# Patient Record
Sex: FEMALE | Race: BLACK OR AFRICAN AMERICAN | Employment: UNEMPLOYED | URBAN - METROPOLITAN AREA
[De-identification: names, ages, dates, MRNs, and addresses within clinical notes are randomized per-mention and may not be internally consistent; named-entity substitution may affect disease eponyms.]

---

## 2017-02-16 ENCOUNTER — APPOINTMENT (OUTPATIENT)
Dept: GENERAL RADIOLOGY | Age: 42
End: 2017-02-16
Payer: COMMERCIAL

## 2017-02-16 ENCOUNTER — HOSPITAL ENCOUNTER (EMERGENCY)
Age: 42
Discharge: HOME | End: 2017-02-16
Attending: EMERGENCY MEDICINE
Payer: COMMERCIAL

## 2017-02-16 VITALS
HEIGHT: 60 IN | DIASTOLIC BLOOD PRESSURE: 78 MMHG | RESPIRATION RATE: 17 BRPM | WEIGHT: 270 LBS | BODY MASS INDEX: 53.01 KG/M2 | SYSTOLIC BLOOD PRESSURE: 151 MMHG | OXYGEN SATURATION: 97 % | HEART RATE: 86 BPM | TEMPERATURE: 98.2 F

## 2017-02-16 DIAGNOSIS — Z72.0 TOBACCO ABUSE: ICD-10-CM

## 2017-02-16 DIAGNOSIS — J40 BRONCHITIS: Primary | ICD-10-CM

## 2017-02-16 DIAGNOSIS — J06.9 VIRAL UPPER RESPIRATORY ILLNESS: ICD-10-CM

## 2017-02-16 PROCEDURE — 99284 EMERGENCY DEPT VISIT MOD MDM: CPT

## 2017-02-16 PROCEDURE — 71020 XR CHEST STANDARD TWO VW: CPT | Performed by: RADIOLOGY

## 2017-02-16 PROCEDURE — 71020 XR CHEST STANDARD TWO VW: CPT

## 2017-02-16 PROCEDURE — 6370000000 HC RX 637 (ALT 250 FOR IP): Performed by: EMERGENCY MEDICINE

## 2017-02-16 PROCEDURE — 94640 AIRWAY INHALATION TREATMENT: CPT

## 2017-02-16 RX ORDER — IPRATROPIUM BROMIDE AND ALBUTEROL SULFATE 2.5; .5 MG/3ML; MG/3ML
1 SOLUTION RESPIRATORY (INHALATION) ONCE
Status: COMPLETED | OUTPATIENT
Start: 2017-02-16 | End: 2017-02-16

## 2017-02-16 RX ORDER — PREDNISONE 10 MG/1
TABLET ORAL
Qty: 20 TABLET | Refills: 0 | Status: SHIPPED | OUTPATIENT
Start: 2017-02-16 | End: 2017-02-26

## 2017-02-16 RX ORDER — CODEINE PHOSPHATE AND GUAIFENESIN 10; 100 MG/5ML; MG/5ML
10 SOLUTION ORAL ONCE
Status: COMPLETED | OUTPATIENT
Start: 2017-02-16 | End: 2017-02-16

## 2017-02-16 RX ORDER — ALBUTEROL SULFATE 90 UG/1
1-2 AEROSOL, METERED RESPIRATORY (INHALATION) EVERY 4 HOURS PRN
Qty: 1 INHALER | Refills: 0 | Status: SHIPPED | OUTPATIENT
Start: 2017-02-16 | End: 2019-08-09

## 2017-02-16 RX ORDER — GUAIFENESIN AND CODEINE PHOSPHATE 100; 10 MG/5ML; MG/5ML
5 SOLUTION ORAL 3 TIMES DAILY PRN
Qty: 180 ML | Refills: 0 | Status: SHIPPED | OUTPATIENT
Start: 2017-02-16 | End: 2017-02-23

## 2017-02-16 RX ADMIN — GUAIFENESIN AND CODEINE PHOSPHATE 10 ML: 100; 10 SOLUTION ORAL at 22:22

## 2017-02-16 RX ADMIN — IPRATROPIUM BROMIDE AND ALBUTEROL SULFATE 1 AMPULE: .5; 3 SOLUTION RESPIRATORY (INHALATION) at 21:00

## 2017-02-16 ASSESSMENT — ENCOUNTER SYMPTOMS
COUGH: 1
SHORTNESS OF BREATH: 1
ABDOMINAL PAIN: 0

## 2017-02-16 ASSESSMENT — PAIN DESCRIPTION - LOCATION: LOCATION: CHEST

## 2017-02-16 ASSESSMENT — PAIN SCALES - GENERAL: PAINLEVEL_OUTOF10: 4

## 2017-02-16 ASSESSMENT — PAIN - FUNCTIONAL ASSESSMENT: PAIN_FUNCTIONAL_ASSESSMENT: 0-10

## 2017-04-07 ENCOUNTER — HOSPITAL ENCOUNTER (OUTPATIENT)
Age: 42
Setting detail: SPECIMEN
Discharge: HOME | End: 2017-04-07
Payer: COMMERCIAL

## 2017-04-07 ENCOUNTER — OFFICE VISIT (OUTPATIENT)
Dept: OBGYN | Age: 42
End: 2017-04-07
Payer: COMMERCIAL

## 2017-04-07 VITALS
SYSTOLIC BLOOD PRESSURE: 117 MMHG | BODY MASS INDEX: 54.21 KG/M2 | HEART RATE: 73 BPM | WEIGHT: 276.1 LBS | DIASTOLIC BLOOD PRESSURE: 77 MMHG | HEIGHT: 60 IN

## 2017-04-07 DIAGNOSIS — Z01.419 WOMEN'S ANNUAL ROUTINE GYNECOLOGICAL EXAMINATION: Primary | ICD-10-CM

## 2017-04-07 LAB
DIRECT EXAM: ABNORMAL
Lab: ABNORMAL
SPECIMEN DESCRIPTION: ABNORMAL
STATUS: ABNORMAL

## 2017-04-07 PROCEDURE — 99385 PREV VISIT NEW AGE 18-39: CPT | Performed by: OBSTETRICS & GYNECOLOGY

## 2017-04-07 ASSESSMENT — PATIENT HEALTH QUESTIONNAIRE - PHQ9
1. LITTLE INTEREST OR PLEASURE IN DOING THINGS: 0
SUM OF ALL RESPONSES TO PHQ QUESTIONS 1-9: 1
SUM OF ALL RESPONSES TO PHQ9 QUESTIONS 1 & 2: 1
2. FEELING DOWN, DEPRESSED OR HOPELESS: 1
SUM OF ALL RESPONSES TO PHQ QUESTIONS 1-9: 1
SUM OF ALL RESPONSES TO PHQ9 QUESTIONS 1 & 2: 1
1. LITTLE INTEREST OR PLEASURE IN DOING THINGS: 0
2. FEELING DOWN, DEPRESSED OR HOPELESS: 1

## 2017-04-10 LAB
C TRACH DNA GENITAL QL NAA+PROBE: NEGATIVE
HPV SAMPLE: NORMAL
HPV SOURCE: NORMAL
HPV, GENOTYPE 16: NOT DETECTED
HPV, GENOTYPE 18: NOT DETECTED
HPV, HIGH RISK OTHER: NOT DETECTED
HPV, INTERPRETATION: NORMAL
N. GONORRHOEAE DNA: NEGATIVE

## 2017-04-11 LAB — CYTOLOGY REPORT: NORMAL

## 2017-06-02 ENCOUNTER — TELEPHONE (OUTPATIENT)
Dept: OBGYN | Age: 42
End: 2017-06-02

## 2017-06-02 RX ORDER — METRONIDAZOLE 500 MG/1
2000 TABLET ORAL ONCE
Qty: 4 TABLET | Refills: 0 | Status: SHIPPED | OUTPATIENT
Start: 2017-06-02 | End: 2017-06-19 | Stop reason: SDUPTHER

## 2017-06-03 ENCOUNTER — HOSPITAL ENCOUNTER (EMERGENCY)
Age: 42
Discharge: HOME | End: 2017-06-03
Attending: EMERGENCY MEDICINE
Payer: COMMERCIAL

## 2017-06-03 VITALS
DIASTOLIC BLOOD PRESSURE: 82 MMHG | RESPIRATION RATE: 20 BRPM | BODY MASS INDEX: 53.01 KG/M2 | OXYGEN SATURATION: 100 % | WEIGHT: 270 LBS | HEART RATE: 94 BPM | SYSTOLIC BLOOD PRESSURE: 137 MMHG | TEMPERATURE: 98.8 F | HEIGHT: 60 IN

## 2017-06-03 DIAGNOSIS — K61.0 PERIANAL ABSCESS: Primary | ICD-10-CM

## 2017-06-03 PROCEDURE — 99283 EMERGENCY DEPT VISIT LOW MDM: CPT

## 2017-06-03 PROCEDURE — 6370000000 HC RX 637 (ALT 250 FOR IP): Performed by: EMERGENCY MEDICINE

## 2017-06-03 RX ORDER — AMOXICILLIN AND CLAVULANATE POTASSIUM 875; 125 MG/1; MG/1
1 TABLET, FILM COATED ORAL ONCE
Status: COMPLETED | OUTPATIENT
Start: 2017-06-03 | End: 2017-06-03

## 2017-06-03 RX ORDER — HYDROCODONE BITARTRATE AND ACETAMINOPHEN 5; 325 MG/1; MG/1
1 TABLET ORAL EVERY 6 HOURS PRN
Qty: 20 TABLET | Refills: 0 | Status: SHIPPED | OUTPATIENT
Start: 2017-06-03 | End: 2017-06-10

## 2017-06-03 RX ORDER — METRONIDAZOLE 500 MG/1
500 TABLET ORAL 2 TIMES DAILY
Qty: 13 TABLET | Refills: 0 | Status: SHIPPED | OUTPATIENT
Start: 2017-06-03 | End: 2019-08-09

## 2017-06-03 RX ORDER — NAPROXEN 250 MG/1
500 TABLET ORAL ONCE
Status: COMPLETED | OUTPATIENT
Start: 2017-06-03 | End: 2017-06-03

## 2017-06-03 RX ORDER — METRONIDAZOLE 500 MG/1
500 TABLET ORAL ONCE
Status: COMPLETED | OUTPATIENT
Start: 2017-06-03 | End: 2017-06-03

## 2017-06-03 RX ORDER — AMOXICILLIN AND CLAVULANATE POTASSIUM 875; 125 MG/1; MG/1
1 TABLET, FILM COATED ORAL 2 TIMES DAILY
Qty: 13 TABLET | Refills: 0 | Status: SHIPPED | OUTPATIENT
Start: 2017-06-03 | End: 2019-08-09

## 2017-06-03 RX ADMIN — AMOXICILLIN AND CLAVULANATE POTASSIUM 1 TABLET: 875; 125 TABLET, FILM COATED ORAL at 03:14

## 2017-06-03 RX ADMIN — NAPROXEN 500 MG: 250 TABLET ORAL at 03:14

## 2017-06-03 RX ADMIN — METRONIDAZOLE 500 MG: 500 TABLET ORAL at 03:14

## 2017-06-03 ASSESSMENT — PAIN DESCRIPTION - LOCATION: LOCATION: RECTUM

## 2017-06-03 ASSESSMENT — ENCOUNTER SYMPTOMS
COLOR CHANGE: 1
VOMITING: 0
DIARRHEA: 0

## 2017-06-03 ASSESSMENT — PAIN SCALES - GENERAL
PAINLEVEL_OUTOF10: 9
PAINLEVEL_OUTOF10: 9

## 2017-06-03 ASSESSMENT — PAIN DESCRIPTION - PAIN TYPE: TYPE: ACUTE PAIN

## 2017-06-03 ASSESSMENT — PAIN DESCRIPTION - DESCRIPTORS: DESCRIPTORS: BURNING;TIGHTNESS;TENDER

## 2017-06-03 ASSESSMENT — PAIN DESCRIPTION - FREQUENCY: FREQUENCY: CONTINUOUS

## 2017-06-07 ENCOUNTER — HOSPITAL ENCOUNTER (OUTPATIENT)
Age: 42
Discharge: HOME | End: 2017-06-07
Payer: COMMERCIAL

## 2017-06-07 LAB
ABSOLUTE RETIC #: 0.1 M/UL (ref 0.02–0.1)
ANION GAP SERPL CALCULATED.3IONS-SCNC: 15 MMOL/L (ref 9–17)
BUN BLDV-MCNC: 9 MG/DL (ref 6–20)
BUN/CREAT BLD: ABNORMAL (ref 9–20)
CALCIUM SERPL-MCNC: 8.9 MG/DL (ref 8.6–10.4)
CHLORIDE BLD-SCNC: 104 MMOL/L (ref 98–107)
CHOLESTEROL/HDL RATIO: 2.9
CHOLESTEROL: 141 MG/DL
CO2: 23 MMOL/L (ref 20–31)
CREAT SERPL-MCNC: 0.48 MG/DL (ref 0.5–0.9)
CREATININE URINE: 137.2 MG/DL (ref 28–217)
ESTIMATED AVERAGE GLUCOSE: 131 MG/DL
GFR AFRICAN AMERICAN: >60 ML/MIN
GFR NON-AFRICAN AMERICAN: >60 ML/MIN
GFR SERPL CREATININE-BSD FRML MDRD: ABNORMAL ML/MIN/{1.73_M2}
GFR SERPL CREATININE-BSD FRML MDRD: ABNORMAL ML/MIN/{1.73_M2}
GLUCOSE BLD-MCNC: 100 MG/DL (ref 70–99)
HBA1C MFR BLD: 6.2 % (ref 4–6)
HCT VFR BLD CALC: 29.8 % (ref 36–46)
HDLC SERPL-MCNC: 49 MG/DL
HEMOGLOBIN: 9.3 G/DL (ref 12–16)
LDL CHOLESTEROL: 78 MG/DL (ref 0–130)
MCH RBC QN AUTO: 22.3 PG (ref 26–34)
MCHC RBC AUTO-ENTMCNC: 31.3 G/DL (ref 31–37)
MCV RBC AUTO: 71.3 FL (ref 80–100)
MICROALBUMIN/CREAT 24H UR: <12 MG/L
MICROALBUMIN/CREAT UR-RTO: 9 MCG/MG CREAT
PDW BLD-RTO: 20.5 % (ref 12.5–15.4)
PLATELET # BLD: 356 K/UL (ref 140–450)
PMV BLD AUTO: 9.3 FL (ref 6–12)
POTASSIUM SERPL-SCNC: 4.3 MMOL/L (ref 3.7–5.3)
RBC # BLD: 4.18 M/UL (ref 4–5.2)
RETIC %: 2.4 % (ref 0.5–2)
SODIUM BLD-SCNC: 142 MMOL/L (ref 135–144)
TRIGL SERPL-MCNC: 71 MG/DL
TSH SERPL DL<=0.05 MIU/L-ACNC: 1.98 MIU/L (ref 0.3–5)
VITAMIN D 25-HYDROXY: 10.2 NG/ML (ref 30–100)
VLDLC SERPL CALC-MCNC: NORMAL MG/DL (ref 1–30)
WBC # BLD: 7.6 K/UL (ref 3.5–11)

## 2017-06-07 PROCEDURE — 80061 LIPID PANEL: CPT

## 2017-06-07 PROCEDURE — 84443 ASSAY THYROID STIM HORMONE: CPT

## 2017-06-07 PROCEDURE — 85045 AUTOMATED RETICULOCYTE COUNT: CPT

## 2017-06-07 PROCEDURE — 85027 COMPLETE CBC AUTOMATED: CPT

## 2017-06-07 PROCEDURE — 82043 UR ALBUMIN QUANTITATIVE: CPT

## 2017-06-07 PROCEDURE — 80048 BASIC METABOLIC PNL TOTAL CA: CPT

## 2017-06-07 PROCEDURE — 86665 EPSTEIN-BARR CAPSID VCA: CPT

## 2017-06-07 PROCEDURE — 86664 EPSTEIN-BARR NUCLEAR ANTIGEN: CPT

## 2017-06-07 PROCEDURE — 86663 EPSTEIN-BARR ANTIBODY: CPT

## 2017-06-07 PROCEDURE — 83036 HEMOGLOBIN GLYCOSYLATED A1C: CPT

## 2017-06-07 PROCEDURE — 82570 ASSAY OF URINE CREATININE: CPT

## 2017-06-07 PROCEDURE — 82306 VITAMIN D 25 HYDROXY: CPT

## 2017-06-07 PROCEDURE — 36415 COLL VENOUS BLD VENIPUNCTURE: CPT

## 2017-06-08 LAB
EBV EARLY ANTIGEN AB, IGG: <5 U/ML (ref 0–10.9)
EBV NUCLEAR AG AB: >600 U/ML (ref 0–21.9)
EPSTEIN-BARR VCA IGG: 125 U/ML (ref 0–21.9)
EPSTEIN-BARR VCA IGM: <10 U/ML (ref 0–43.9)

## 2017-06-19 RX ORDER — METRONIDAZOLE 500 MG/1
2000 TABLET ORAL ONCE
Qty: 4 TABLET | Refills: 0 | Status: SHIPPED | OUTPATIENT
Start: 2017-06-19 | End: 2017-06-19

## 2017-09-06 ENCOUNTER — OFFICE VISIT (OUTPATIENT)
Dept: OBGYN | Age: 42
End: 2017-09-06
Payer: COMMERCIAL

## 2017-09-06 ENCOUNTER — HOSPITAL ENCOUNTER (OUTPATIENT)
Age: 42
Setting detail: SPECIMEN
Discharge: HOME | End: 2017-09-06
Payer: COMMERCIAL

## 2017-09-06 VITALS
BODY MASS INDEX: 53.99 KG/M2 | HEIGHT: 60 IN | DIASTOLIC BLOOD PRESSURE: 85 MMHG | WEIGHT: 275 LBS | SYSTOLIC BLOOD PRESSURE: 129 MMHG | HEART RATE: 90 BPM

## 2017-09-06 DIAGNOSIS — N93.9 ABNORMAL UTERINE BLEEDING (AUB): Primary | ICD-10-CM

## 2017-09-06 DIAGNOSIS — A59.9 TRICHOMONAS INFECTION: ICD-10-CM

## 2017-09-06 LAB
DIRECT EXAM: ABNORMAL
Lab: ABNORMAL
SPECIMEN DESCRIPTION: ABNORMAL
STATUS: ABNORMAL

## 2017-09-06 PROCEDURE — 99213 OFFICE O/P EST LOW 20 MIN: CPT | Performed by: OBSTETRICS & GYNECOLOGY

## 2017-10-07 ENCOUNTER — HOSPITAL ENCOUNTER (EMERGENCY)
Age: 42
Discharge: HOME | End: 2017-10-07
Attending: EMERGENCY MEDICINE
Payer: COMMERCIAL

## 2017-10-07 VITALS
BODY MASS INDEX: 51.44 KG/M2 | DIASTOLIC BLOOD PRESSURE: 86 MMHG | WEIGHT: 262 LBS | HEIGHT: 60 IN | RESPIRATION RATE: 16 BRPM | HEART RATE: 93 BPM | SYSTOLIC BLOOD PRESSURE: 148 MMHG | OXYGEN SATURATION: 100 % | TEMPERATURE: 98.4 F

## 2017-10-07 DIAGNOSIS — H92.03 EAR PAIN, BILATERAL: ICD-10-CM

## 2017-10-07 DIAGNOSIS — S46.811A TRAPEZIUS STRAIN, RIGHT, INITIAL ENCOUNTER: ICD-10-CM

## 2017-10-07 DIAGNOSIS — J02.9 ACUTE PHARYNGITIS, UNSPECIFIED ETIOLOGY: Primary | ICD-10-CM

## 2017-10-07 PROCEDURE — 99283 EMERGENCY DEPT VISIT LOW MDM: CPT

## 2017-10-07 RX ORDER — AZITHROMYCIN 250 MG/1
TABLET, FILM COATED ORAL
Qty: 1 PACKET | Refills: 0 | Status: SHIPPED | OUTPATIENT
Start: 2017-10-07 | End: 2017-10-17

## 2017-10-07 RX ORDER — TRAMADOL HYDROCHLORIDE 50 MG/1
50 TABLET ORAL EVERY 6 HOURS PRN
Qty: 10 TABLET | Refills: 0 | Status: SHIPPED | OUTPATIENT
Start: 2017-10-07 | End: 2018-07-11

## 2017-10-07 ASSESSMENT — PAIN DESCRIPTION - LOCATION: LOCATION: EAR;THROAT

## 2017-10-07 ASSESSMENT — PAIN DESCRIPTION - DESCRIPTORS: DESCRIPTORS: ACHING

## 2017-10-07 ASSESSMENT — PAIN DESCRIPTION - PAIN TYPE: TYPE: ACUTE PAIN

## 2017-10-07 ASSESSMENT — PAIN SCALES - GENERAL: PAINLEVEL_OUTOF10: 10

## 2017-10-07 ASSESSMENT — PAIN DESCRIPTION - ONSET: ONSET: ON-GOING

## 2017-10-07 ASSESSMENT — PAIN DESCRIPTION - FREQUENCY: FREQUENCY: CONTINUOUS

## 2017-10-07 NOTE — ED PROVIDER NOTES
[address]  eMERGENCY dEPARTMENT eNCOUnter      Pt Name: Andrew Ovalle  MRN: [id number]  [place] 1975  Date of evaluation: 10/7/2017  Provider: Jayla Cruz PA-C    CHIEF COMPLAINT     No chief complaint on file. HISTORY OF PRESENT ILLNESS  (Location/Symptom, Timing/Onset, Context/Setting, Quality, Duration, Modifying Factors, Severity.)   Andrew Ovalle is a 46 y.o. female who presents to the emergency department With complaints of bilateral ear pain, sore throat, right shoulder pain. Patient states her ear pain and sore throat began yesterday. Patient states it is painful to eat and drink. States she does have postnasal drip. Patient denies fever, chills, and congestion, rhinorrhea cough, chest pain, shortness breath, nausea, vomiting, abdominal pain. Patient has not taken anything for her symptoms. She is also complaining of right shoulder pain that started 3 weeks ago. Patient did see her family doctor who prescribed her a muscle relaxer which is not alleviating any pain. Patient denies any injury but states she sleeps on her right arm. Admits to numbness and tingling that goes down the arm. Patient denies any neck pain, weakness. No other complaints. Nursing Notes were reviewed. REVIEW OF SYSTEMS    (2-9 systems for level 4, 10 or more for level 5)     Review of Systems   Ear pain, shoulder pain, sore throat, post nasal drip     Except as noted above the remainder of the review of systems was reviewed and negative.        PAST MEDICAL HISTORY     Past Medical History:   Diagnosis Date    Diabetes mellitus ([address]     GERD (gastroesophageal reflux disease)     Hidradenitis suppurativa     Hyperlipidemia     Hypertension     Obesity, morbid, BMI 50 or higher ([address]     Sleep apnea     CPAP use at 16     None otherwise stated in nurses notes    SURGICAL HISTORY       Past Surgical History:   Procedure Laterality Date    ABSCESS DRAINAGE      ABSCESS years ago. She has been smoking about 0.50 packs per day. She does not have any smokeless tobacco history on file. She reports that she drinks alcohol. She reports that she uses drugs, including Marijuana. lives at home with others     PHYSICAL EXAM    (up to 7 for level 4, 8 or more for level 5)     ED Triage Vitals [10/07/17 1853]   BP Temp Temp src Pulse Resp SpO2 Height Weight   (!) 148/86 98.4 °F (36.9 °C) -- 93 16 100 % 5' (1.524 m) 262 lb (118.8 kg)       Physical Exam   Nursing note and vitals reviewed. Constitutional: Oriented to person, place, and time and well-developed, well-nourished. Head: Normocephalic and atraumatic. Ear: External ears normal. Tympanic membranes nonerythematous bilaterally with no bulging. No canal erythema. No mastoid tenderness. Nose: Nose normal and midline. Eyes: Conjunctivae and EOM are normal. Pupils are equal, round, and reactive to light. Neck: Normal range of motion. Neck supple. Throat: Posterior pharynx is erythematous with swelling. No exudates. Airway is patent. No tonsillar abscess. Cardiovascular: Normal rate, regular rhythm, normal heart sounds and intact distal pulses. Pulmonary/Chest: Effort normal and breath sounds normal. No respiratory distress. No wheezes. No rales. No chest tenderness. Abdominal: Soft. Bowel sounds are normal. No distension and no mass. There is no tenderness. There is no rebound and no guarding. Musculoskeletal: Examination of right shoulder reveals no visible deformities, bruising, abrasions, swelling, erythema. No bony tenderness. Tenderness over the right trapezius muscle. No midline cervical tenderness. Full range of motion. 5/5 strength. 2/2 radial pulse. Good  strength. Brisk capillary refill. Distal sensation intact. Neurological: Alert and oriented to person, place, and time. GCS score is 15. Skin: Skin is warm and dry. No rash noted. No erythema. No pallor.    Psychiatric: Mood, memory, followed by 250mg on days 2-5. TRAMADOL (ULTRAM) 50 MG TABLET    Take 1 tablet by mouth every 6 hours as needed for Pain         Summation      Patient Course:  Bilateral ear pain, sore throat, right shoulder pain. Examination reveals an erythematous swollen throat that is patent. Right trapezius muscle pain. We'll give tramadol for patient's pain. Instructed her to take NSAIDs as well. Z-Shakir for patient's sore throat. Patient to follow-up with primary care physician. Return if symptoms change or worsen. Patient understands and agrees with plan. ED Medications administered this visit:  Medications - No data to display    New Prescriptions from this visit:    New Prescriptions    AZITHROMYCIN (ZITHROMAX) 250 MG TABLET    Take 500mg on day 1, followed by 250mg on days 2-5. TRAMADOL (ULTRAM) 50 MG TABLET    Take 1 tablet by mouth every 6 hours as needed for Pain       Follow-up:  Delia Eric, [address]  [phone]    Schedule an appointment as soon as possible for a visit in 1 day      [facility] ED  [place] [id number] [phone]    If symptoms worsen        Final Impression:   1. Acute pharyngitis, unspecified etiology    2. Ear pain, bilateral    3.  Trapezius strain, right, initial encounter               (Please note that portions of this note were completed with a voice recognition program.  Efforts were made to edit the dictations but occasionally words are mis-transcribed.)      (Please note that portions of this note were completed with a voice recognition program.  Efforts were made to edit the dictations but occasionally words are mis-transcribed.)    Andrew Cruz. Marco Ruelas, PA-C  10/07/17 1934

## 2017-10-07 NOTE — ED PROVIDER NOTES
[address]  eMERGENCY dEPARTMENT eNCOUnter   Independent Attestation     Pt Name: Davey Arevalo  MRN: [id number]  [place] 1975  Date of evaluation: 10/7/17   Davey Arevalo is a 46 y.o. female who presents with No chief complaint on file. Vitals:   Vitals:    10/07/17 1853   BP: (!) 148/86   Pulse: 93   Resp: 16   Temp: 98.4 °F (36.9 °C)   SpO2: 100%   Weight: 262 lb (118.8 kg)   Height: 5' (1.524 m)     Impression:   1. Acute pharyngitis, unspecified etiology    2. Ear pain, bilateral    3. Trapezius strain, right, initial encounter      I was personally available for consultation in the Emergency Department. I have reviewed the chart and agree with the documentation as recorded by the [facility], including the assessment, treatment plan and disposition.   Titi Robertson MD  Attending Emergency  Physician                  Titi Robertson MD  10/07/17 1937       Titi Robertson MD  10/07/17 9078

## 2017-11-27 ENCOUNTER — APPOINTMENT (OUTPATIENT)
Dept: CT IMAGING | Age: 42
End: 2017-11-27
Payer: COMMERCIAL

## 2017-11-27 ENCOUNTER — HOSPITAL ENCOUNTER (EMERGENCY)
Age: 42
Discharge: HOME | End: 2017-11-28
Attending: EMERGENCY MEDICINE
Payer: COMMERCIAL

## 2017-11-27 DIAGNOSIS — R10.13 ABDOMINAL PAIN, EPIGASTRIC: ICD-10-CM

## 2017-11-27 DIAGNOSIS — R11.2 NAUSEA VOMITING AND DIARRHEA: Primary | ICD-10-CM

## 2017-11-27 DIAGNOSIS — R19.7 NAUSEA VOMITING AND DIARRHEA: Primary | ICD-10-CM

## 2017-11-27 LAB
ALBUMIN SERPL-MCNC: 4.6 G/DL (ref 3.5–5.2)
ALBUMIN/GLOBULIN RATIO: NORMAL (ref 1–2.5)
ALP BLD-CCNC: 59 U/L (ref 35–104)
ALT SERPL-CCNC: 8 U/L (ref 5–33)
ANION GAP SERPL CALCULATED.3IONS-SCNC: 15 MMOL/L (ref 9–17)
AST SERPL-CCNC: 11 U/L
BILIRUB SERPL-MCNC: 0.36 MG/DL (ref 0.3–1.2)
BILIRUBIN DIRECT: 0.1 MG/DL
BILIRUBIN, INDIRECT: 0.26 MG/DL (ref 0–1)
BUN BLDV-MCNC: 14 MG/DL (ref 6–20)
BUN/CREAT BLD: ABNORMAL (ref 9–20)
CALCIUM SERPL-MCNC: 9 MG/DL (ref 8.6–10.4)
CHLORIDE BLD-SCNC: 99 MMOL/L (ref 98–107)
CO2: 23 MMOL/L (ref 20–31)
CREAT SERPL-MCNC: 0.45 MG/DL (ref 0.5–0.9)
GFR AFRICAN AMERICAN: >60 ML/MIN
GFR NON-AFRICAN AMERICAN: >60 ML/MIN
GFR SERPL CREATININE-BSD FRML MDRD: ABNORMAL ML/MIN/{1.73_M2}
GFR SERPL CREATININE-BSD FRML MDRD: ABNORMAL ML/MIN/{1.73_M2}
GLOBULIN: NORMAL G/DL (ref 1.5–3.8)
GLUCOSE BLD-MCNC: 121 MG/DL (ref 70–99)
HCG QUALITATIVE: NEGATIVE
LIPASE: 23 U/L (ref 13–60)
POTASSIUM SERPL-SCNC: 4 MMOL/L (ref 3.7–5.3)
SODIUM BLD-SCNC: 137 MMOL/L (ref 135–144)
TOTAL PROTEIN: 8.3 G/DL (ref 6.4–8.3)

## 2017-11-27 PROCEDURE — 2500000003 HC RX 250 WO HCPCS: Performed by: EMERGENCY MEDICINE

## 2017-11-27 PROCEDURE — 80048 BASIC METABOLIC PNL TOTAL CA: CPT

## 2017-11-27 PROCEDURE — 96375 TX/PRO/DX INJ NEW DRUG ADDON: CPT

## 2017-11-27 PROCEDURE — 80076 HEPATIC FUNCTION PANEL: CPT

## 2017-11-27 PROCEDURE — 99284 EMERGENCY DEPT VISIT MOD MDM: CPT

## 2017-11-27 PROCEDURE — 6360000002 HC RX W HCPCS: Performed by: EMERGENCY MEDICINE

## 2017-11-27 PROCEDURE — 83690 ASSAY OF LIPASE: CPT

## 2017-11-27 PROCEDURE — 84703 CHORIONIC GONADOTROPIN ASSAY: CPT

## 2017-11-27 PROCEDURE — 36415 COLL VENOUS BLD VENIPUNCTURE: CPT

## 2017-11-27 PROCEDURE — 2580000003 HC RX 258: Performed by: EMERGENCY MEDICINE

## 2017-11-27 PROCEDURE — 85025 COMPLETE CBC W/AUTO DIFF WBC: CPT

## 2017-11-27 PROCEDURE — S0028 INJECTION, FAMOTIDINE, 20 MG: HCPCS | Performed by: EMERGENCY MEDICINE

## 2017-11-27 PROCEDURE — 96374 THER/PROPH/DIAG INJ IV PUSH: CPT

## 2017-11-27 RX ORDER — ONDANSETRON 2 MG/ML
4 INJECTION INTRAMUSCULAR; INTRAVENOUS ONCE
Status: COMPLETED | OUTPATIENT
Start: 2017-11-27 | End: 2017-11-27

## 2017-11-27 RX ORDER — SODIUM CHLORIDE 0.9 % (FLUSH) 0.9 %
10 SYRINGE (ML) INJECTION PRN
Status: DISCONTINUED | OUTPATIENT
Start: 2017-11-27 | End: 2017-11-28 | Stop reason: HOSPADM

## 2017-11-27 RX ORDER — 0.9 % SODIUM CHLORIDE 0.9 %
100 INTRAVENOUS SOLUTION INTRAVENOUS ONCE
Status: COMPLETED | OUTPATIENT
Start: 2017-11-28 | End: 2017-11-28

## 2017-11-27 RX ORDER — FENTANYL CITRATE 50 UG/ML
50 INJECTION, SOLUTION INTRAMUSCULAR; INTRAVENOUS ONCE
Status: COMPLETED | OUTPATIENT
Start: 2017-11-27 | End: 2017-11-27

## 2017-11-27 RX ORDER — 0.9 % SODIUM CHLORIDE 0.9 %
1000 INTRAVENOUS SOLUTION INTRAVENOUS ONCE
Status: COMPLETED | OUTPATIENT
Start: 2017-11-27 | End: 2017-11-28

## 2017-11-27 RX ADMIN — ONDANSETRON 4 MG: 2 INJECTION INTRAMUSCULAR; INTRAVENOUS at 23:16

## 2017-11-27 RX ADMIN — FENTANYL CITRATE 50 MCG: 50 INJECTION INTRAMUSCULAR; INTRAVENOUS at 23:53

## 2017-11-27 RX ADMIN — SODIUM CHLORIDE 1000 ML: 9 INJECTION, SOLUTION INTRAVENOUS at 23:15

## 2017-11-27 RX ADMIN — FAMOTIDINE 20 MG: 10 INJECTION, SOLUTION INTRAVENOUS at 23:16

## 2017-11-27 ASSESSMENT — ENCOUNTER SYMPTOMS
VOMITING: 1
SHORTNESS OF BREATH: 0
NAUSEA: 1
COUGH: 0
DIARRHEA: 1
SORE THROAT: 0
ABDOMINAL PAIN: 1

## 2017-11-27 ASSESSMENT — PAIN DESCRIPTION - ORIENTATION: ORIENTATION: MID;LEFT;RIGHT

## 2017-11-27 ASSESSMENT — PAIN DESCRIPTION - ONSET: ONSET: ON-GOING

## 2017-11-27 ASSESSMENT — PAIN DESCRIPTION - FREQUENCY: FREQUENCY: CONTINUOUS

## 2017-11-27 ASSESSMENT — PAIN DESCRIPTION - DESCRIPTORS: DESCRIPTORS: CONSTANT;SHARP;STABBING

## 2017-11-27 ASSESSMENT — PAIN DESCRIPTION - LOCATION: LOCATION: ABDOMEN

## 2017-11-27 ASSESSMENT — PAIN SCALES - GENERAL
PAINLEVEL_OUTOF10: 10
PAINLEVEL_OUTOF10: 10

## 2017-11-27 ASSESSMENT — PAIN DESCRIPTION - PAIN TYPE: TYPE: ACUTE PAIN

## 2017-11-28 ENCOUNTER — APPOINTMENT (OUTPATIENT)
Dept: CT IMAGING | Age: 42
End: 2017-11-28
Payer: COMMERCIAL

## 2017-11-28 VITALS
BODY MASS INDEX: 51.44 KG/M2 | HEIGHT: 60 IN | TEMPERATURE: 98.7 F | DIASTOLIC BLOOD PRESSURE: 84 MMHG | OXYGEN SATURATION: 98 % | RESPIRATION RATE: 18 BRPM | HEART RATE: 100 BPM | SYSTOLIC BLOOD PRESSURE: 137 MMHG | WEIGHT: 262 LBS

## 2017-11-28 LAB
ABSOLUTE BANDS #: 0.09 K/UL (ref 0–1)
ABSOLUTE EOS #: 0.09 K/UL (ref 0–0.4)
ABSOLUTE IMMATURE GRANULOCYTE: ABNORMAL K/UL (ref 0–0.3)
ABSOLUTE LYMPH #: 0.62 K/UL (ref 1–4.8)
ABSOLUTE MONO #: 0.26 K/UL (ref 0.1–1.3)
BANDS: 1 % (ref 0–10)
BASOPHILS # BLD: 0 % (ref 0–2)
BASOPHILS ABSOLUTE: 0 K/UL (ref 0–0.2)
DIFFERENTIAL TYPE: ABNORMAL
EOSINOPHILS RELATIVE PERCENT: 1 % (ref 0–4)
HCT VFR BLD CALC: 32.5 % (ref 36–46)
HEMOGLOBIN: 10.5 G/DL (ref 12–16)
IMMATURE GRANULOCYTES: ABNORMAL %
LYMPHOCYTES # BLD: 7 % (ref 24–44)
MCH RBC QN AUTO: 24 PG (ref 26–34)
MCHC RBC AUTO-ENTMCNC: 32.2 G/DL (ref 31–37)
MCV RBC AUTO: 74.6 FL (ref 80–100)
MONOCYTES # BLD: 3 % (ref 1–7)
MORPHOLOGY: ABNORMAL
PDW BLD-RTO: 20 % (ref 11.5–14.9)
PLATELET # BLD: 274 K/UL (ref 150–450)
PLATELET ESTIMATE: ABNORMAL
PMV BLD AUTO: 9.5 FL (ref 6–12)
RBC # BLD: 4.36 M/UL (ref 4–5.2)
RBC # BLD: ABNORMAL 10*6/UL
SEG NEUTROPHILS: 88 % (ref 36–66)
SEGMENTED NEUTROPHILS ABSOLUTE COUNT: 7.74 K/UL (ref 1.3–9.1)
WBC # BLD: 8.8 K/UL (ref 3.5–11)
WBC # BLD: ABNORMAL 10*3/UL

## 2017-11-28 PROCEDURE — 6360000004 HC RX CONTRAST MEDICATION: Performed by: EMERGENCY MEDICINE

## 2017-11-28 PROCEDURE — 74177 CT ABD & PELVIS W/CONTRAST: CPT

## 2017-11-28 PROCEDURE — 2580000003 HC RX 258: Performed by: EMERGENCY MEDICINE

## 2017-11-28 RX ORDER — FAMOTIDINE 20 MG/1
20 TABLET, FILM COATED ORAL 2 TIMES DAILY
Qty: 30 TABLET | Refills: 0 | Status: SHIPPED | OUTPATIENT
Start: 2017-11-28 | End: 2019-08-09

## 2017-11-28 RX ORDER — ONDANSETRON 4 MG/1
4 TABLET, ORALLY DISINTEGRATING ORAL EVERY 12 HOURS PRN
Qty: 6 TABLET | Refills: 0 | Status: SHIPPED | OUTPATIENT
Start: 2017-11-28 | End: 2019-08-09

## 2017-11-28 RX ADMIN — Medication 10 ML: at 00:24

## 2017-11-28 RX ADMIN — SODIUM CHLORIDE 100 ML: 9 INJECTION, SOLUTION INTRAVENOUS at 00:24

## 2017-11-28 RX ADMIN — IOPAMIDOL 100 ML: 755 INJECTION, SOLUTION INTRAVENOUS at 00:24

## 2017-11-28 ASSESSMENT — PAIN DESCRIPTION - ONSET: ONSET: ON-GOING

## 2017-11-28 ASSESSMENT — PAIN SCALES - GENERAL: PAINLEVEL_OUTOF10: 7

## 2017-11-28 ASSESSMENT — PAIN DESCRIPTION - ORIENTATION: ORIENTATION: MID;LEFT;RIGHT

## 2017-11-28 ASSESSMENT — PAIN DESCRIPTION - PAIN TYPE: TYPE: ACUTE PAIN

## 2017-11-28 ASSESSMENT — PAIN DESCRIPTION - PROGRESSION: CLINICAL_PROGRESSION: GRADUALLY IMPROVING

## 2017-11-28 ASSESSMENT — PAIN DESCRIPTION - FREQUENCY: FREQUENCY: CONTINUOUS

## 2017-11-28 ASSESSMENT — PAIN DESCRIPTION - DESCRIPTORS: DESCRIPTORS: CONSTANT;SHARP;STABBING

## 2017-11-28 ASSESSMENT — PAIN DESCRIPTION - LOCATION: LOCATION: ABDOMEN

## 2017-11-28 NOTE — ED NOTES
Pt arrive to ED c/o adb pain, mid epigastric that radiates to the right and left. Pt states this has been occurring for the last 2 hours. Pt reports pain 10/10, sharp/stabbing/constant. Pt also reports lower and upper back pain that she believes is from vomiting. Pt reports diarrhea and gas since today as well. Pt states she has not been able to eat/drink anything today and has been dry heaving which has caused her to have a HA. Pt denies problem with urination.   Pt alert/oriented x.4      Emelina Dawkins RN  11/27/17 9023

## 2017-11-28 NOTE — ED PROVIDER NOTES
[address]  eMERGENCY dEPARTMENT eNCOUnter   Attending Attestation     Pt Name: Hiram Galvez  MRN: [id number]  [place] 1975  Date of evaluation: 11/28/17       Hiram Galvez is a 38 y.o. female who presents with Abdominal Pain      MDM: Epigastric abdominal pain. No radiation of pain. Nausea but no vomiting. Patient is dry heaving in the emergency department. No changes in bowel habits. On exam patient is afebrile and nontoxic in appearance. She has significant epigastric and right upper quadrant tenderness. She does have a positive Levine sign on my exam.  No peritoneal signs. Plan is for blood work, IV fluids, antiemetics and CT scan of abdomen and pelvis. Vitals:   Vitals:    11/27/17 2229 11/27/17 2349   BP: (!) 103/59 119/75   Pulse: 104 96   Resp: 18 18   Temp: 98.3 °F (36.8 °C) 98 °F (36.7 °C)   TempSrc: Oral Oral   SpO2: 99% 99%   Weight: 262 lb (118.8 kg)    Height: 5' (1.524 m)          I personally evaluated and examined the patient in conjunction with the resident and agree with the assessment, treatment plan, and disposition of the patient as recorded by the resident. I performed a history and physical examination of the patient and discussed management with the resident. I reviewed the residents note and agree with the documented findings and plan of care. Any areas of disagreement are noted on the chart. I was personally present for the key portions of any procedures. I have documented in the chart those procedures where I was not present during the key portions. I have personally reviewed all images and agree with the resident's interpretation. I have reviewed the emergency nurses triage note. I agree with the chief complaint, past medical history, past surgical history, allergies, medications, social and family history as documented unless otherwise noted.     Jorge Escobar DO  Attending Emergency Physician            Jorge Escobar DO  11/28/17 0028

## 2017-11-28 NOTE — ED PROVIDER NOTES
REPORTED 0 %    Absolute Immature Granulocyte NOT REPORTED 0.00 - 0.30 k/uL    WBC Morphology NOT REPORTED     RBC Morphology NOT REPORTED     Platelet Estimate NOT REPORTED     Seg Neutrophils 88 (H) 36 - 66 %    Lymphocytes 7 (L) 24 - 44 %    Monocytes 3 1 - 7 %    Eosinophils % 1 0 - 4 %    Basophils 0 0 - 2 %    Bands 1 0 - 10 %    Segs Absolute 7.74 1.3 - 9.1 k/uL    Absolute Lymph # 0.62 (L) 1.0 - 4.8 k/uL    Absolute Mono # 0.26 0.1 - 1.3 k/uL    Absolute Eos # 0.09 0.0 - 0.4 k/uL    Basophils # 0.00 0.0 - 0.2 k/uL    Absolute Bands # 0.09 0.0 - 1.0 k/uL    Morphology HYPOCHROMIA PRESENT    Basic Metabolic Panel   Result Value Ref Range    Glucose 121 (H) 70 - 99 mg/dL    BUN 14 6 - 20 mg/dL    CREATININE 0.45 (L) 0.50 - 0.90 mg/dL    Bun/Cre Ratio NOT REPORTED 9 - 20    Calcium 9.0 8.6 - 10.4 mg/dL    Sodium 137 135 - 144 mmol/L    Potassium 4.0 3.7 - 5.3 mmol/L    Chloride 99 98 - 107 mmol/L    CO2 23 20 - 31 mmol/L    Anion Gap 15 9 - 17 mmol/L    GFR Non-African American >60 >60 mL/min    GFR African American >60 >60 mL/min    GFR Comment          GFR Staging NOT REPORTED    Hepatic Function Panel   Result Value Ref Range    Alb 4.6 3.5 - 5.2 g/dL    Alkaline Phosphatase 59 35 - 104 U/L    ALT 8 5 - 33 U/L    AST 11 <32 U/L    Total Bilirubin 0.36 0.3 - 1.2 mg/dL    Bilirubin, Direct 0.10 <0.31 mg/dL    Bilirubin, Indirect 0.26 0.00 - 1.00 mg/dL    Total Protein 8.3 6.4 - 8.3 g/dL    Globulin NOT REPORTED 1.5 - 3.8 g/dL    Albumin/Globulin Ratio NOT REPORTED 1.0 - 2.5   Lipase   Result Value Ref Range    Lipase 23 13 - 60 U/L   HCG Qualitative, Serum   Result Value Ref Range    hCG Qual NEGATIVE NEG       IMPRESSION: Patient presents with acute onset of epigastric abdominal pain with associated nausea, vomiting and diarrhea. Differentials include gastroenteritis, pancreatitis, hepatitis, cholecystitis. On exam, patient is a little tachycardic with a heart rate in the low 100s.   She otherwise afebrile and hemodynamically stable. She does appear to be in mild discomfort. No abnormal heart sounds heard, lungs are clear to auscultation. She does exhibit some tenderness to palpation in the epigastric region of her abdomen. Given her presentation, we'll plan to obtain abdominal labs, treated with appropriate medical therapy and obtain a CT scan of her abdomen. RADIOLOGY:  Ct Abdomen Pelvis W Iv Contrast    Result Date: 11/28/2017  1. No acute findings identified within the abdomen and pelvis. EKG  None    All EKG's are interpreted by the Emergency Department Physician who either signs or Co-signs this chart in the absence of a cardiologist.    EMERGENCY DEPARTMENT COURSE:  Patient was updated on lab results and imaging studies. When reassessed, she was sleeping comfortably in bed. Discussed that we discharge her with some Zofran and Pepcid. She is advised to follow-up with her primary care doctor. Discussed that if symptoms persist or do not improve, that she should return to the ED. Patient demonstrated her understanding is agreeable with plan. Patient is stable for discharge. PROCEDURES:  None    CONSULTS:  None    CRITICAL CARE:  None    FINAL IMPRESSION      1. Nausea vomiting and diarrhea    2.  Abdominal pain, epigastric          DISPOSITION / PLAN     DISPOSITION Decision to Discharge    PATIENT REFERRED TO:  Aleksander Myers MD  [address]  [phone]    Schedule an appointment as soon as possible for a visit   As needed, If symptoms worsen      DISCHARGE MEDICATIONS:  New Prescriptions    FAMOTIDINE (PEPCID) 20 MG TABLET    Take 1 tablet by mouth 2 times daily    ONDANSETRON (ZOFRAN ODT) 4 MG DISINTEGRATING TABLET    Take 1 tablet by mouth every 12 hours as needed for Nausea       Anna Prater MD  Emergency Medicine Resident    (Please note that portions of this note were completed with a voice recognition program.  Efforts were made to edit the dictations but occasionally words are mis-transcribed.)       Pastor Martha MD  Resident  11/28/17 0859

## 2018-05-11 ENCOUNTER — HOSPITAL ENCOUNTER (OUTPATIENT)
Age: 43
Discharge: HOME | End: 2018-05-11
Payer: COMMERCIAL

## 2018-05-11 LAB
ALBUMIN SERPL-MCNC: 3.7 G/DL (ref 3.5–5.2)
ALBUMIN/GLOBULIN RATIO: 1.1 (ref 1–2.5)
ALP BLD-CCNC: 49 U/L (ref 35–104)
ALT SERPL-CCNC: 10 U/L (ref 5–33)
ANION GAP SERPL CALCULATED.3IONS-SCNC: 13 MMOL/L (ref 9–17)
AST SERPL-CCNC: 11 U/L
BILIRUB SERPL-MCNC: 0.19 MG/DL (ref 0.3–1.2)
BILIRUBIN DIRECT: <0.08 MG/DL
BILIRUBIN, INDIRECT: ABNORMAL MG/DL (ref 0–1)
BUN BLDV-MCNC: 10 MG/DL (ref 6–20)
BUN/CREAT BLD: ABNORMAL (ref 9–20)
CALCIUM SERPL-MCNC: 8.4 MG/DL (ref 8.6–10.4)
CHLORIDE BLD-SCNC: 105 MMOL/L (ref 98–107)
CHOLESTEROL, FASTING: 152 MG/DL
CHOLESTEROL/HDL RATIO: 3.2
CO2: 24 MMOL/L (ref 20–31)
CREAT SERPL-MCNC: 0.45 MG/DL (ref 0.5–0.9)
CREATININE URINE: 77.2 MG/DL (ref 28–217)
ESTIMATED AVERAGE GLUCOSE: 120 MG/DL
GFR AFRICAN AMERICAN: >60 ML/MIN
GFR NON-AFRICAN AMERICAN: >60 ML/MIN
GFR SERPL CREATININE-BSD FRML MDRD: ABNORMAL ML/MIN/{1.73_M2}
GFR SERPL CREATININE-BSD FRML MDRD: ABNORMAL ML/MIN/{1.73_M2}
GLOBULIN: ABNORMAL G/DL (ref 1.5–3.8)
GLUCOSE BLD-MCNC: 95 MG/DL (ref 70–99)
HBA1C MFR BLD: 5.8 % (ref 4–6)
HCT VFR BLD CALC: 31.1 % (ref 36.3–47.1)
HDLC SERPL-MCNC: 48 MG/DL
HEMOGLOBIN: 8.9 G/DL (ref 11.9–15.1)
LDL CHOLESTEROL: 89 MG/DL (ref 0–130)
MCH RBC QN AUTO: 22.6 PG (ref 25.2–33.5)
MCHC RBC AUTO-ENTMCNC: 28.6 G/DL (ref 28.4–34.8)
MCV RBC AUTO: 79.1 FL (ref 82.6–102.9)
MICROALBUMIN/CREAT 24H UR: <12 MG/L
MICROALBUMIN/CREAT UR-RTO: NORMAL MCG/MG CREAT
NRBC AUTOMATED: 0 PER 100 WBC
PDW BLD-RTO: 19.9 % (ref 11.8–14.4)
PLATELET # BLD: 249 K/UL (ref 138–453)
PMV BLD AUTO: 11.6 FL (ref 8.1–13.5)
POTASSIUM SERPL-SCNC: 3.7 MMOL/L (ref 3.7–5.3)
RBC # BLD: 3.93 M/UL (ref 3.95–5.11)
SODIUM BLD-SCNC: 142 MMOL/L (ref 135–144)
TOTAL CK: 86 U/L (ref 26–192)
TOTAL PROTEIN: 7.2 G/DL (ref 6.4–8.3)
TRIGLYCERIDE, FASTING: 76 MG/DL
TSH SERPL DL<=0.05 MIU/L-ACNC: 2.24 MIU/L (ref 0.3–5)
VITAMIN D 25-HYDROXY: 18.2 NG/ML (ref 30–100)
VLDLC SERPL CALC-MCNC: NORMAL MG/DL (ref 1–30)
WBC # BLD: 5.6 K/UL (ref 3.5–11.3)

## 2018-05-11 PROCEDURE — 82550 ASSAY OF CK (CPK): CPT

## 2018-05-11 PROCEDURE — 80061 LIPID PANEL: CPT

## 2018-05-11 PROCEDURE — 82570 ASSAY OF URINE CREATININE: CPT

## 2018-05-11 PROCEDURE — 85027 COMPLETE CBC AUTOMATED: CPT

## 2018-05-11 PROCEDURE — 82043 UR ALBUMIN QUANTITATIVE: CPT

## 2018-05-11 PROCEDURE — 84443 ASSAY THYROID STIM HORMONE: CPT

## 2018-05-11 PROCEDURE — 36415 COLL VENOUS BLD VENIPUNCTURE: CPT

## 2018-05-11 PROCEDURE — 80048 BASIC METABOLIC PNL TOTAL CA: CPT

## 2018-05-11 PROCEDURE — 80076 HEPATIC FUNCTION PANEL: CPT

## 2018-05-11 PROCEDURE — 82306 VITAMIN D 25 HYDROXY: CPT

## 2018-05-11 PROCEDURE — 83036 HEMOGLOBIN GLYCOSYLATED A1C: CPT

## 2018-07-02 ENCOUNTER — ANESTHESIA (OUTPATIENT)
Dept: OPERATING ROOM | Age: 43
End: 2018-07-02
Payer: COMMERCIAL

## 2018-07-02 ENCOUNTER — HOSPITAL ENCOUNTER (EMERGENCY)
Age: 43
Discharge: HOME | End: 2018-07-02
Attending: EMERGENCY MEDICINE | Admitting: SURGERY
Payer: COMMERCIAL

## 2018-07-02 ENCOUNTER — APPOINTMENT (OUTPATIENT)
Dept: CT IMAGING | Age: 43
End: 2018-07-02
Payer: COMMERCIAL

## 2018-07-02 ENCOUNTER — ANESTHESIA EVENT (OUTPATIENT)
Dept: OPERATING ROOM | Age: 43
End: 2018-07-02
Payer: COMMERCIAL

## 2018-07-02 VITALS
WEIGHT: 272 LBS | TEMPERATURE: 97.5 F | OXYGEN SATURATION: 97 % | HEIGHT: 60 IN | HEART RATE: 81 BPM | SYSTOLIC BLOOD PRESSURE: 141 MMHG | RESPIRATION RATE: 20 BRPM | BODY MASS INDEX: 53.4 KG/M2 | DIASTOLIC BLOOD PRESSURE: 81 MMHG

## 2018-07-02 VITALS — SYSTOLIC BLOOD PRESSURE: 122 MMHG | OXYGEN SATURATION: 98 % | TEMPERATURE: 94.3 F | DIASTOLIC BLOOD PRESSURE: 80 MMHG

## 2018-07-02 DIAGNOSIS — K61.1 PERIRECTAL ABSCESS: ICD-10-CM

## 2018-07-02 DIAGNOSIS — L02.91 ABSCESS: Primary | ICD-10-CM

## 2018-07-02 LAB
-: ABNORMAL
-: NORMAL
ABSOLUTE EOS #: 0.24 K/UL (ref 0–0.4)
ABSOLUTE IMMATURE GRANULOCYTE: ABNORMAL K/UL (ref 0–0.3)
ABSOLUTE LYMPH #: 2.48 K/UL (ref 1–4.8)
ABSOLUTE MONO #: 0.64 K/UL (ref 0.1–1.3)
AMORPHOUS: ABNORMAL
ANION GAP SERPL CALCULATED.3IONS-SCNC: 14 MMOL/L (ref 9–17)
BACTERIA: ABNORMAL
BASOPHILS # BLD: 1 % (ref 0–2)
BASOPHILS ABSOLUTE: 0.08 K/UL (ref 0–0.2)
BILIRUBIN URINE: NEGATIVE
BUN BLDV-MCNC: 10 MG/DL (ref 6–20)
BUN/CREAT BLD: ABNORMAL (ref 9–20)
CALCIUM SERPL-MCNC: 8.9 MG/DL (ref 8.6–10.4)
CASTS UA: ABNORMAL /LPF
CHLORIDE BLD-SCNC: 103 MMOL/L (ref 98–107)
CHP ED QC CHECK: NORMAL
CO2: 23 MMOL/L (ref 20–31)
COLOR: YELLOW
COMMENT UA: ABNORMAL
CREAT SERPL-MCNC: 0.48 MG/DL (ref 0.5–0.9)
CRYSTALS, UA: ABNORMAL /HPF
DIFFERENTIAL TYPE: ABNORMAL
EOSINOPHILS RELATIVE PERCENT: 3 % (ref 0–4)
EPITHELIAL CELLS UA: ABNORMAL /HPF
GFR AFRICAN AMERICAN: >60 ML/MIN
GFR NON-AFRICAN AMERICAN: >60 ML/MIN
GFR SERPL CREATININE-BSD FRML MDRD: ABNORMAL ML/MIN/{1.73_M2}
GFR SERPL CREATININE-BSD FRML MDRD: ABNORMAL ML/MIN/{1.73_M2}
GLUCOSE BLD-MCNC: 112 MG/DL (ref 70–99)
GLUCOSE BLD-MCNC: 83 MG/DL (ref 65–105)
GLUCOSE URINE: NEGATIVE
HCG, PREGNANCY URINE (POC): NEGATIVE
HCT VFR BLD CALC: 29.5 % (ref 36–46)
HEMOGLOBIN: 9.4 G/DL (ref 12–16)
IMMATURE GRANULOCYTES: ABNORMAL %
KETONES, URINE: NEGATIVE
LACTIC ACID: 1.3 MMOL/L (ref 0.5–2.2)
LEUKOCYTE ESTERASE, URINE: NEGATIVE
LYMPHOCYTES # BLD: 31 % (ref 24–44)
MCH RBC QN AUTO: 23.8 PG (ref 26–34)
MCHC RBC AUTO-ENTMCNC: 31.9 G/DL (ref 31–37)
MCV RBC AUTO: 74.5 FL (ref 80–100)
MONOCYTES # BLD: 8 % (ref 1–7)
MORPHOLOGY: ABNORMAL
MUCUS: ABNORMAL
NITRITE, URINE: NEGATIVE
NRBC AUTOMATED: ABNORMAL PER 100 WBC
OTHER OBSERVATIONS UA: ABNORMAL
PDW BLD-RTO: 19.5 % (ref 11.5–14.9)
PH UA: 6 (ref 5–8)
PLATELET # BLD: 322 K/UL (ref 150–450)
PLATELET ESTIMATE: ABNORMAL
PMV BLD AUTO: 9.5 FL (ref 6–12)
POTASSIUM SERPL-SCNC: 3.5 MMOL/L (ref 3.7–5.3)
PREGNANCY TEST URINE, POC: NEGATIVE
PROTEIN UA: NEGATIVE
RBC # BLD: 3.96 M/UL (ref 4–5.2)
RBC # BLD: ABNORMAL 10*6/UL
RBC UA: ABNORMAL /HPF
RENAL EPITHELIAL, UA: ABNORMAL /HPF
SEG NEUTROPHILS: 57 % (ref 36–66)
SEGMENTED NEUTROPHILS ABSOLUTE COUNT: 4.56 K/UL (ref 1.3–9.1)
SODIUM BLD-SCNC: 140 MMOL/L (ref 135–144)
SPECIFIC GRAVITY UA: 1.02 (ref 1–1.03)
TRICHOMONAS: ABNORMAL
TURBIDITY: ABNORMAL
URINE HGB: ABNORMAL
UROBILINOGEN, URINE: NORMAL
WBC # BLD: 8 K/UL (ref 3.5–11)
WBC # BLD: ABNORMAL 10*3/UL
WBC UA: ABNORMAL /HPF
YEAST: ABNORMAL

## 2018-07-02 PROCEDURE — 87075 CULTR BACTERIA EXCEPT BLOOD: CPT

## 2018-07-02 PROCEDURE — 96374 THER/PROPH/DIAG INJ IV PUSH: CPT

## 2018-07-02 PROCEDURE — 87070 CULTURE OTHR SPECIMN AEROBIC: CPT

## 2018-07-02 PROCEDURE — 36415 COLL VENOUS BLD VENIPUNCTURE: CPT

## 2018-07-02 PROCEDURE — 86403 PARTICLE AGGLUT ANTBDY SCRN: CPT

## 2018-07-02 PROCEDURE — 87076 CULTURE ANAEROBE IDENT EACH: CPT

## 2018-07-02 PROCEDURE — 84703 CHORIONIC GONADOTROPIN ASSAY: CPT

## 2018-07-02 PROCEDURE — 6360000002 HC RX W HCPCS: Performed by: ANESTHESIOLOGY

## 2018-07-02 PROCEDURE — 87186 SC STD MICRODIL/AGAR DIL: CPT

## 2018-07-02 PROCEDURE — 96376 TX/PRO/DX INJ SAME DRUG ADON: CPT

## 2018-07-02 PROCEDURE — 83605 ASSAY OF LACTIC ACID: CPT

## 2018-07-02 PROCEDURE — 87185 SC STD ENZYME DETCJ PER NZM: CPT

## 2018-07-02 PROCEDURE — 87205 SMEAR GRAM STAIN: CPT

## 2018-07-02 PROCEDURE — 7100000031 HC ASPR PHASE II RECOVERY - ADDTL 15 MIN: Performed by: SURGERY

## 2018-07-02 PROCEDURE — 2500000003 HC RX 250 WO HCPCS: Performed by: ANESTHESIOLOGY

## 2018-07-02 PROCEDURE — 2580000003 HC RX 258: Performed by: PHYSICIAN ASSISTANT

## 2018-07-02 PROCEDURE — 82947 ASSAY GLUCOSE BLOOD QUANT: CPT

## 2018-07-02 PROCEDURE — 81001 URINALYSIS AUTO W/SCOPE: CPT

## 2018-07-02 PROCEDURE — 6360000002 HC RX W HCPCS: Performed by: PHYSICIAN ASSISTANT

## 2018-07-02 PROCEDURE — 3600000012 HC SURGERY LEVEL 2 ADDTL 15MIN: Performed by: SURGERY

## 2018-07-02 PROCEDURE — 85025 COMPLETE CBC W/AUTO DIFF WBC: CPT

## 2018-07-02 PROCEDURE — 99285 EMERGENCY DEPT VISIT HI MDM: CPT

## 2018-07-02 PROCEDURE — 87077 CULTURE AEROBIC IDENTIFY: CPT

## 2018-07-02 PROCEDURE — 3700000001 HC ADD 15 MINUTES (ANESTHESIA): Performed by: SURGERY

## 2018-07-02 PROCEDURE — 96375 TX/PRO/DX INJ NEW DRUG ADDON: CPT

## 2018-07-02 PROCEDURE — 2580000003 HC RX 258: Performed by: SURGERY

## 2018-07-02 PROCEDURE — 3700000000 HC ANESTHESIA ATTENDED CARE: Performed by: SURGERY

## 2018-07-02 PROCEDURE — 2500000003 HC RX 250 WO HCPCS: Performed by: SURGERY

## 2018-07-02 PROCEDURE — 6360000002 HC RX W HCPCS: Performed by: SURGERY

## 2018-07-02 PROCEDURE — 7100000001 HC PACU RECOVERY - ADDTL 15 MIN: Performed by: SURGERY

## 2018-07-02 PROCEDURE — 6360000004 HC RX CONTRAST MEDICATION: Performed by: PHYSICIAN ASSISTANT

## 2018-07-02 PROCEDURE — 74177 CT ABD & PELVIS W/CONTRAST: CPT

## 2018-07-02 PROCEDURE — 80048 BASIC METABOLIC PNL TOTAL CA: CPT

## 2018-07-02 PROCEDURE — 7100000030 HC ASPR PHASE II RECOVERY - FIRST 15 MIN: Performed by: SURGERY

## 2018-07-02 PROCEDURE — 3600000002 HC SURGERY LEVEL 2 BASE: Performed by: SURGERY

## 2018-07-02 PROCEDURE — 6370000000 HC RX 637 (ALT 250 FOR IP): Performed by: ANESTHESIOLOGY

## 2018-07-02 PROCEDURE — 7100000000 HC PACU RECOVERY - FIRST 15 MIN: Performed by: SURGERY

## 2018-07-02 RX ORDER — BUPIVACAINE HYDROCHLORIDE 5 MG/ML
INJECTION, SOLUTION EPIDURAL; INTRACAUDAL PRN
Status: DISCONTINUED | OUTPATIENT
Start: 2018-07-02 | End: 2018-07-02 | Stop reason: HOSPADM

## 2018-07-02 RX ORDER — LABETALOL HYDROCHLORIDE 5 MG/ML
5 INJECTION, SOLUTION INTRAVENOUS EVERY 10 MIN PRN
Status: DISCONTINUED | OUTPATIENT
Start: 2018-07-02 | End: 2018-07-02 | Stop reason: HOSPADM

## 2018-07-02 RX ORDER — FENTANYL CITRATE 50 UG/ML
25 INJECTION, SOLUTION INTRAMUSCULAR; INTRAVENOUS EVERY 5 MIN PRN
Status: DISCONTINUED | OUTPATIENT
Start: 2018-07-02 | End: 2018-07-02 | Stop reason: HOSPADM

## 2018-07-02 RX ORDER — HYDROCODONE BITARTRATE AND ACETAMINOPHEN 5; 325 MG/1; MG/1
2 TABLET ORAL PRN
Status: COMPLETED | OUTPATIENT
Start: 2018-07-02 | End: 2018-07-02

## 2018-07-02 RX ORDER — DOXYCYCLINE HYCLATE 100 MG/1
100 CAPSULE ORAL 2 TIMES DAILY
Qty: 28 CAPSULE | Refills: 0 | Status: SHIPPED | OUTPATIENT
Start: 2018-07-02 | End: 2018-07-16

## 2018-07-02 RX ORDER — MORPHINE SULFATE 2 MG/ML
2 INJECTION, SOLUTION INTRAMUSCULAR; INTRAVENOUS EVERY 5 MIN PRN
Status: DISCONTINUED | OUTPATIENT
Start: 2018-07-02 | End: 2018-07-02 | Stop reason: HOSPADM

## 2018-07-02 RX ORDER — GLYCOPYRROLATE 1 MG/5 ML
SYRINGE (ML) INTRAVENOUS PRN
Status: DISCONTINUED | OUTPATIENT
Start: 2018-07-02 | End: 2018-07-02 | Stop reason: SDUPTHER

## 2018-07-02 RX ORDER — MEPERIDINE HYDROCHLORIDE 50 MG/ML
12.5 INJECTION INTRAMUSCULAR; INTRAVENOUS; SUBCUTANEOUS EVERY 5 MIN PRN
Status: DISCONTINUED | OUTPATIENT
Start: 2018-07-02 | End: 2018-07-02 | Stop reason: HOSPADM

## 2018-07-02 RX ORDER — ONDANSETRON 2 MG/ML
4 INJECTION INTRAMUSCULAR; INTRAVENOUS ONCE
Status: COMPLETED | OUTPATIENT
Start: 2018-07-02 | End: 2018-07-02

## 2018-07-02 RX ORDER — 0.9 % SODIUM CHLORIDE 0.9 %
80 INTRAVENOUS SOLUTION INTRAVENOUS ONCE
Status: COMPLETED | OUTPATIENT
Start: 2018-07-02 | End: 2018-07-02

## 2018-07-02 RX ORDER — MIDAZOLAM HYDROCHLORIDE 1 MG/ML
INJECTION INTRAMUSCULAR; INTRAVENOUS PRN
Status: DISCONTINUED | OUTPATIENT
Start: 2018-07-02 | End: 2018-07-02 | Stop reason: SDUPTHER

## 2018-07-02 RX ORDER — PROPOFOL 10 MG/ML
INJECTION, EMULSION INTRAVENOUS PRN
Status: DISCONTINUED | OUTPATIENT
Start: 2018-07-02 | End: 2018-07-02 | Stop reason: SDUPTHER

## 2018-07-02 RX ORDER — ONDANSETRON 2 MG/ML
INJECTION INTRAMUSCULAR; INTRAVENOUS PRN
Status: DISCONTINUED | OUTPATIENT
Start: 2018-07-02 | End: 2018-07-02 | Stop reason: SDUPTHER

## 2018-07-02 RX ORDER — HYDRALAZINE HYDROCHLORIDE 20 MG/ML
5 INJECTION INTRAMUSCULAR; INTRAVENOUS EVERY 10 MIN PRN
Status: DISCONTINUED | OUTPATIENT
Start: 2018-07-02 | End: 2018-07-02 | Stop reason: HOSPADM

## 2018-07-02 RX ORDER — FENTANYL CITRATE 50 UG/ML
INJECTION, SOLUTION INTRAMUSCULAR; INTRAVENOUS PRN
Status: DISCONTINUED | OUTPATIENT
Start: 2018-07-02 | End: 2018-07-02 | Stop reason: SDUPTHER

## 2018-07-02 RX ORDER — ONDANSETRON 2 MG/ML
4 INJECTION INTRAMUSCULAR; INTRAVENOUS
Status: DISCONTINUED | OUTPATIENT
Start: 2018-07-02 | End: 2018-07-02 | Stop reason: HOSPADM

## 2018-07-02 RX ORDER — ROCURONIUM BROMIDE 10 MG/ML
INJECTION, SOLUTION INTRAVENOUS PRN
Status: DISCONTINUED | OUTPATIENT
Start: 2018-07-02 | End: 2018-07-02 | Stop reason: SDUPTHER

## 2018-07-02 RX ORDER — SUCCINYLCHOLINE CHLORIDE 20 MG/ML
INJECTION INTRAMUSCULAR; INTRAVENOUS PRN
Status: DISCONTINUED | OUTPATIENT
Start: 2018-07-02 | End: 2018-07-02 | Stop reason: SDUPTHER

## 2018-07-02 RX ORDER — MORPHINE SULFATE 2 MG/ML
2 INJECTION, SOLUTION INTRAMUSCULAR; INTRAVENOUS ONCE
Status: COMPLETED | OUTPATIENT
Start: 2018-07-02 | End: 2018-07-02

## 2018-07-02 RX ORDER — NEOSTIGMINE METHYLSULFATE 5 MG/5 ML
SYRINGE (ML) INTRAVENOUS PRN
Status: DISCONTINUED | OUTPATIENT
Start: 2018-07-02 | End: 2018-07-02 | Stop reason: SDUPTHER

## 2018-07-02 RX ORDER — SODIUM CHLORIDE 9 MG/ML
INJECTION, SOLUTION INTRAVENOUS CONTINUOUS
Status: DISCONTINUED | OUTPATIENT
Start: 2018-07-02 | End: 2018-07-02 | Stop reason: HOSPADM

## 2018-07-02 RX ORDER — SODIUM CHLORIDE 0.9 % (FLUSH) 0.9 %
10 SYRINGE (ML) INJECTION PRN
Status: DISCONTINUED | OUTPATIENT
Start: 2018-07-02 | End: 2018-07-02 | Stop reason: HOSPADM

## 2018-07-02 RX ORDER — HYDROCODONE BITARTRATE AND ACETAMINOPHEN 5; 325 MG/1; MG/1
1 TABLET ORAL PRN
Status: COMPLETED | OUTPATIENT
Start: 2018-07-02 | End: 2018-07-02

## 2018-07-02 RX ORDER — METOCLOPRAMIDE HYDROCHLORIDE 5 MG/ML
10 INJECTION INTRAMUSCULAR; INTRAVENOUS
Status: DISCONTINUED | OUTPATIENT
Start: 2018-07-02 | End: 2018-07-02 | Stop reason: HOSPADM

## 2018-07-02 RX ORDER — FENTANYL CITRATE 50 UG/ML
50 INJECTION, SOLUTION INTRAMUSCULAR; INTRAVENOUS EVERY 5 MIN PRN
Status: DISCONTINUED | OUTPATIENT
Start: 2018-07-02 | End: 2018-07-02 | Stop reason: HOSPADM

## 2018-07-02 RX ORDER — OXYCODONE HYDROCHLORIDE AND ACETAMINOPHEN 5; 325 MG/1; MG/1
1 TABLET ORAL EVERY 6 HOURS PRN
Qty: 20 TABLET | Refills: 0 | Status: SHIPPED | OUTPATIENT
Start: 2018-07-02 | End: 2018-07-07

## 2018-07-02 RX ORDER — DIPHENHYDRAMINE HYDROCHLORIDE 50 MG/ML
12.5 INJECTION INTRAMUSCULAR; INTRAVENOUS
Status: DISCONTINUED | OUTPATIENT
Start: 2018-07-02 | End: 2018-07-02 | Stop reason: HOSPADM

## 2018-07-02 RX ADMIN — PROPOFOL 200 MG: 10 INJECTION, EMULSION INTRAVENOUS at 13:26

## 2018-07-02 RX ADMIN — Medication 4 MG: at 13:45

## 2018-07-02 RX ADMIN — VANCOMYCIN HYDROCHLORIDE 1 G: 1 INJECTION, POWDER, LYOPHILIZED, FOR SOLUTION INTRAVENOUS at 13:30

## 2018-07-02 RX ADMIN — SODIUM CHLORIDE 80 ML: 9 INJECTION, SOLUTION INTRAVENOUS at 10:13

## 2018-07-02 RX ADMIN — SODIUM CHLORIDE: 9 INJECTION, SOLUTION INTRAVENOUS at 12:58

## 2018-07-02 RX ADMIN — FENTANYL CITRATE 50 MCG: 50 INJECTION, SOLUTION INTRAMUSCULAR; INTRAVENOUS at 13:26

## 2018-07-02 RX ADMIN — ONDANSETRON 4 MG: 2 INJECTION, SOLUTION INTRAMUSCULAR; INTRAVENOUS at 09:27

## 2018-07-02 RX ADMIN — ROCURONIUM BROMIDE 20 MG: 10 INJECTION INTRAVENOUS at 13:35

## 2018-07-02 RX ADMIN — MORPHINE SULFATE 2 MG: 2 INJECTION, SOLUTION INTRAMUSCULAR; INTRAVENOUS at 09:28

## 2018-07-02 RX ADMIN — ONDANSETRON 4 MG: 2 INJECTION INTRAMUSCULAR; INTRAVENOUS at 13:42

## 2018-07-02 RX ADMIN — Medication 0.6 MG: at 13:45

## 2018-07-02 RX ADMIN — MIDAZOLAM 2 MG: 1 INJECTION INTRAMUSCULAR; INTRAVENOUS at 13:20

## 2018-07-02 RX ADMIN — Medication 10 ML: at 10:13

## 2018-07-02 RX ADMIN — HYDROCODONE BITARTRATE AND ACETAMINOPHEN 1 TABLET: 5; 325 TABLET ORAL at 15:15

## 2018-07-02 RX ADMIN — FENTANYL CITRATE 50 MCG: 50 INJECTION, SOLUTION INTRAMUSCULAR; INTRAVENOUS at 13:41

## 2018-07-02 RX ADMIN — Medication 140 MG: at 13:25

## 2018-07-02 RX ADMIN — MORPHINE SULFATE 2 MG: 2 INJECTION, SOLUTION INTRAMUSCULAR; INTRAVENOUS at 11:55

## 2018-07-02 RX ADMIN — IOPAMIDOL 75 ML: 755 INJECTION, SOLUTION INTRAVENOUS at 10:13

## 2018-07-02 RX ADMIN — ROCURONIUM BROMIDE 10 MG: 10 INJECTION INTRAVENOUS at 13:25

## 2018-07-02 ASSESSMENT — PAIN SCALES - GENERAL
PAINLEVEL_OUTOF10: 0
PAINLEVEL_OUTOF10: 6
PAINLEVEL_OUTOF10: 10
PAINLEVEL_OUTOF10: 9
PAINLEVEL_OUTOF10: 8
PAINLEVEL_OUTOF10: 0
PAINLEVEL_OUTOF10: 4
PAINLEVEL_OUTOF10: 6

## 2018-07-02 ASSESSMENT — PULMONARY FUNCTION TESTS
PIF_VALUE: 29
PIF_VALUE: 27
PIF_VALUE: 26
PIF_VALUE: 25
PIF_VALUE: 20
PIF_VALUE: 2
PIF_VALUE: 28
PIF_VALUE: 11
PIF_VALUE: 28
PIF_VALUE: 34
PIF_VALUE: 28
PIF_VALUE: 28
PIF_VALUE: 26
PIF_VALUE: 28
PIF_VALUE: 30
PIF_VALUE: 29
PIF_VALUE: 29
PIF_VALUE: 1
PIF_VALUE: 30
PIF_VALUE: 28
PIF_VALUE: 29
PIF_VALUE: 11
PIF_VALUE: 8
PIF_VALUE: 29
PIF_VALUE: 1
PIF_VALUE: 26
PIF_VALUE: 28
PIF_VALUE: 25
PIF_VALUE: 28
PIF_VALUE: 30
PIF_VALUE: 28
PIF_VALUE: 32
PIF_VALUE: 33

## 2018-07-02 ASSESSMENT — PAIN DESCRIPTION - PAIN TYPE
TYPE: ACUTE PAIN
TYPE: SURGICAL PAIN
TYPE: ACUTE PAIN

## 2018-07-02 ASSESSMENT — PAIN DESCRIPTION - LOCATION
LOCATION: RECTUM
LOCATION: RECTUM

## 2018-07-02 ASSESSMENT — PAIN DESCRIPTION - FREQUENCY: FREQUENCY: CONTINUOUS

## 2018-07-02 ASSESSMENT — PAIN DESCRIPTION - DESCRIPTORS: DESCRIPTORS: PRESSURE

## 2018-07-02 ASSESSMENT — LIFESTYLE VARIABLES: SMOKING_STATUS: 1

## 2018-07-02 NOTE — H&P
times daily 13 tablet 0    albuterol sulfate HFA (PROVENTIL HFA) 108 (90 BASE) MCG/ACT inhaler Inhale 1-2 puffs into the lungs every 4 hours as needed for Wheezing 1 Inhaler 0    Misc. Devices (SITZ BATH) MISC 1 Units by Does not apply route three times daily 1 each 1    docusate sodium (COLACE) 100 MG capsule Take 1 capsule by mouth 2 times daily 30 capsule 2    ibuprofen (ADVIL;MOTRIN) 800 MG tablet Take 1 tablet by mouth every 8 hours as needed for Pain 30 tablet 0    aspirin-acetaminophen-caffeine (EXCEDRIN MIGRAINE) 250-250-65 MG per tablet Take 1 tablet by mouth every 6 hours as needed for Pain.  acetaminophen (TYLENOL) 500 MG tablet Take 500 mg by mouth every 6 hours as needed for Pain.  benzocaine (ORAJEL) 10 % mucosal gel Take  by mouth as needed for Pain. Take by mouth as needed.  omeprazole (PRILOSEC) 20 MG capsule Take 20 mg by mouth daily Indications: not currently taking, patient states she has them                         General health:  Patient states health is pretty good, Has developed abcess               Skin:  No itching, redness or rash. No skin lesions. Head, eyes, ears, nose, throat: Amor history of head injury or headaches, No changes in vision or hearing        Neck:  No pain, stiffness or masses. Cardiovascular/Respiratory system:  Has HTN and dyslipidemia,  Has no CAD No chest pain or dyspnea on exertion. .               Gastrointestinal tract: Denies any nausea, vomiting, or change in bowel habits,     Genitourinary:  Currently has no UTI sx or pain with voiding. Locomotor:  Steady gait,   No pain with ROM     Neuropsychiatric:  No referable complaints. See HPI. Rectal abscess    GENERAL PHYSICAL EXAM:         Vitals: BP (!) 113/92   Pulse 62   Temp 98.3 °F (36.8 °C)   Resp 20   Ht 5' (1.524 m)   Wt 272 lb (123.4 kg)   LMP 06/25/2018 (Approximate)   SpO2 99%   BMI 53.12 kg/m²  Body mass index is 53.12 kg/m².      GENERAL APPEARANCE:  [employer]

## 2018-07-02 NOTE — H&P
General Surgery   History and Physical      PATIENT NAME: Quinten Barney   YOB: 1975    ADMISSION DATE: 7/2/2018  8:57 AM      TODAY'S DATE: 7/2/2018    CHIEF COMPLAINT: perirectal abscess      HISTORY OF PRESENT ILLNESS:  The patient is a 38 y.o. female  who presents with left gluteal/perirectal abscess that she states is very painful. She states that the pain began yesterday and has gradually worsened. Denies fever or chills. She has had an abscess drained in the same area approximately two years ago. She has multiple scars in the groin, breasts, and abdominal pannus from hydradenitis suppurativa. She does smoke and has a history of diabetes.     Past Medical History:        Diagnosis Date    Diabetes mellitus ([address]     GERD (gastroesophageal reflux disease)     Hidradenitis suppurativa     Hyperlipidemia     Hypertension     Obesity, morbid, BMI 50 or higher ([facility]     Sleep apnea     CPAP use at 16       Past Surgical History:        Procedure Laterality Date    ABSCESS DRAINAGE      ABSCESS DRAINAGE  12/23/2016    BUTTOCK    TUBAL LIGATION         Medications Prior to Admission:   Prescriptions Prior to Admission: glipiZIDE (GLUCOTROL) 10 MG tablet, Take 10 mg by mouth 2 times daily (before meals) Indications: patient not currently taking   lisinopril (PRINIVIL;ZESTRIL) 10 MG tablet, Take 10 mg by mouth daily Indications: not currently taking   ondansetron (ZOFRAN ODT) 4 MG disintegrating tablet, Take 1 tablet by mouth every 12 hours as needed for Nausea  famotidine (PEPCID) 20 MG tablet, Take 1 tablet by mouth 2 times daily  traMADol (ULTRAM) 50 MG tablet, Take 1 tablet by mouth every 6 hours as needed for Pain  amoxicillin-clavulanate (AUGMENTIN) 875-125 MG per tablet, Take 1 tablet by mouth 2 times daily  metroNIDAZOLE (FLAGYL) 500 MG tablet, Take 1 tablet by mouth 2 times daily  albuterol sulfate HFA (PROVENTIL HFA) 108 (90 BASE) MCG/ACT inhaler, Inhale 1-2 puffs into the problems  Hematological and Lymphatic ROS:No Lymphoma, Von Willebrand's, Hemophillia or Bleeding History  Psych ROS: No Depression, Homicidal thoughts,suicidal thoughts, or anxiety  Breast ROS: No prior breast abnormalities or lumps  Respiratory ROS: No SOB, Pneumoniae,Cough, or Pulmonary Embolism History  Cardiovascular ROS: No Chest Pain with Exertion, Palpitations, Syncope, Edema, Arrhythmia  Gastrointestinal ROS: No Indigestion, Heartburn, Nausea, vomiting, Diarrhea, Constipation,or Bowel Changes; No Bloody Stools or melena  Genito-Urinary ROS: No Dysuria, Hematuria or Nocturia.  No Urinary Incontinence or Vaginal Discharge  Musculoskeletal ROS: No Arthralgia, Arthritis,Gout,Osteoporosis or Rheumatism  Neurological ROS: No CVA, Migraines, Epilepsy, Seizure Hx, or Limb Weakness  Dermatological ROS: No Rash, Itching, Hives, Mole Changes or Cancer     PHYSICAL EXAM:    VITALS:  /82   Pulse 74   Temp 98.3 °F (36.8 °C)   Resp 20   Ht 5' (1.524 m)   Wt 272 lb (123.4 kg)   LMP 06/25/2018 (Approximate)   SpO2 100%   BMI 53.12 kg/m²   INTAKE/OUTPUT:   No intake or output data in the 24 hours ending 07/02/18 1319    CONSTITUTIONAL:  awake, alert, not distressed and morbidly obese  Head:  normocephalic/atraumatic, without obvious abnormality  Eyes: PERRL, Sclera non icteric  Mouth: dentition is good, mucous membranes moist and pink  NECK:  supple, symmetrical, trachea midline, no lymphadenopathy, no thyroid nodules, no carotid bruits  LUNGS:  CTA bilaterally, no ronchi, rales, or wheezes, no intercostal muscle retractions or accessory muscle use  CARDIOVASCULAR:  regular rate and rhythm and No Murmur, rub,  Or gallops  ABDOMEN: soft,  No surgical scars, present,  non distended,  No hepato-organomegaly non-tender, no guarding present, no masses and no hernias  MUSCULOSKELETAL:  negative, there is not obvious somatic dysfunction  NEUROLOGIC:  Mental Status Exam:  Level of Alertness:   alert  Orientation: oriented to person, place, and time  pshych judgement and insight is normal  Skin: no rashes, lesions, or ulcers    CBC with Differential:    Lab Results   Component Value Date    WBC 8.0 07/02/2018    RBC 3.96 07/02/2018    HGB 9.4 07/02/2018    HCT 29.5 07/02/2018     07/02/2018    MCV 74.5 07/02/2018    MCH 23.8 07/02/2018    MCHC 31.9 07/02/2018    RDW 19.5 07/02/2018    LYMPHOPCT 31 07/02/2018    MONOPCT 8 07/02/2018    BASOPCT 1 07/02/2018    MONOSABS 0.64 07/02/2018    LYMPHSABS 2.48 07/02/2018    EOSABS 0.24 07/02/2018    BASOSABS 0.08 07/02/2018    DIFFTYPE NOT REPORTED 07/02/2018     BMP:    Lab Results   Component Value Date     07/02/2018    K 3.5 07/02/2018     07/02/2018    CO2 23 07/02/2018    BUN 10 07/02/2018    LABALBU 3.7 05/11/2018    CREATININE 0.48 07/02/2018    CALCIUM 8.9 07/02/2018    GFRAA >60 07/02/2018    LABGLOM >60 07/02/2018    GLUCOSE 112 07/02/2018       Pertinent Radiology: Ct Abdomen Pelvis W Iv Contrast Additional Contrast? None    Result Date: 7/2/2018  EXAMINATION: CT OF THE ABDOMEN AND PELVIS WITH CONTRAST 7/2/2018 10:14 am TECHNIQUE: CT of the abdomen and pelvis was performed with the administration of intravenous contrast. Multiplanar reformatted images are provided for review. Dose modulation, iterative reconstruction, and/or weight based adjustment of the mA/kV was utilized to reduce the radiation dose to as low as reasonably achievable. COMPARISON: November 28, 2017 HISTORY: ORDERING SYSTEM PROVIDED HISTORY: ABSCESS, PELVIS TECHNOLOGIST PROVIDED HISTORY: Additional Contrast?->None Ordering Physician Provided Reason for Exam: PATIENT COMPLAINS OF PAIN SAME PLACE AS BEFORE IN THE RECTAL AREA, THE PAIN STARTED YESTERDAY LAST TIME WAS A YEAR AGO Acuity: Unknown Type of Exam: Unknown FINDINGS: Lower Chest: The visualized lung bases are clear. Organs: The liver is mildly enlarged measuring 21 cm and has features of steatosis.   The gallbladder, pancreas, spleen, adrenals and kidneys reveal no acute findings. GI/Bowel: There is no bowel dilatation or wall thickening identified. Normal appendix. Pelvis: The bladder, uterus and adnexa are within normal limits. There is a small focus of gas and fluid in the subdermal medial right gluteal region measuring 1.5 x 1.0 x 2.4 cm. Peritoneum/Retroperitoneum: No free air or free fluid. The aorta is normal in caliber. The visceral branches are patent. No lymphadenopathy. Bones/Soft Tissues: No abnormality identified. Subdermal 1.5 x 1.0 x 2.4 cm collection of fluid and gas in the medial right gluteal region. This may represent a superficial abscess or open wound. Mild hepatomegaly with features of steatosis. ASSESSMENT   Patient Active Problem List   Diagnosis    Hyperlipidemia    Hypertension    Diabetes mellitus ([address]    GERD (gastroesophageal reflux disease)    Obesity, morbid, BMI 50 or higher (HCC)    Hidradenitis suppurativa    Sleep apnea    Perirectal abscess    Controlled type 2 diabetes mellitus without complication, with long-term current use of insulin (HCC)    Iron deficiency anemia    Dysmenorrhea    Menorrhagia    Acute cystitis without hematuria       PLAN    1. To OR for incision and drainage of abscess  2.  Will need home care on discharge        Electronically signed by Ellie Fam DO  on 7/2/2018 at 1:19 PM

## 2018-07-02 NOTE — ANESTHESIA POSTPROCEDURE EVALUATION
POST- ANESTHESIA EVALUATION       Pt Name: Tyra Parks  MRN: [id number]  [place]: 1975  Date of evaluation: 7/2/2018  Time:  2:53 PM      /80   Pulse 63   Temp 98.2 °F (36.8 °C)   Resp 14   Ht 5' (1.524 m)   Wt 272 lb (123.4 kg)   LMP 06/25/2018 (Approximate)   SpO2 94%   BMI 53.12 kg/m²      Consciousness Level  Awake  Cardiopulmonary Status  Stable  Pain Adequately Treated YES  Nausea / Vomiting  NO  Adequate Hydration  YES  Anesthesia Related Complications NONE      Electronically signed by Krystal Quesada MD on 7/2/2018 at 2:53 PM       Department of Anesthesiology  Postprocedure Note    Patient: Tyra Parks  MRN: [id number]  [place]: 1975  Date of evaluation: 7/2/2018  Time:  2:53 PM     Procedure Summary     Date:  07/02/18 Room / Location:  [address] [place] [address] Don Zuniga [address] / [address] [place] [address] Don Zuniga    Anesthesia Start:  1320 Anesthesia Stop:  1400    Procedure:  RECTAL PERIRECTAL INCISION AND DRAINAGE (N/A Anus) Diagnosis:  (PERIRECTAL ABSCESS )    Surgeon:  Giuliana Degroot DO Responsible Provider:  Krystal Quesada MD    Anesthesia Type:  general ASA Status:  3          Anesthesia Type: general    Deejay Phase I: Deejay Score: 8    Deejay Phase II:      Last vitals: Reviewed and per EMR flowsheets.        Anesthesia Post Evaluation

## 2018-07-02 NOTE — ED NOTES
Pt ambulated to bathroom without assistance or difficulty; gait even and steady.       Chelly Ramos RN  07/02/18 7648

## 2018-07-02 NOTE — ANESTHESIA PRE PROCEDURE
every 6 hours as needed for Pain. Historical Provider, MD   acetaminophen (TYLENOL) 500 MG tablet Take 500 mg by mouth every 6 hours as needed for Pain. Historical Provider, MD   benzocaine (ORAJEL) 10 % mucosal gel Take  by mouth as needed for Pain. Take by mouth as needed.     Historical Provider, MD   omeprazole (PRILOSEC) 20 MG capsule Take 20 mg by mouth daily Indications: not currently taking, patient states she has them     Historical Provider, MD       Current medications:    Current Facility-Administered Medications   Medication Dose Route Frequency Provider Last Rate Last Dose    [MAR Hold] sodium chloride flush 0.9 % injection 10 mL  10 mL Intravenous PRN Francia Vides PA-C   10 mL at 07/02/18 1013       Allergies:  No Known Allergies    Problem List:    Patient Active Problem List   Diagnosis Code    Hyperlipidemia E78.5    Hypertension I10    Diabetes mellitus ([facility] E11.9    GERD (gastroesophageal reflux disease) K21.9    Obesity, morbid, BMI 50 or higher ([facility]) E66.01    Hidradenitis suppurativa L73.2    Sleep apnea G47.30    Perirectal abscess K61.1    Controlled type 2 diabetes mellitus without complication, with long-term current use of insulin ([facility]) E11.9, Z79.4    Iron deficiency anemia D50.9    Dysmenorrhea N94.6    Menorrhagia N92.0    Acute cystitis without hematuria N30.00       Past Medical History:        Diagnosis Date    Diabetes mellitus ([facility]     GERD (gastroesophageal reflux disease)     Hidradenitis suppurativa     Hyperlipidemia     Hypertension     [facility], morbid, BMI 50 or higher [facility]     Sleep apnea     CPAP use at 16       Past Surgical History:        Procedure Laterality Date    ABSCESS DRAINAGE      ABSCESS DRAINAGE  12/23/2016    BUTTOCK    TUBAL LIGATION         Social History:    Social History   Substance Use Topics    Smoking status: Current Every Day Smoker     Packs/day: 0.50     Start date: 12/23/1982    Smokeless tobacco: Never Used  Alcohol use Yes      Comment: Occasionally                                Ready to quit: Not Answered  Counseling given: Not Answered      Vital Signs (Current):   Vitals:    07/02/18 1038 07/02/18 1117 07/02/18 1155 07/02/18 1216   BP: (!) 117/56 (!) 113/92 (!) 127/90 133/82   Pulse: 66 62 68 74   Resp: 18 20 22 20   Temp:       SpO2: 99% 99% 99% 100%   Weight:       Height:                                                  BP Readings from Last 3 Encounters:   07/02/18 133/82   11/28/17 137/84   10/07/17 (!) 148/86       NPO Status:                                                                                 BMI:   Wt Readings from Last 3 Encounters:   07/02/18 272 lb (123.4 kg)   11/27/17 262 lb (118.8 kg)   10/07/17 262 lb (118.8 kg)     Body mass index is 53.12 kg/m². CBC:   Lab Results   Component Value Date    WBC 8.0 07/02/2018    RBC 3.96 07/02/2018    HGB 9.4 07/02/2018    HCT 29.5 07/02/2018    MCV 74.5 07/02/2018    RDW 19.5 07/02/2018     07/02/2018       CMP:   Lab Results   Component Value Date     07/02/2018    K 3.5 07/02/2018     07/02/2018    CO2 23 07/02/2018    BUN 10 07/02/2018    CREATININE 0.48 07/02/2018    GFRAA >60 07/02/2018    LABGLOM >60 07/02/2018    GLUCOSE 112 07/02/2018    PROT 7.2 05/11/2018    CALCIUM 8.9 07/02/2018    BILITOT 0.19 05/11/2018    ALKPHOS 49 05/11/2018    AST 11 05/11/2018    ALT 10 05/11/2018       POC Tests: No results for input(s): POCGLU, POCNA, POCK, POCCL, POCBUN, POCHEMO, POCHCT in the last 72 hours.     Coags:   Lab Results   Component Value Date    PROTIME 11.4 08/11/2013    INR 1.1 08/11/2013    APTT 26.1 08/11/2013       HCG (If Applicable):   Lab Results   Component Value Date    PREGTESTUR negative 07/02/2018    HCG NEGATIVE 07/02/2018        ABGs: No results found for: PHART, PO2ART, MGK3ZOJ, BPQ8YPF, BEART, J7IVCFYQ     Type & Screen (If Applicable):  No results found for: LABABO, [address]    Anesthesia Evaluation  Patient summary reviewed and Nursing notes reviewed  Airway: Mallampati: II  TM distance: >3 FB   Neck ROM: full  Mouth opening: > = 3 FB Dental: normal exam         Pulmonary:normal exam    (+) sleep apnea:  current smoker                           Cardiovascular:    (+) hypertension:, hyperlipidemia      ECG reviewed  Rhythm: regular  Rate: normal                    Neuro/Psych:               GI/Hepatic/Renal:   (+) GERD:, morbid obesity          Endo/Other:    (+) DiabetesType II DM, , .                 Abdominal:   (+) obese,         Vascular:                                        Anesthesia Plan      general     ASA 3       Induction: intravenous. MIPS: Postoperative opioids intended and Prophylactic antiemetics administered. Anesthetic plan and risks discussed with patient.                       Sary Hedrick MD   7/2/2018

## 2018-07-02 NOTE — ED PROVIDER NOTES
lisinopril (PRINIVIL;ZESTRIL) 10 MG tablet Take 10 mg by mouth daily Indications: not currently taking       ondansetron (ZOFRAN ODT) 4 MG disintegrating tablet Take 1 tablet by mouth every 12 hours as needed for Nausea, Disp-6 tablet, R-0Print      famotidine (PEPCID) 20 MG tablet Take 1 tablet by mouth 2 times daily, Disp-30 tablet, R-0Print      traMADol (ULTRAM) 50 MG tablet Take 1 tablet by mouth every 6 hours as needed for Pain, Disp-10 tablet, R-0Print      amoxicillin-clavulanate (AUGMENTIN) 875-125 MG per tablet Take 1 tablet by mouth 2 times daily, Disp-13 tablet, R-0Print      metroNIDAZOLE (FLAGYL) 500 MG tablet Take 1 tablet by mouth 2 times daily, Disp-13 tablet, R-0Print      albuterol sulfate HFA (PROVENTIL HFA) 108 (90 BASE) MCG/ACT inhaler Inhale 1-2 puffs into the lungs every 4 hours as needed for Wheezing, Disp-1 Inhaler, R-0      Misc. Devices (SITZ BATH) MISC 3 TIMES DAILY Starting 12/24/2016, Until Discontinued, Disp-1 each, R-1, Print      docusate sodium (COLACE) 100 MG capsule Take 1 capsule by mouth 2 times daily, Disp-30 capsule, R-2      ibuprofen (ADVIL;MOTRIN) 800 MG tablet Take 1 tablet by mouth every 8 hours as needed for Pain, Disp-30 tablet, R-0      aspirin-acetaminophen-caffeine (EXCEDRIN MIGRAINE) 250-250-65 MG per tablet Take 1 tablet by mouth every 6 hours as needed for Pain. acetaminophen (TYLENOL) 500 MG tablet Take 500 mg by mouth every 6 hours as needed for Pain. benzocaine (ORAJEL) 10 % mucosal gel Take  by mouth as needed for Pain. Take by mouth as needed., Mouth/Throat, Historical Med      omeprazole (PRILOSEC) 20 MG capsule Take 20 mg by mouth daily Indications: not currently taking, patient states she has them              ALLERGIES     has No Known Allergies. FAMILY HISTORY     indicated that the status of her mother is unknown. She indicated that the status of her father is unknown. She indicated that the status of her maternal grandmother is unknown. Subdermal 1.5 x 1.0 x 2.4 cm collection of fluid and gas in the medial right   gluteal region.  This may represent a superficial abscess or open wound.       Mild hepatomegaly with features of steatosis. LABS: All lab results were reviewed by myself, and all abnormals are listed below.   Labs Reviewed   ANAEROBIC AND AEROBIC CULTURE - Abnormal; Notable for the following:        Result Value    Direct Exam FEW NEUTROPHILS (*)     Direct Exam MIXED BACTERIAL MORPHOTYPES SEEN ON GRAM STAIN. (*)     Culture ESCHERICHIA COLI SCANT GROWTH (*)     Culture STREPTOCOCCI, BETA HEMOLYTIC GROUP B SCANT GROWTH (*)     All other components within normal limits   CBC WITH AUTO DIFFERENTIAL - Abnormal; Notable for the following:     RBC 3.96 (*)     Hemoglobin 9.4 (*)     Hematocrit 29.5 (*)     MCV 74.5 (*)     MCH 23.8 (*)     RDW 19.5 (*)     Monocytes 8 (*)     All other components within normal limits   BASIC METABOLIC PANEL - Abnormal; Notable for the following:     Glucose 112 (*)     CREATININE 0.48 (*)     Potassium 3.5 (*)     All other components within normal limits   URINE RT REFLEX TO CULTURE - Abnormal; Notable for the following:     Turbidity UA CLOUDY (*)     Urine Hgb SMALL (*)     All other components within normal limits   MICROSCOPIC URINALYSIS - Abnormal; Notable for the following:     Mucus, UA 1+ (*)     All other components within normal limits   POCT URINE PREGNANCY - Normal   LACTIC ACID   POCT HCG, PREGNANCY, UR   POC GLUCOSE FINGERSTICK         EMERGENCY DEPARTMENT COURSE:   Vitals:    Vitals:    07/02/18 1442 07/02/18 1447 07/02/18 1500 07/02/18 1555   BP: 112/80 117/78 121/75 (!) 141/81   Pulse: 74 63 71 81   Resp: 23 14 18 20   Temp: 98.2 °F (36.8 °C)  97.5 °F (36.4 °C)    TempSrc:   Temporal    SpO2: 97% 94% 97%    Weight:       Height:           The patient was given the following medications while in the emergency department:  Orders Placed This Encounter   Medications    ondansetron MEDICATIONS:  Discharge Medication List as of 7/2/2018  3:30 PM      START taking these medications    Details   oxyCODONE-acetaminophen (PERCOCET) 5-325 MG per tablet Take 1 tablet by mouth every 6 hours as needed for Pain for up to 5 days. Intended supply: 7 days.  Take lowest dose possible to manage pain., Disp-20 tablet, R-0Print      doxycycline hyclate (VIBRAMYCIN) 100 MG capsule Take 1 capsule by mouth 2 times daily for 14 days, Disp-28 capsule, R-0Print             (Please note that portions of this note were completed with a voice recognition program.  Efforts were made to edit the dictations but occasionally words are mis-transcribed.)    ANAMIKA Reese PA-C  07/05/18 0864

## 2018-07-06 LAB
CULTURE: ABNORMAL
DIRECT EXAM: ABNORMAL
DIRECT EXAM: ABNORMAL
Lab: ABNORMAL
ORGANISM: ABNORMAL
SPECIMEN DESCRIPTION: ABNORMAL
STATUS: ABNORMAL

## 2018-07-11 ENCOUNTER — HOSPITAL ENCOUNTER (OUTPATIENT)
Dept: WOUND CARE | Age: 43
Discharge: HOME | End: 2018-07-11
Payer: COMMERCIAL

## 2018-07-11 VITALS
BODY MASS INDEX: 53.4 KG/M2 | HEIGHT: 60 IN | RESPIRATION RATE: 18 BRPM | SYSTOLIC BLOOD PRESSURE: 124 MMHG | TEMPERATURE: 97.9 F | HEART RATE: 81 BPM | WEIGHT: 272 LBS | DIASTOLIC BLOOD PRESSURE: 84 MMHG

## 2018-07-11 PROCEDURE — 99213 OFFICE O/P EST LOW 20 MIN: CPT | Performed by: NURSE PRACTITIONER

## 2018-07-11 PROCEDURE — 6370000000 HC RX 637 (ALT 250 FOR IP): Performed by: NURSE PRACTITIONER

## 2018-07-11 PROCEDURE — 99212 OFFICE O/P EST SF 10 MIN: CPT

## 2018-07-11 RX ORDER — LIDOCAINE HYDROCHLORIDE 40 MG/ML
SOLUTION TOPICAL ONCE
Status: COMPLETED | OUTPATIENT
Start: 2018-07-11 | End: 2018-07-11

## 2018-07-11 RX ORDER — IBUPROFEN 800 MG/1
800 TABLET ORAL EVERY 8 HOURS PRN
Qty: 60 TABLET | Refills: 0 | Status: SHIPPED | OUTPATIENT
Start: 2018-07-11 | End: 2019-08-09

## 2018-07-11 RX ADMIN — LIDOCAINE HYDROCHLORIDE 5 ML: 40 SOLUTION TOPICAL at 09:37

## 2018-07-11 ASSESSMENT — PAIN DESCRIPTION - PROGRESSION: CLINICAL_PROGRESSION: NOT CHANGED

## 2018-07-11 ASSESSMENT — PAIN DESCRIPTION - FREQUENCY: FREQUENCY: INTERMITTENT

## 2018-07-11 ASSESSMENT — PAIN DESCRIPTION - ONSET: ONSET: ON-GOING

## 2018-07-11 ASSESSMENT — PAIN SCALES - GENERAL: PAINLEVEL_OUTOF10: 9

## 2018-07-11 ASSESSMENT — PAIN DESCRIPTION - PAIN TYPE: TYPE: ACUTE PAIN

## 2018-07-18 ENCOUNTER — HOSPITAL ENCOUNTER (OUTPATIENT)
Dept: WOUND CARE | Age: 43
Discharge: HOME | End: 2018-07-18
Payer: COMMERCIAL

## 2018-07-18 RX ORDER — LIDOCAINE HYDROCHLORIDE 40 MG/ML
SOLUTION TOPICAL ONCE
Status: DISCONTINUED | OUTPATIENT
Start: 2018-07-18 | End: 2018-07-21 | Stop reason: HOSPADM

## 2019-08-09 ENCOUNTER — OFFICE VISIT (OUTPATIENT)
Dept: OBGYN | Age: 44
End: 2019-08-09
Payer: COMMERCIAL

## 2019-08-09 ENCOUNTER — HOSPITAL ENCOUNTER (OUTPATIENT)
Age: 44
Setting detail: SPECIMEN
Discharge: HOME | End: 2019-08-09
Payer: COMMERCIAL

## 2019-08-09 VITALS
BODY MASS INDEX: 56.79 KG/M2 | DIASTOLIC BLOOD PRESSURE: 80 MMHG | SYSTOLIC BLOOD PRESSURE: 118 MMHG | WEIGHT: 289.25 LBS | HEART RATE: 84 BPM | HEIGHT: 60 IN

## 2019-08-09 DIAGNOSIS — Z20.2 STD EXPOSURE: ICD-10-CM

## 2019-08-09 DIAGNOSIS — Z12.31 ENCOUNTER FOR SCREENING MAMMOGRAM FOR BREAST CANCER: Primary | ICD-10-CM

## 2019-08-09 DIAGNOSIS — N93.9 ABNORMAL UTERINE BLEEDING (AUB): ICD-10-CM

## 2019-08-09 DIAGNOSIS — L73.2 HIDRADENITIS SUPPURATIVA: ICD-10-CM

## 2019-08-09 PROCEDURE — 90651 9VHPV VACCINE 2/3 DOSE IM: CPT | Performed by: OBSTETRICS & GYNECOLOGY

## 2019-08-09 PROCEDURE — 99396 PREV VISIT EST AGE 40-64: CPT | Performed by: STUDENT IN AN ORGANIZED HEALTH CARE EDUCATION/TRAINING PROGRAM

## 2019-08-09 RX ORDER — DOXYCYCLINE HYCLATE 100 MG/1
100 CAPSULE ORAL 2 TIMES DAILY
Qty: 120 CAPSULE | Refills: 0 | Status: SHIPPED | OUTPATIENT
Start: 2019-08-09 | End: 2019-10-08

## 2019-08-09 RX ORDER — CLINDAMYCIN PHOSPHATE 11.9 MG/ML
SOLUTION TOPICAL
Qty: 60 ML | Refills: 3 | Status: SHIPPED | OUTPATIENT
Start: 2019-08-09 | End: 2019-09-08

## 2019-08-09 NOTE — PROGRESS NOTES
12/23/2016    Sleep apnea 12/23/2016     CPAP use at 16      Perirectal abscess 12/23/2016    Controlled type 2 diabetes mellitus without complication, with long-term current use of insulin ([address] 12/23/2016    Iron deficiency anemia 12/23/2016    Dysmenorrhea 12/23/2016    Menorrhagia 12/23/2016    Acute cystitis without hematuria 12/23/2016          Hereditary Breast, Ovarian, Colon and Uterine Cancer screening Done. Tobacco & Secondary smoke risks reviewed; instructed on cessation and avoidance      Counseling Completed:  Preventative Health Recommendations and Follow up. PLAN:  Return in about 2 weeks (around 8/23/2019) for Pre-op Appointment . TSH, CBC, HCG quant collected for AUB. Complete GYN US ordered. Recommend EUA with Hysteroscopy D&C in OR due to body habitus and high suspicion for failed EMB Hscope in office as cervix unable to be visualized on exam today. Patient is a good candidate for IUD placement for AUB and dysmenorrhea as long as endometrial lining is negative for atypia. Patient has referral to [facility] IM office to establish care and get pre-op clearance for proposed surgery of EUA, Hysteroscopy with D&C, Mirena IUD insertion. GCC, Vag probe collected today along with HIV, T pal and Hepatitis panel per patient request.  Gardasil series started today. Will need 2 and 6 month nurse visits to complete series. Doxycycline 100 mg BID x 2 months and Clindamycin 1% solution BID ordered for Hydradinitis Suppurativa today. Repeat Annual every 1 year  Cervical Cytology Evaluation begins at 24years old. If Negative Cytology, Follow-up screening per current guidelines. Mammograms every 1 year. If 35 yo and last mammogram was negative. Calcium and Vitamin D dosing reviewed. Colonoscopy screening reviewed as well as onset for bone density testing. Birth control and barrier recommendations discussed. STD counseling and prevention reviewed.   Gardisil counseling completed for all patients 10-35 yo. Routine health maintenance per patients PCP.   Orders Placed This Encounter   Procedures    Vaginitis DNA Probe    C.trachomatis N.gonorrhoeae DNA    ESTEBAN DIGITAL SCREEN W CAD BILATERAL     Standing Status:   Future     Standing Expiration Date:   8/9/2020     Order Specific Question:   Reason for exam:     Answer:   screening for breast cancer    US Pelvis Complete     Standing Status:   Future     Standing Expiration Date:   8/9/2020     Order Specific Question:   Reason for exam:     Answer:   AUB    US NON OB TRANSVAGINAL     Standing Status:   Future     Standing Expiration Date:   8/9/2020     Order Specific Question:   Reason for exam:     Answer:   AUB    HPV Vaccine 9-valent IM    CBC Auto Differential     Standing Status:   Future     Standing Expiration Date:   8/9/2020    TSH with Reflex     Standing Status:   Future     Standing Expiration Date:   8/8/2020    HCG, Quantitative, Pregnancy     Standing Status:   Future     Standing Expiration Date:   8/8/2020    HIV Screen     Standing Status:   Future     Standing Expiration Date:   8/9/2020    T. pallidum Ab     Standing Status:   Future     Standing Expiration Date:   9/8/2019    Hepatitis Panel, Acute     Standing Status:   Future     Standing Expiration Date:   8/9/2020

## 2019-08-09 NOTE — PROGRESS NOTES
After obtaining consent, and per orders of Dr. Radha Ace, injection of Gardasil 9 given in Right deltoid by Concepción Maurer. Patient instructed to remain in clinic for 20 minutes afterwards, and to report any adverse reaction to me immediately.

## 2019-08-10 LAB
DIRECT EXAM: ABNORMAL
Lab: ABNORMAL
SPECIMEN DESCRIPTION: ABNORMAL

## 2019-08-11 DIAGNOSIS — N76.0 BACTERIAL VAGINOSIS: Primary | ICD-10-CM

## 2019-08-11 DIAGNOSIS — B96.89 BACTERIAL VAGINOSIS: Primary | ICD-10-CM

## 2019-08-11 DIAGNOSIS — A59.9 TRICHOMONOSIS: ICD-10-CM

## 2019-08-11 RX ORDER — METRONIDAZOLE 250 MG/1
500 TABLET ORAL 2 TIMES DAILY
Qty: 28 TABLET | Refills: 0 | Status: SHIPPED | OUTPATIENT
Start: 2019-08-11 | End: 2019-08-12 | Stop reason: SDUPTHER

## 2019-08-12 ENCOUNTER — TELEPHONE (OUTPATIENT)
Dept: OBGYN | Age: 44
End: 2019-08-12

## 2019-08-12 DIAGNOSIS — N76.0 BACTERIAL VAGINOSIS: ICD-10-CM

## 2019-08-12 DIAGNOSIS — A59.9 TRICHOMONOSIS: Primary | ICD-10-CM

## 2019-08-12 DIAGNOSIS — B96.89 BACTERIAL VAGINOSIS: ICD-10-CM

## 2019-08-12 DIAGNOSIS — A74.9 CHLAMYDIA: ICD-10-CM

## 2019-08-12 LAB
C TRACH DNA GENITAL QL NAA+PROBE: ABNORMAL
N. GONORRHOEAE DNA: NEGATIVE
SPECIMEN DESCRIPTION: ABNORMAL

## 2019-08-12 RX ORDER — AZITHROMYCIN 500 MG/1
1000 TABLET, FILM COATED ORAL ONCE
Qty: 2 TABLET | Refills: 0 | Status: SHIPPED | OUTPATIENT
Start: 2019-08-13 | End: 2019-08-13

## 2019-08-12 RX ORDER — METRONIDAZOLE 250 MG/1
500 TABLET ORAL 2 TIMES DAILY
Qty: 28 TABLET | Refills: 0 | Status: SHIPPED | OUTPATIENT
Start: 2019-08-12 | End: 2019-12-23

## 2019-08-13 NOTE — TELEPHONE ENCOUNTER
----- Message from Chemo Milton DO sent at 8/12/2019 11:56 PM EDT -----  Can you please call this patient and tell her that I resent the Flagyl to the pharmacy on [address] and also that the patient is positive for chlamydia and a prescription is also been sent to that pharmacy as well? Her partner will need treatment for chlamydia and both will need test of cure before resuming unprotected intercourse.     Thank you,    Chemo Milton DO  Ob/Gyn Resident PGY-4  8/12/2019, 11:57 PM

## 2019-08-13 NOTE — TELEPHONE ENCOUNTER
Patient was called and informed of abnormal results and med orders. Patient was also informed that an appt for PANDA needs to scheduled. Patient states when she come to appt on 8/23/19 she will schedule PANDA appt.  Patient was informed that partner can receive treatment at any urgent care or [facility] walk-in clinic

## 2019-08-16 ENCOUNTER — HOSPITAL ENCOUNTER (OUTPATIENT)
Age: 44
Discharge: HOME | End: 2019-08-16
Payer: COMMERCIAL

## 2019-08-16 ENCOUNTER — HOSPITAL ENCOUNTER (OUTPATIENT)
Dept: WOMENS IMAGING | Age: 44
Discharge: HOME | End: 2019-08-18
Payer: COMMERCIAL

## 2019-08-16 DIAGNOSIS — N93.9 ABNORMAL UTERINE BLEEDING (AUB): ICD-10-CM

## 2019-08-16 DIAGNOSIS — Z20.2 STD EXPOSURE: ICD-10-CM

## 2019-08-16 DIAGNOSIS — Z12.31 ENCOUNTER FOR SCREENING MAMMOGRAM FOR BREAST CANCER: ICD-10-CM

## 2019-08-16 LAB
ABSOLUTE EOS #: 0.29 K/UL (ref 0–0.4)
ABSOLUTE IMMATURE GRANULOCYTE: ABNORMAL K/UL (ref 0–0.3)
ABSOLUTE LYMPH #: 1.97 K/UL (ref 1–4.8)
ABSOLUTE MONO #: 0.51 K/UL (ref 0.1–1.3)
BASOPHILS # BLD: 1 % (ref 0–2)
BASOPHILS ABSOLUTE: 0.07 K/UL (ref 0–0.2)
DIFFERENTIAL TYPE: ABNORMAL
EOSINOPHILS RELATIVE PERCENT: 4 % (ref 0–4)
HAV IGM SER IA-ACNC: NONREACTIVE
HCG QUANTITATIVE: <1 IU/L
HCT VFR BLD CALC: 29.2 % (ref 36–46)
HEMOGLOBIN: 9 G/DL (ref 12–16)
HEPATITIS B CORE IGM ANTIBODY: NONREACTIVE
HEPATITIS B SURFACE ANTIGEN: NONREACTIVE
HEPATITIS C ANTIBODY: NONREACTIVE
HIV AG/AB: NONREACTIVE
IMMATURE GRANULOCYTES: ABNORMAL %
LYMPHOCYTES # BLD: 27 % (ref 24–44)
MCH RBC QN AUTO: 23.6 PG (ref 26–34)
MCHC RBC AUTO-ENTMCNC: 31 G/DL (ref 31–37)
MCV RBC AUTO: 76.4 FL (ref 80–100)
MONOCYTES # BLD: 7 % (ref 1–7)
MORPHOLOGY: ABNORMAL
NRBC AUTOMATED: ABNORMAL PER 100 WBC
PDW BLD-RTO: 19.5 % (ref 11.5–14.9)
PLATELET # BLD: 282 K/UL (ref 150–450)
PLATELET ESTIMATE: ABNORMAL
PMV BLD AUTO: 8.9 FL (ref 6–12)
RBC # BLD: 3.82 M/UL (ref 4–5.2)
RBC # BLD: ABNORMAL 10*6/UL
SEG NEUTROPHILS: 61 % (ref 36–66)
SEGMENTED NEUTROPHILS ABSOLUTE COUNT: 4.46 K/UL (ref 1.3–9.1)
T. PALLIDUM, IGG: NONREACTIVE
TSH SERPL DL<=0.05 MIU/L-ACNC: 2.27 MIU/L (ref 0.3–5)
WBC # BLD: 7.3 K/UL (ref 3.5–11)
WBC # BLD: ABNORMAL 10*3/UL

## 2019-08-16 PROCEDURE — 76830 TRANSVAGINAL US NON-OB: CPT

## 2019-08-16 PROCEDURE — 87389 HIV-1 AG W/HIV-1&-2 AB AG IA: CPT

## 2019-08-16 PROCEDURE — 84702 CHORIONIC GONADOTROPIN TEST: CPT

## 2019-08-16 PROCEDURE — 77063 BREAST TOMOSYNTHESIS BI: CPT

## 2019-08-16 PROCEDURE — 85025 COMPLETE CBC W/AUTO DIFF WBC: CPT

## 2019-08-16 PROCEDURE — 80074 ACUTE HEPATITIS PANEL: CPT

## 2019-08-16 PROCEDURE — 84443 ASSAY THYROID STIM HORMONE: CPT

## 2019-08-16 PROCEDURE — 86780 TREPONEMA PALLIDUM: CPT

## 2019-08-16 PROCEDURE — 76856 US EXAM PELVIC COMPLETE: CPT

## 2019-08-16 PROCEDURE — 36415 COLL VENOUS BLD VENIPUNCTURE: CPT

## 2019-08-21 ENCOUNTER — TELEPHONE (OUTPATIENT)
Dept: OBGYN | Age: 44
End: 2019-08-21

## 2019-08-21 DIAGNOSIS — D50.8 IRON DEFICIENCY ANEMIA SECONDARY TO INADEQUATE DIETARY IRON INTAKE: Primary | ICD-10-CM

## 2019-08-21 RX ORDER — LANOLIN ALCOHOL/MO/W.PET/CERES
325 CREAM (GRAM) TOPICAL 2 TIMES DAILY
Qty: 90 TABLET | Refills: 3 | Status: SHIPPED | OUTPATIENT
Start: 2019-08-21 | End: 2019-11-14 | Stop reason: ALTCHOICE

## 2019-08-21 NOTE — TELEPHONE ENCOUNTER
Pt has been informed that her CBC results from 8/16/19 show that she has anemia due to inadequate iron intake. Pt states I know \"I have been dealing with anemia for years\".  Pt then asked \"is this iron pill sent to pharmacy going to make my stomach upset because even when I take my iron with food my stomach gets upset\" Pt states she will further discuss this during her appointment this Friday with Dr. Abdelrahman Betancur

## 2019-08-21 NOTE — TELEPHONE ENCOUNTER
----- Message from Marely Burks DO sent at 8/21/2019  2:36 AM EDT -----  Can you please call this patient and tell her she has anemia due to inadequate iron intake and an Iron RX has been sent to her pharmacy on file?     Thank you so much,    Thais Adair, DO

## 2019-08-23 ENCOUNTER — OFFICE VISIT (OUTPATIENT)
Dept: OBGYN | Age: 44
End: 2019-08-23
Payer: COMMERCIAL

## 2019-08-23 VITALS
HEIGHT: 60 IN | SYSTOLIC BLOOD PRESSURE: 103 MMHG | HEART RATE: 90 BPM | WEIGHT: 293 LBS | DIASTOLIC BLOOD PRESSURE: 70 MMHG | BODY MASS INDEX: 57.52 KG/M2

## 2019-08-23 DIAGNOSIS — Z09 FOLLOW UP: ICD-10-CM

## 2019-08-23 DIAGNOSIS — N93.9 ABNORMAL UTERINE BLEEDING (AUB): Primary | ICD-10-CM

## 2019-08-23 PROCEDURE — G8427 DOCREV CUR MEDS BY ELIG CLIN: HCPCS | Performed by: STUDENT IN AN ORGANIZED HEALTH CARE EDUCATION/TRAINING PROGRAM

## 2019-08-23 PROCEDURE — G8417 CALC BMI ABV UP PARAM F/U: HCPCS | Performed by: STUDENT IN AN ORGANIZED HEALTH CARE EDUCATION/TRAINING PROGRAM

## 2019-08-23 PROCEDURE — 99213 OFFICE O/P EST LOW 20 MIN: CPT | Performed by: STUDENT IN AN ORGANIZED HEALTH CARE EDUCATION/TRAINING PROGRAM

## 2019-08-23 PROCEDURE — 4004F PT TOBACCO SCREEN RCVD TLK: CPT | Performed by: STUDENT IN AN ORGANIZED HEALTH CARE EDUCATION/TRAINING PROGRAM

## 2019-08-23 RX ORDER — FLUCONAZOLE 150 MG/1
150 TABLET ORAL ONCE
Qty: 1 TABLET | Refills: 1 | Status: SHIPPED | OUTPATIENT
Start: 2019-08-23 | End: 2019-08-23

## 2019-08-23 NOTE — PROGRESS NOTES
visit. Repeat Annual every 1 year  Cervical Cytology Evaluation begins at 24years old. If Negative Cytology, Follow-up screening per current guidelines. Return to the office in 4 weeks. Counseled on preventative health maintenance follow-up. No orders of the defined types were placed in this encounter. Patient was seen with total face to face time of 15 minutes. More than 50% of this visit was counseling and education regarding The encounter diagnosis was Follow up. and Follow-up   as well as  counseling on preventative health maintenance follow-up.

## 2019-08-27 ENCOUNTER — HOSPITAL ENCOUNTER (EMERGENCY)
Age: 44
Discharge: HOME | End: 2019-08-27
Attending: EMERGENCY MEDICINE
Payer: COMMERCIAL

## 2019-08-27 VITALS
BODY MASS INDEX: 56.74 KG/M2 | RESPIRATION RATE: 17 BRPM | WEIGHT: 289 LBS | HEIGHT: 60 IN | TEMPERATURE: 97.9 F | DIASTOLIC BLOOD PRESSURE: 86 MMHG | HEART RATE: 84 BPM | OXYGEN SATURATION: 100 % | SYSTOLIC BLOOD PRESSURE: 137 MMHG

## 2019-08-27 DIAGNOSIS — J06.9 VIRAL URI: Primary | ICD-10-CM

## 2019-08-27 PROCEDURE — 99283 EMERGENCY DEPT VISIT LOW MDM: CPT

## 2019-08-27 PROCEDURE — 6370000000 HC RX 637 (ALT 250 FOR IP): Performed by: EMERGENCY MEDICINE

## 2019-08-27 RX ORDER — ACETAMINOPHEN 500 MG
1000 TABLET ORAL ONCE
Status: COMPLETED | OUTPATIENT
Start: 2019-08-27 | End: 2019-08-27

## 2019-08-27 RX ORDER — OXYMETAZOLINE HYDROCHLORIDE 0.05 G/100ML
2 SPRAY NASAL 2 TIMES DAILY
Qty: 1 BOTTLE | Refills: 0 | Status: SHIPPED | OUTPATIENT
Start: 2019-08-27 | End: 2019-09-26 | Stop reason: ALTCHOICE

## 2019-08-27 RX ORDER — ACETAMINOPHEN 500 MG
1000 TABLET ORAL EVERY 8 HOURS PRN
Qty: 30 TABLET | Refills: 0 | Status: SHIPPED | OUTPATIENT
Start: 2019-08-27 | End: 2019-11-14 | Stop reason: ALTCHOICE

## 2019-08-27 RX ORDER — OXYMETAZOLINE HYDROCHLORIDE 0.05 G/100ML
1 SPRAY NASAL ONCE
Status: COMPLETED | OUTPATIENT
Start: 2019-08-27 | End: 2019-08-27

## 2019-08-27 RX ORDER — IBUPROFEN 800 MG/1
800 TABLET ORAL ONCE
Status: COMPLETED | OUTPATIENT
Start: 2019-08-27 | End: 2019-08-27

## 2019-08-27 RX ORDER — IBUPROFEN 800 MG/1
800 TABLET ORAL EVERY 8 HOURS PRN
Qty: 30 TABLET | Refills: 0 | Status: SHIPPED | OUTPATIENT
Start: 2019-08-27 | End: 2019-09-26 | Stop reason: ALTCHOICE

## 2019-08-27 RX ORDER — CETIRIZINE HYDROCHLORIDE 10 MG/1
10 TABLET ORAL DAILY
Qty: 30 TABLET | Refills: 0 | Status: SHIPPED | OUTPATIENT
Start: 2019-08-27 | End: 2019-09-26

## 2019-08-27 RX ADMIN — IBUPROFEN 800 MG: 800 TABLET, FILM COATED ORAL at 22:29

## 2019-08-27 RX ADMIN — ACETAMINOPHEN 1000 MG: 500 TABLET ORAL at 22:29

## 2019-08-27 RX ADMIN — Medication 1 SPRAY: at 22:29

## 2019-08-27 ASSESSMENT — ENCOUNTER SYMPTOMS
SINUS PAIN: 1
SORE THROAT: 0
SHORTNESS OF BREATH: 0
ABDOMINAL PAIN: 0
SINUS PRESSURE: 1
TROUBLE SWALLOWING: 0
EYE REDNESS: 0
EYE PAIN: 0
RHINORRHEA: 1

## 2019-08-27 ASSESSMENT — PAIN SCALES - GENERAL
PAINLEVEL_OUTOF10: 3
PAINLEVEL_OUTOF10: 2

## 2019-08-27 ASSESSMENT — PAIN DESCRIPTION - LOCATION: LOCATION: FACE

## 2019-08-28 NOTE — ED PROVIDER NOTES
Used   Substance and Sexual Activity    Alcohol use: Yes     Comment: Occasionally    Drug use: Yes     Types: Marijuana     Comment: every other day    Sexual activity: Yes     Partners: Male     Comment: 1 partner boyfriend   Lifestyle    Physical activity:     Days per week: Not on file     Minutes per session: Not on file    Stress: Not on file   Relationships    Social connections:     Talks on phone: Not on file     Gets together: Not on file     Attends Worship service: Not on file     Active member of club or organization: Not on file     Attends meetings of clubs or organizations: Not on file     Relationship status: Not on file    Intimate partner violence:     Fear of current or ex partner: Not on file     Emotionally abused: Not on file     Physically abused: Not on file     Forced sexual activity: Not on file   Other Topics Concern    Not on file   Social History Narrative    Lives with 4 kids age 22yo to 32year old       Family History   Problem Relation Age of Onset    Vision Loss Mother     Diabetes Father     High Blood Pressure Father     Other Father     Cancer Maternal Grandmother         renal       Allergies:  Patient has no known allergies. Home Medications:  Prior to Admission medications    Medication Sig Start Date End Date Taking?  Authorizing Provider   acetaminophen (TYLENOL) 500 MG tablet Take 2 tablets by mouth every 8 hours as needed for Pain 8/27/19  Yes Ruby Smith DO   ibuprofen (ADVIL;MOTRIN) 800 MG tablet Take 1 tablet by mouth every 8 hours as needed for Pain 8/27/19  Yes Ruby Smith DO   oxymetazoline (AFRIN 12 HOUR) 0.05 % nasal spray 2 sprays by Nasal route 2 times daily For a maximum of three days 8/27/19 9/26/19 Yes Ruby Smith DO   cetirizine (ZYRTEC) 10 MG tablet Take 1 tablet by mouth daily 8/27/19 9/26/19 Yes Ruby Smith DO   ferrous sulfate (FE TABS) 325 (65 Fe) MG EC tablet Take 1 tablet by mouth 2 times daily 8/21/19   Tomas Middleton Cydney Poster, DO   doxycycline hyclate (VIBRAMYCIN) 100 MG capsule Take 1 capsule by mouth 2 times daily 8/9/19 10/8/19  Zoraida Greenville, DO   clindamycin (CLEOCIN-T) 1 % external solution Apply topically 2 times daily. 8/9/19 9/8/19  Zoraida [place]ville, DO       REVIEW OF SYSTEMS    (2-9 systems for level 4, 10 or more for level 5)      Review of Systems   Constitutional: Positive for fever (subjective). HENT: Positive for congestion, rhinorrhea, sinus pressure and sinus pain. Negative for sore throat and trouble swallowing. Eyes: Negative for pain and redness. Respiratory: Negative for shortness of breath. Cardiovascular: Negative for chest pain. Gastrointestinal: Negative for abdominal pain. PHYSICAL EXAM   (up to 7 for level 4, 8 or more for level 5)      INITIAL VITALS:   /86   Pulse 84   Temp 97.9 °F (36.6 °C) (Oral)   Resp 17   Ht 5' (1.524 m)   Wt 289 lb (131.1 kg)   LMP 08/21/2019 (Exact Date)   SpO2 100%   BMI 56.44 kg/m²     Physical Exam   Constitutional: She is oriented to person, place, and time. She appears well-developed and well-nourished. No distress. HENT:   Head: Normocephalic and atraumatic. Right Ear: Tympanic membrane and ear canal normal.   Left Ear: Tympanic membrane and ear canal normal.   Nose: Mucosal edema and rhinorrhea present. Mouth/Throat: Uvula is midline, oropharynx is clear and moist and mucous membranes are normal. No oropharyngeal exudate, posterior oropharyngeal edema, posterior oropharyngeal erythema or tonsillar abscesses. Tonsils are 0 on the right. Tonsils are 0 on the left. No tonsillar exudate. Eyes: Conjunctivae and EOM are normal. Right eye exhibits no discharge. Left eye exhibits no discharge. Patient's left lower eyelid appears very slightly larger compared to her right without pain with palpation, erythema, induration, or crepitance. No drainage from the eye. No proptosis   Neck: No tracheal deviation present. Cardiovascular: Normal rate, regular rhythm and normal heart sounds. Pulmonary/Chest: Effort normal and breath sounds normal. No stridor. No respiratory distress. She has no wheezes. She has no rales. Musculoskeletal: Normal range of motion. She exhibits no deformity. Neurological: She is alert and oriented to person, place, and time. Skin: Skin is warm and dry. No rash (on exposed skin) noted. She is not diaphoretic. Psychiatric: She has a normal mood and affect. Her behavior is normal.   Nursing note and vitals reviewed. WORK-UP     PLAN (LABS / IMAGING /EKG):  No orders of the defined types were placed in this encounter. MEDICATIONS ORDERED:  Orders Placed This Encounter   Medications    oxymetazoline (AFRIN) 0.05 % nasal spray 1 spray    acetaminophen (TYLENOL) tablet 1,000 mg    ibuprofen (ADVIL;MOTRIN) tablet 800 mg    acetaminophen (TYLENOL) 500 MG tablet     Sig: Take 2 tablets by mouth every 8 hours as needed for Pain     Dispense:  30 tablet     Refill:  0    ibuprofen (ADVIL;MOTRIN) 800 MG tablet     Sig: Take 1 tablet by mouth every 8 hours as needed for Pain     Dispense:  30 tablet     Refill:  0    oxymetazoline (AFRIN 12 HOUR) 0.05 % nasal spray     Si sprays by Nasal route 2 times daily For a maximum of three days     Dispense:  1 Bottle     Refill:  0    cetirizine (ZYRTEC) 10 MG tablet     Sig: Take 1 tablet by mouth daily     Dispense:  30 tablet     Refill:  0       DIAGNOSTIC RESULTS / EMERGENCY DEPARTMENT COURSE /MDM / DIFFERENTIAL DIAGNOSIS     LABS:  No results found for this visit on 19.     RADIOLOGY:  None    EKG  None    All EKG's are interpreted by the Emergency Department Physician who either signs or Co-signs this chart in the absence of a cardiologist.    DIFFERENTIAL DIAGNOSIS:  Viral URI, conjunctivitis, dacrocystitis, otitis media, preseptal cellulitis    EMERGENCY DEPARTMENT COURSE & MDM:  41 y.o. female presents with a chief complaint of mouth every 8 hours as needed for Pain    OXYMETAZOLINE (AFRIN 12 HOUR) 0.05 % NASAL SPRAY    2 sprays by Nasal route 2 times daily For a maximum of three days       Magi Roberts DO  Emergency Medicine Resident    (Please note that portions ofthis note were completed with a voice recognition program.  Efforts were made to edit the dictations but occasionally words are mis-transcribed.)        Magi Roberts DO  Resident  08/27/19 [id number]

## 2019-09-09 ENCOUNTER — OFFICE VISIT (OUTPATIENT)
Dept: INTERNAL MEDICINE | Age: 44
End: 2019-09-09
Payer: COMMERCIAL

## 2019-09-09 VITALS
SYSTOLIC BLOOD PRESSURE: 110 MMHG | HEART RATE: 79 BPM | WEIGHT: 293 LBS | DIASTOLIC BLOOD PRESSURE: 68 MMHG | HEIGHT: 60 IN | BODY MASS INDEX: 57.52 KG/M2

## 2019-09-09 DIAGNOSIS — Z13.1 SCREENING FOR DIABETES MELLITUS: ICD-10-CM

## 2019-09-09 DIAGNOSIS — Z23 NEED FOR PROPHYLACTIC VACCINATION AGAINST DIPHTHERIA-TETANUS-PERTUSSIS (DTP): ICD-10-CM

## 2019-09-09 DIAGNOSIS — L73.2 HIDRADENITIS SUPPURATIVA: Primary | ICD-10-CM

## 2019-09-09 DIAGNOSIS — Z13.220 SCREENING FOR HYPERLIPIDEMIA: ICD-10-CM

## 2019-09-09 PROCEDURE — G8427 DOCREV CUR MEDS BY ELIG CLIN: HCPCS | Performed by: NURSE PRACTITIONER

## 2019-09-09 PROCEDURE — 4004F PT TOBACCO SCREEN RCVD TLK: CPT | Performed by: NURSE PRACTITIONER

## 2019-09-09 PROCEDURE — G8417 CALC BMI ABV UP PARAM F/U: HCPCS | Performed by: NURSE PRACTITIONER

## 2019-09-09 PROCEDURE — 99211 OFF/OP EST MAY X REQ PHY/QHP: CPT | Performed by: NURSE PRACTITIONER

## 2019-09-09 PROCEDURE — 99213 OFFICE O/P EST LOW 20 MIN: CPT | Performed by: NURSE PRACTITIONER

## 2019-09-09 ASSESSMENT — PATIENT HEALTH QUESTIONNAIRE - PHQ9
1. LITTLE INTEREST OR PLEASURE IN DOING THINGS: 0
2. FEELING DOWN, DEPRESSED OR HOPELESS: 0
SUM OF ALL RESPONSES TO PHQ9 QUESTIONS 1 & 2: 0
SUM OF ALL RESPONSES TO PHQ QUESTIONS 1-9: 0
SUM OF ALL RESPONSES TO PHQ QUESTIONS 1-9: 0

## 2019-09-11 ENCOUNTER — HOSPITAL ENCOUNTER (OUTPATIENT)
Age: 44
Discharge: HOME | End: 2019-09-11
Payer: COMMERCIAL

## 2019-09-11 DIAGNOSIS — Z13.1 SCREENING FOR DIABETES MELLITUS: ICD-10-CM

## 2019-09-11 DIAGNOSIS — Z13.220 SCREENING FOR HYPERLIPIDEMIA: ICD-10-CM

## 2019-09-11 LAB
CHOLESTEROL, FASTING: 146 MG/DL
CHOLESTEROL/HDL RATIO: 2.9
ESTIMATED AVERAGE GLUCOSE: 131 MG/DL
HBA1C MFR BLD: 6.2 % (ref 4–6)
HDLC SERPL-MCNC: 51 MG/DL
LDL CHOLESTEROL: 81 MG/DL (ref 0–130)
TRIGLYCERIDE, FASTING: 70 MG/DL
VLDLC SERPL CALC-MCNC: NORMAL MG/DL (ref 1–30)

## 2019-09-11 PROCEDURE — 83036 HEMOGLOBIN GLYCOSYLATED A1C: CPT

## 2019-09-11 PROCEDURE — 80061 LIPID PANEL: CPT

## 2019-09-11 PROCEDURE — 36415 COLL VENOUS BLD VENIPUNCTURE: CPT

## 2019-09-26 ENCOUNTER — OFFICE VISIT (OUTPATIENT)
Dept: OBGYN | Age: 44
End: 2019-09-26
Payer: COMMERCIAL

## 2019-09-26 VITALS
WEIGHT: 292 LBS | HEART RATE: 71 BPM | BODY MASS INDEX: 57.33 KG/M2 | SYSTOLIC BLOOD PRESSURE: 111 MMHG | DIASTOLIC BLOOD PRESSURE: 67 MMHG | HEIGHT: 60 IN

## 2019-09-26 DIAGNOSIS — N93.9 ABNORMAL UTERINE BLEEDING (AUB): Primary | ICD-10-CM

## 2019-09-26 PROCEDURE — G8417 CALC BMI ABV UP PARAM F/U: HCPCS | Performed by: STUDENT IN AN ORGANIZED HEALTH CARE EDUCATION/TRAINING PROGRAM

## 2019-09-26 PROCEDURE — 4004F PT TOBACCO SCREEN RCVD TLK: CPT | Performed by: STUDENT IN AN ORGANIZED HEALTH CARE EDUCATION/TRAINING PROGRAM

## 2019-09-26 PROCEDURE — 99213 OFFICE O/P EST LOW 20 MIN: CPT | Performed by: STUDENT IN AN ORGANIZED HEALTH CARE EDUCATION/TRAINING PROGRAM

## 2019-09-26 PROCEDURE — G8427 DOCREV CUR MEDS BY ELIG CLIN: HCPCS | Performed by: STUDENT IN AN ORGANIZED HEALTH CARE EDUCATION/TRAINING PROGRAM

## 2019-09-26 RX ORDER — CLINDAMYCIN PHOSPHATE 10 UG/ML
LOTION TOPICAL
Refills: 0 | COMMUNITY
Start: 2019-09-24 | End: 2019-11-14 | Stop reason: ALTCHOICE

## 2019-09-26 NOTE — PROGRESS NOTES
# Outcome Date GA Lbr Mckinley/2nd Weight Sex Delivery Anes PTL Lv   5 Term 1997     Vag-Spont   ARNOLDO   4 SAB 1996           3 Term 18    F Vag-Spont   ARNOLDO   2 TAB 1991           1A Term 1990     Vag-Spont   ARNOLDO   1B Term 1990     Vag-Spont   ARNOLDO     Past Medical History:   Diagnosis Date    Diabetes mellitus ([facility]     GERD (gastroesophageal reflux disease)     Hidradenitis suppurativa     Hyperlipidemia     Hypertension     Obesity, morbid, BMI 50 or higher ([facility]     Sleep apnea     CPAP use at 16       Past Surgical History:   Procedure Laterality Date    ABSCESS DRAINAGE      ABSCESS DRAINAGE  12/23/2016    BUTTOCK    [place] RECTUM SURGERY PROCEDURE UNLISTED N/A 7/2/2018    RECTAL PERIRECTAL INCISION AND DRAINAGE performed by Aren Allen DO at [address]         Social History     Socioeconomic History    Marital status: Single     Spouse name: Not on file    Number of children: 4    Years of education: 8th grade    Highest education level: Not on file   Occupational History    Occupation: unemployed     Comment: working on [employer]   Social Needs    Financial resource strain: Not on file    Food insecurity:     Worry: Not on file     Inability: Not on file   [employer] needs:     Medical: Not on file     Non-medical: Not on file   Tobacco Use    Smoking status: Current Every Day Smoker     Packs/day: 0.50     Start date: 12/23/1982    Smokeless tobacco: Never Used   Substance and Sexual Activity    Alcohol use: Not Currently     Comment: Occasionally    Drug use: Not Currently     Types: Marijuana     Comment: every other day    Sexual activity: Yes     Partners: Male     Comment: 1 partner boyfriend   Lifestyle    Physical activity:     Days per week: Not on file     Minutes per session: Not on file    Stress: Not on file   Relationships    Social connections:     Talks on phone: Not on file     Gets together: Not on file     Attends [employer] service: Not on file forwarded to the pre-operative holding area. The patient is aware that this procedure may not alleviate her symptoms. That there may be a necessity for a second surgery and that there may be an incomplete removal of abnormal tissue. Patient was seen with total face to face time of 15 minutes. More than 50% of this visit was on counseling and education regarding her    Diagnosis Orders   1. Abnormal uterine bleeding (AUB)      and her options. She was also counseled on her preventative health maintenance recommendations and follow-up.

## 2019-09-30 ENCOUNTER — TELEPHONE (OUTPATIENT)
Dept: OBGYN | Age: 44
End: 2019-09-30

## 2019-10-14 ENCOUNTER — OFFICE VISIT (OUTPATIENT)
Dept: INTERNAL MEDICINE | Age: 44
End: 2019-10-14
Payer: COMMERCIAL

## 2019-10-14 VITALS
DIASTOLIC BLOOD PRESSURE: 77 MMHG | WEIGHT: 290 LBS | HEIGHT: 60 IN | BODY MASS INDEX: 56.93 KG/M2 | HEART RATE: 77 BPM | SYSTOLIC BLOOD PRESSURE: 130 MMHG

## 2019-10-14 DIAGNOSIS — M54.50 CHRONIC BILATERAL LOW BACK PAIN WITHOUT SCIATICA: Primary | ICD-10-CM

## 2019-10-14 DIAGNOSIS — G89.29 CHRONIC BILATERAL LOW BACK PAIN WITHOUT SCIATICA: Primary | ICD-10-CM

## 2019-10-14 DIAGNOSIS — N93.8 DUB (DYSFUNCTIONAL UTERINE BLEEDING): ICD-10-CM

## 2019-10-14 PROCEDURE — G8427 DOCREV CUR MEDS BY ELIG CLIN: HCPCS | Performed by: NURSE PRACTITIONER

## 2019-10-14 PROCEDURE — G8484 FLU IMMUNIZE NO ADMIN: HCPCS | Performed by: NURSE PRACTITIONER

## 2019-10-14 PROCEDURE — 4004F PT TOBACCO SCREEN RCVD TLK: CPT | Performed by: NURSE PRACTITIONER

## 2019-10-14 PROCEDURE — G8417 CALC BMI ABV UP PARAM F/U: HCPCS | Performed by: NURSE PRACTITIONER

## 2019-10-14 PROCEDURE — 99213 OFFICE O/P EST LOW 20 MIN: CPT | Performed by: NURSE PRACTITIONER

## 2019-10-17 ENCOUNTER — TELEPHONE (OUTPATIENT)
Dept: INTERNAL MEDICINE | Age: 44
End: 2019-10-17

## 2019-10-22 ENCOUNTER — HOSPITAL ENCOUNTER (OUTPATIENT)
Dept: PHYSICAL THERAPY | Age: 44
Setting detail: THERAPIES SERIES
Discharge: HOME | End: 2019-10-22
Payer: COMMERCIAL

## 2019-11-14 ENCOUNTER — HOSPITAL ENCOUNTER (OUTPATIENT)
Dept: PREADMISSION TESTING | Age: 44
Discharge: HOME | End: 2019-11-18
Payer: COMMERCIAL

## 2019-11-14 VITALS
SYSTOLIC BLOOD PRESSURE: 125 MMHG | RESPIRATION RATE: 18 BRPM | OXYGEN SATURATION: 100 % | BODY MASS INDEX: 56.54 KG/M2 | WEIGHT: 288 LBS | TEMPERATURE: 97.7 F | HEART RATE: 58 BPM | HEIGHT: 60 IN | DIASTOLIC BLOOD PRESSURE: 76 MMHG

## 2019-11-14 LAB
ABO/RH: NORMAL
ALP BLD-CCNC: 57 U/L (ref 35–104)
ALT SERPL-CCNC: 15 U/L (ref 5–33)
ANION GAP SERPL CALCULATED.3IONS-SCNC: 11 MMOL/L (ref 9–17)
ANTIBODY SCREEN: NEGATIVE
ARM BAND NUMBER: NORMAL
BUN BLDV-MCNC: 7 MG/DL (ref 6–20)
CHLORIDE BLD-SCNC: 107 MMOL/L (ref 98–107)
CO2: 24 MMOL/L (ref 20–31)
CREAT SERPL-MCNC: 0.44 MG/DL (ref 0.5–0.9)
EXPIRATION DATE: NORMAL
GFR AFRICAN AMERICAN: >60 ML/MIN
GFR NON-AFRICAN AMERICAN: >60 ML/MIN
GFR SERPL CREATININE-BSD FRML MDRD: ABNORMAL ML/MIN/{1.73_M2}
GFR SERPL CREATININE-BSD FRML MDRD: ABNORMAL ML/MIN/{1.73_M2}
GLUCOSE BLD-MCNC: 95 MG/DL (ref 70–99)
HCT VFR BLD CALC: 29.6 % (ref 36.3–47.1)
HEMOGLOBIN: 8.6 G/DL (ref 11.9–15.1)
MCH RBC QN AUTO: 23.2 PG (ref 25.2–33.5)
MCHC RBC AUTO-ENTMCNC: 29.1 G/DL (ref 28.4–34.8)
MCV RBC AUTO: 80 FL (ref 82.6–102.9)
NRBC AUTOMATED: 0 PER 100 WBC
PDW BLD-RTO: 19.6 % (ref 11.8–14.4)
PLATELET # BLD: 283 K/UL (ref 138–453)
PMV BLD AUTO: 12.1 FL (ref 8.1–13.5)
POTASSIUM SERPL-SCNC: 3.5 MMOL/L (ref 3.7–5.3)
RBC # BLD: 3.7 M/UL (ref 3.95–5.11)
SODIUM BLD-SCNC: 142 MMOL/L (ref 135–144)
WBC # BLD: 5.9 K/UL (ref 3.5–11.3)

## 2019-11-14 PROCEDURE — 84520 ASSAY OF UREA NITROGEN: CPT

## 2019-11-14 PROCEDURE — 86850 RBC ANTIBODY SCREEN: CPT

## 2019-11-14 PROCEDURE — 36415 COLL VENOUS BLD VENIPUNCTURE: CPT

## 2019-11-14 PROCEDURE — 84075 ASSAY ALKALINE PHOSPHATASE: CPT

## 2019-11-14 PROCEDURE — 84460 ALANINE AMINO (ALT) (SGPT): CPT

## 2019-11-14 PROCEDURE — 86900 BLOOD TYPING SEROLOGIC ABO: CPT

## 2019-11-14 PROCEDURE — 80051 ELECTROLYTE PANEL: CPT

## 2019-11-14 PROCEDURE — 85027 COMPLETE CBC AUTOMATED: CPT

## 2019-11-14 PROCEDURE — 82947 ASSAY GLUCOSE BLOOD QUANT: CPT

## 2019-11-14 PROCEDURE — 86901 BLOOD TYPING SEROLOGIC RH(D): CPT

## 2019-11-14 PROCEDURE — 93005 ELECTROCARDIOGRAM TRACING: CPT | Performed by: ANESTHESIOLOGY

## 2019-11-14 PROCEDURE — 82565 ASSAY OF CREATININE: CPT

## 2019-11-14 RX ORDER — SODIUM CHLORIDE, SODIUM LACTATE, POTASSIUM CHLORIDE, CALCIUM CHLORIDE 600; 310; 30; 20 MG/100ML; MG/100ML; MG/100ML; MG/100ML
1000 INJECTION, SOLUTION INTRAVENOUS CONTINUOUS
Status: CANCELLED | OUTPATIENT
Start: 2019-11-14

## 2019-11-16 LAB
EKG ATRIAL RATE: 53 BPM
EKG P AXIS: 51 DEGREES
EKG P-R INTERVAL: 140 MS
EKG Q-T INTERVAL: 426 MS
EKG QRS DURATION: 88 MS
EKG QTC CALCULATION (BAZETT): 399 MS
EKG R AXIS: 27 DEGREES
EKG T AXIS: 1 DEGREES
EKG VENTRICULAR RATE: 53 BPM

## 2019-11-16 PROCEDURE — 93010 ELECTROCARDIOGRAM REPORT: CPT | Performed by: INTERNAL MEDICINE

## 2019-11-18 ENCOUNTER — HOSPITAL ENCOUNTER (OUTPATIENT)
Age: 44
Setting detail: OUTPATIENT SURGERY
Discharge: HOME | End: 2019-11-18
Attending: OBSTETRICS & GYNECOLOGY | Admitting: OBSTETRICS & GYNECOLOGY
Payer: COMMERCIAL

## 2019-11-18 ENCOUNTER — ANESTHESIA (OUTPATIENT)
Dept: OPERATING ROOM | Age: 44
End: 2019-11-18
Payer: COMMERCIAL

## 2019-11-18 ENCOUNTER — ANESTHESIA EVENT (OUTPATIENT)
Dept: OPERATING ROOM | Age: 44
End: 2019-11-18
Payer: COMMERCIAL

## 2019-11-18 VITALS
HEART RATE: 71 BPM | HEIGHT: 60 IN | TEMPERATURE: 96.8 F | OXYGEN SATURATION: 99 % | SYSTOLIC BLOOD PRESSURE: 133 MMHG | DIASTOLIC BLOOD PRESSURE: 87 MMHG | WEIGHT: 288 LBS | RESPIRATION RATE: 16 BRPM | BODY MASS INDEX: 56.54 KG/M2

## 2019-11-18 VITALS — TEMPERATURE: 96.2 F | DIASTOLIC BLOOD PRESSURE: 85 MMHG | OXYGEN SATURATION: 100 % | SYSTOLIC BLOOD PRESSURE: 134 MMHG

## 2019-11-18 PROBLEM — Z98.890 POSTOPERATIVE STATE: Status: ACTIVE | Noted: 2019-11-18

## 2019-11-18 LAB
GFR NON-AFRICAN AMERICAN: >60 ML/MIN
GFR SERPL CREATININE-BSD FRML MDRD: >60 ML/MIN
GFR SERPL CREATININE-BSD FRML MDRD: ABNORMAL ML/MIN/{1.73_M2}
GLUCOSE BLD-MCNC: 70 MG/DL (ref 65–105)
GLUCOSE BLD-MCNC: 90 MG/DL (ref 74–100)
GLUCOSE BLD-MCNC: 93 MG/DL (ref 65–105)
HCG, PREGNANCY URINE (POC): NEGATIVE
POC CHLORIDE: 108 MMOL/L (ref 98–107)
POC CREATININE: 0.47 MG/DL (ref 0.51–1.19)
POC HEMATOCRIT: 32 % (ref 36–46)
POC HEMOGLOBIN: 11 G/DL (ref 12–16)
POC POTASSIUM: 4.9 MMOL/L (ref 3.5–4.5)
POC SODIUM: 142 MMOL/L (ref 138–146)

## 2019-11-18 PROCEDURE — 7100000001 HC PACU RECOVERY - ADDTL 15 MIN: Performed by: OBSTETRICS & GYNECOLOGY

## 2019-11-18 PROCEDURE — 58558 HYSTEROSCOPY BIOPSY: CPT | Performed by: OBSTETRICS & GYNECOLOGY

## 2019-11-18 PROCEDURE — 81025 URINE PREGNANCY TEST: CPT

## 2019-11-18 PROCEDURE — 6360000002 HC RX W HCPCS: Performed by: OBSTETRICS & GYNECOLOGY

## 2019-11-18 PROCEDURE — 88305 TISSUE EXAM BY PATHOLOGIST: CPT

## 2019-11-18 PROCEDURE — 2580000003 HC RX 258: Performed by: ANESTHESIOLOGY

## 2019-11-18 PROCEDURE — 84132 ASSAY OF SERUM POTASSIUM: CPT

## 2019-11-18 PROCEDURE — 7100000000 HC PACU RECOVERY - FIRST 15 MIN: Performed by: OBSTETRICS & GYNECOLOGY

## 2019-11-18 PROCEDURE — 2500000003 HC RX 250 WO HCPCS: Performed by: NURSE ANESTHETIST, CERTIFIED REGISTERED

## 2019-11-18 PROCEDURE — 84295 ASSAY OF SERUM SODIUM: CPT

## 2019-11-18 PROCEDURE — 85014 HEMATOCRIT: CPT

## 2019-11-18 PROCEDURE — 82565 ASSAY OF CREATININE: CPT

## 2019-11-18 PROCEDURE — 2580000003 HC RX 258: Performed by: OBSTETRICS & GYNECOLOGY

## 2019-11-18 PROCEDURE — 3600000014 HC SURGERY LEVEL 4 ADDTL 15MIN: Performed by: OBSTETRICS & GYNECOLOGY

## 2019-11-18 PROCEDURE — 3600000004 HC SURGERY LEVEL 4 BASE: Performed by: OBSTETRICS & GYNECOLOGY

## 2019-11-18 PROCEDURE — 3700000001 HC ADD 15 MINUTES (ANESTHESIA): Performed by: OBSTETRICS & GYNECOLOGY

## 2019-11-18 PROCEDURE — 7100000010 HC PHASE II RECOVERY - FIRST 15 MIN: Performed by: OBSTETRICS & GYNECOLOGY

## 2019-11-18 PROCEDURE — 2709999900 HC NON-CHARGEABLE SUPPLY: Performed by: OBSTETRICS & GYNECOLOGY

## 2019-11-18 PROCEDURE — 2720000010 HC SURG SUPPLY STERILE: Performed by: OBSTETRICS & GYNECOLOGY

## 2019-11-18 PROCEDURE — 2580000003 HC RX 258: Performed by: NURSE ANESTHETIST, CERTIFIED REGISTERED

## 2019-11-18 PROCEDURE — 82435 ASSAY OF BLOOD CHLORIDE: CPT

## 2019-11-18 PROCEDURE — 7100000011 HC PHASE II RECOVERY - ADDTL 15 MIN: Performed by: OBSTETRICS & GYNECOLOGY

## 2019-11-18 PROCEDURE — 82947 ASSAY GLUCOSE BLOOD QUANT: CPT

## 2019-11-18 PROCEDURE — 6360000002 HC RX W HCPCS: Performed by: NURSE ANESTHETIST, CERTIFIED REGISTERED

## 2019-11-18 PROCEDURE — 3700000000 HC ANESTHESIA ATTENDED CARE: Performed by: OBSTETRICS & GYNECOLOGY

## 2019-11-18 DEVICE — DEVICE INTRAUTERNE CONTRACEPTIVE MIRENA
Type: IMPLANTABLE DEVICE | Status: NON-FUNCTIONAL
Removed: 2021-07-26

## 2019-11-18 RX ORDER — PROPOFOL 10 MG/ML
INJECTION, EMULSION INTRAVENOUS PRN
Status: DISCONTINUED | OUTPATIENT
Start: 2019-11-18 | End: 2019-11-18 | Stop reason: SDUPTHER

## 2019-11-18 RX ORDER — MAGNESIUM HYDROXIDE 1200 MG/15ML
LIQUID ORAL CONTINUOUS PRN
Status: COMPLETED | OUTPATIENT
Start: 2019-11-18 | End: 2019-11-18

## 2019-11-18 RX ORDER — SODIUM CHLORIDE, SODIUM LACTATE, POTASSIUM CHLORIDE, CALCIUM CHLORIDE 600; 310; 30; 20 MG/100ML; MG/100ML; MG/100ML; MG/100ML
INJECTION, SOLUTION INTRAVENOUS CONTINUOUS PRN
Status: DISCONTINUED | OUTPATIENT
Start: 2019-11-18 | End: 2019-11-18 | Stop reason: SDUPTHER

## 2019-11-18 RX ORDER — DEXAMETHASONE SODIUM PHOSPHATE 10 MG/ML
INJECTION INTRAMUSCULAR; INTRAVENOUS PRN
Status: DISCONTINUED | OUTPATIENT
Start: 2019-11-18 | End: 2019-11-18 | Stop reason: SDUPTHER

## 2019-11-18 RX ORDER — MIDAZOLAM HYDROCHLORIDE 1 MG/ML
INJECTION INTRAMUSCULAR; INTRAVENOUS PRN
Status: DISCONTINUED | OUTPATIENT
Start: 2019-11-18 | End: 2019-11-18 | Stop reason: SDUPTHER

## 2019-11-18 RX ORDER — GLYCOPYRROLATE 1 MG/5 ML
SYRINGE (ML) INTRAVENOUS PRN
Status: DISCONTINUED | OUTPATIENT
Start: 2019-11-18 | End: 2019-11-18 | Stop reason: SDUPTHER

## 2019-11-18 RX ORDER — SODIUM CHLORIDE, SODIUM LACTATE, POTASSIUM CHLORIDE, CALCIUM CHLORIDE 600; 310; 30; 20 MG/100ML; MG/100ML; MG/100ML; MG/100ML
1000 INJECTION, SOLUTION INTRAVENOUS CONTINUOUS
Status: DISCONTINUED | OUTPATIENT
Start: 2019-11-18 | End: 2019-11-18 | Stop reason: HOSPADM

## 2019-11-18 RX ORDER — FENTANYL CITRATE 50 UG/ML
INJECTION, SOLUTION INTRAMUSCULAR; INTRAVENOUS PRN
Status: DISCONTINUED | OUTPATIENT
Start: 2019-11-18 | End: 2019-11-18 | Stop reason: SDUPTHER

## 2019-11-18 RX ORDER — LIDOCAINE HYDROCHLORIDE 10 MG/ML
INJECTION, SOLUTION EPIDURAL; INFILTRATION; INTRACAUDAL; PERINEURAL PRN
Status: DISCONTINUED | OUTPATIENT
Start: 2019-11-18 | End: 2019-11-18 | Stop reason: SDUPTHER

## 2019-11-18 RX ORDER — IBUPROFEN 800 MG/1
800 TABLET ORAL EVERY 8 HOURS PRN
Qty: 30 TABLET | Refills: 0 | Status: SHIPPED | OUTPATIENT
Start: 2019-11-18 | End: 2019-12-02 | Stop reason: SDUPTHER

## 2019-11-18 RX ORDER — KETOROLAC TROMETHAMINE 30 MG/ML
INJECTION, SOLUTION INTRAMUSCULAR; INTRAVENOUS PRN
Status: DISCONTINUED | OUTPATIENT
Start: 2019-11-18 | End: 2019-11-18 | Stop reason: SDUPTHER

## 2019-11-18 RX ORDER — PHENYLEPHRINE HYDROCHLORIDE 10 MG/ML
INJECTION INTRAVENOUS PRN
Status: DISCONTINUED | OUTPATIENT
Start: 2019-11-18 | End: 2019-11-18 | Stop reason: SDUPTHER

## 2019-11-18 RX ORDER — ONDANSETRON 2 MG/ML
INJECTION INTRAMUSCULAR; INTRAVENOUS PRN
Status: DISCONTINUED | OUTPATIENT
Start: 2019-11-18 | End: 2019-11-18 | Stop reason: SDUPTHER

## 2019-11-18 RX ADMIN — DEXAMETHASONE SODIUM PHOSPHATE 10 MG: 10 INJECTION INTRAMUSCULAR; INTRAVENOUS at 14:11

## 2019-11-18 RX ADMIN — FENTANYL CITRATE 25 MCG: 50 INJECTION INTRAMUSCULAR; INTRAVENOUS at 14:08

## 2019-11-18 RX ADMIN — PHENYLEPHRINE HYDROCHLORIDE 200 MCG: 10 INJECTION INTRAVENOUS at 14:28

## 2019-11-18 RX ADMIN — SODIUM CHLORIDE, POTASSIUM CHLORIDE, SODIUM LACTATE AND CALCIUM CHLORIDE 1000 ML: 600; 310; 30; 20 INJECTION, SOLUTION INTRAVENOUS at 12:18

## 2019-11-18 RX ADMIN — ONDANSETRON 4 MG: 2 INJECTION, SOLUTION INTRAMUSCULAR; INTRAVENOUS at 14:26

## 2019-11-18 RX ADMIN — PROPOFOL 500 MG: 10 INJECTION, EMULSION INTRAVENOUS at 13:59

## 2019-11-18 RX ADMIN — FENTANYL CITRATE 25 MCG: 50 INJECTION INTRAMUSCULAR; INTRAVENOUS at 14:04

## 2019-11-18 RX ADMIN — SODIUM CHLORIDE, POTASSIUM CHLORIDE, SODIUM LACTATE AND CALCIUM CHLORIDE: 600; 310; 30; 20 INJECTION, SOLUTION INTRAVENOUS at 13:34

## 2019-11-18 RX ADMIN — FENTANYL CITRATE 25 MCG: 50 INJECTION INTRAMUSCULAR; INTRAVENOUS at 14:07

## 2019-11-18 RX ADMIN — Medication 0.2 MG: at 13:59

## 2019-11-18 RX ADMIN — PHENYLEPHRINE HYDROCHLORIDE 200 MCG: 10 INJECTION INTRAVENOUS at 14:43

## 2019-11-18 RX ADMIN — MIDAZOLAM HYDROCHLORIDE 2 MG: 1 INJECTION, SOLUTION INTRAMUSCULAR; INTRAVENOUS at 13:53

## 2019-11-18 RX ADMIN — FENTANYL CITRATE 25 MCG: 50 INJECTION INTRAMUSCULAR; INTRAVENOUS at 14:03

## 2019-11-18 RX ADMIN — PHENYLEPHRINE HYDROCHLORIDE 100 MCG: 10 INJECTION INTRAVENOUS at 14:16

## 2019-11-18 RX ADMIN — PHENYLEPHRINE HYDROCHLORIDE 100 MCG: 10 INJECTION INTRAVENOUS at 14:13

## 2019-11-18 RX ADMIN — PHENYLEPHRINE HYDROCHLORIDE 200 MCG: 10 INJECTION INTRAVENOUS at 14:34

## 2019-11-18 RX ADMIN — LIDOCAINE HYDROCHLORIDE 50 MG: 10 INJECTION, SOLUTION EPIDURAL; INFILTRATION; INTRACAUDAL; PERINEURAL at 13:59

## 2019-11-18 RX ADMIN — PHENYLEPHRINE HYDROCHLORIDE 200 MCG: 10 INJECTION INTRAVENOUS at 14:21

## 2019-11-18 RX ADMIN — Medication 0.2 MG: at 14:19

## 2019-11-18 RX ADMIN — KETOROLAC TROMETHAMINE 30 MG: 30 INJECTION, SOLUTION INTRAMUSCULAR; INTRAVENOUS at 14:26

## 2019-11-18 ASSESSMENT — PULMONARY FUNCTION TESTS
PIF_VALUE: 25
PIF_VALUE: 27
PIF_VALUE: 5
PIF_VALUE: 20
PIF_VALUE: 0
PIF_VALUE: 27
PIF_VALUE: 1
PIF_VALUE: 20
PIF_VALUE: 25
PIF_VALUE: 27
PIF_VALUE: 25
PIF_VALUE: 3
PIF_VALUE: 25
PIF_VALUE: 0
PIF_VALUE: 25
PIF_VALUE: 20
PIF_VALUE: 25
PIF_VALUE: 1
PIF_VALUE: 25
PIF_VALUE: 1
PIF_VALUE: 25
PIF_VALUE: 25
PIF_VALUE: 27
PIF_VALUE: 24
PIF_VALUE: 0
PIF_VALUE: 20
PIF_VALUE: 16
PIF_VALUE: 20
PIF_VALUE: 12
PIF_VALUE: 27
PIF_VALUE: 25
PIF_VALUE: 2
PIF_VALUE: 27
PIF_VALUE: 1
PIF_VALUE: 0
PIF_VALUE: 24
PIF_VALUE: 22
PIF_VALUE: 27
PIF_VALUE: 1
PIF_VALUE: 27
PIF_VALUE: 1
PIF_VALUE: 0
PIF_VALUE: 25
PIF_VALUE: 25
PIF_VALUE: 1
PIF_VALUE: 25
PIF_VALUE: 1
PIF_VALUE: 24
PIF_VALUE: 1
PIF_VALUE: 25

## 2019-11-18 ASSESSMENT — PAIN SCALES - GENERAL
PAINLEVEL_OUTOF10: 0
PAINLEVEL_OUTOF10: 0

## 2019-11-18 ASSESSMENT — LIFESTYLE VARIABLES: SMOKING_STATUS: 1

## 2019-11-18 ASSESSMENT — PAIN - FUNCTIONAL ASSESSMENT: PAIN_FUNCTIONAL_ASSESSMENT: 0-10

## 2019-11-20 LAB — SURGICAL PATHOLOGY REPORT: NORMAL

## 2019-12-02 ENCOUNTER — TELEPHONE (OUTPATIENT)
Dept: OBGYN | Age: 44
End: 2019-12-02

## 2019-12-02 RX ORDER — IBUPROFEN 800 MG/1
800 TABLET ORAL EVERY 8 HOURS PRN
Qty: 30 TABLET | Refills: 0 | Status: SHIPPED | OUTPATIENT
Start: 2019-12-02 | End: 2019-12-23

## 2019-12-18 ENCOUNTER — OFFICE VISIT (OUTPATIENT)
Dept: DERMATOLOGY | Age: 44
End: 2019-12-18
Payer: COMMERCIAL

## 2019-12-18 VITALS
OXYGEN SATURATION: 95 % | SYSTOLIC BLOOD PRESSURE: 136 MMHG | WEIGHT: 288 LBS | HEART RATE: 80 BPM | BODY MASS INDEX: 56.54 KG/M2 | DIASTOLIC BLOOD PRESSURE: 93 MMHG | HEIGHT: 60 IN

## 2019-12-18 DIAGNOSIS — L73.2 HIDRADENITIS SUPPURATIVA: Primary | ICD-10-CM

## 2019-12-18 PROCEDURE — 99202 OFFICE O/P NEW SF 15 MIN: CPT | Performed by: DERMATOLOGY

## 2019-12-18 PROCEDURE — 4004F PT TOBACCO SCREEN RCVD TLK: CPT | Performed by: DERMATOLOGY

## 2019-12-18 PROCEDURE — G8417 CALC BMI ABV UP PARAM F/U: HCPCS | Performed by: DERMATOLOGY

## 2019-12-18 PROCEDURE — G8484 FLU IMMUNIZE NO ADMIN: HCPCS | Performed by: DERMATOLOGY

## 2019-12-18 PROCEDURE — G8427 DOCREV CUR MEDS BY ELIG CLIN: HCPCS | Performed by: DERMATOLOGY

## 2019-12-18 RX ORDER — CLINDAMYCIN PHOSPHATE 10 UG/ML
LOTION TOPICAL
Qty: 60 ML | Refills: 3 | Status: SHIPPED | OUTPATIENT
Start: 2019-12-18 | End: 2020-07-31 | Stop reason: SDUPTHER

## 2019-12-18 RX ORDER — DOXYCYCLINE HYCLATE 100 MG/1
CAPSULE ORAL
Qty: 60 CAPSULE | Refills: 2 | Status: SHIPPED | OUTPATIENT
Start: 2019-12-18 | End: 2020-07-31 | Stop reason: SDUPTHER

## 2019-12-19 DIAGNOSIS — N76.0 BACTERIAL VAGINOSIS: ICD-10-CM

## 2019-12-19 DIAGNOSIS — B96.89 BACTERIAL VAGINOSIS: ICD-10-CM

## 2019-12-19 DIAGNOSIS — A59.9 TRICHOMONOSIS: ICD-10-CM

## 2019-12-23 RX ORDER — METRONIDAZOLE 250 MG/1
TABLET ORAL
Qty: 28 TABLET | Refills: 0 | Status: SHIPPED | OUTPATIENT
Start: 2019-12-23 | End: 2020-07-10

## 2019-12-23 RX ORDER — IBUPROFEN 800 MG/1
TABLET ORAL
Qty: 30 TABLET | Refills: 0 | Status: SHIPPED | OUTPATIENT
Start: 2019-12-23 | End: 2020-01-15

## 2020-01-15 RX ORDER — IBUPROFEN 800 MG/1
TABLET ORAL
Qty: 30 TABLET | Refills: 0 | Status: SHIPPED | OUTPATIENT
Start: 2020-01-15 | End: 2020-02-01 | Stop reason: SDUPTHER

## 2020-02-04 RX ORDER — IBUPROFEN 800 MG/1
800 TABLET ORAL EVERY 8 HOURS PRN
Qty: 30 TABLET | Refills: 1 | Status: SHIPPED | OUTPATIENT
Start: 2020-02-04 | End: 2020-02-18 | Stop reason: SDUPTHER

## 2020-02-18 ENCOUNTER — OFFICE VISIT (OUTPATIENT)
Dept: OBGYN | Age: 45
End: 2020-02-18
Payer: COMMERCIAL

## 2020-02-18 ENCOUNTER — HOSPITAL ENCOUNTER (OUTPATIENT)
Age: 45
Setting detail: SPECIMEN
Discharge: HOME | End: 2020-02-18
Payer: COMMERCIAL

## 2020-02-18 ENCOUNTER — OFFICE VISIT (OUTPATIENT)
Dept: INTERNAL MEDICINE | Age: 45
End: 2020-02-18
Payer: COMMERCIAL

## 2020-02-18 ENCOUNTER — HOSPITAL ENCOUNTER (OUTPATIENT)
Age: 45
Setting detail: SPECIMEN
End: 2020-02-18
Payer: COMMERCIAL

## 2020-02-18 VITALS
HEART RATE: 70 BPM | DIASTOLIC BLOOD PRESSURE: 79 MMHG | WEIGHT: 293 LBS | HEIGHT: 60 IN | SYSTOLIC BLOOD PRESSURE: 112 MMHG | BODY MASS INDEX: 57.52 KG/M2

## 2020-02-18 VITALS
HEART RATE: 65 BPM | DIASTOLIC BLOOD PRESSURE: 74 MMHG | TEMPERATURE: 97.5 F | WEIGHT: 293 LBS | SYSTOLIC BLOOD PRESSURE: 118 MMHG | BODY MASS INDEX: 58.98 KG/M2

## 2020-02-18 LAB
ABSOLUTE EOS #: 0.3 K/UL (ref 0–0.44)
ABSOLUTE IMMATURE GRANULOCYTE: <0.03 K/UL (ref 0–0.3)
ABSOLUTE LYMPH #: 2.31 K/UL (ref 1.1–3.7)
ABSOLUTE MONO #: 0.65 K/UL (ref 0.1–1.2)
BASOPHILS # BLD: 1 % (ref 0–2)
BASOPHILS ABSOLUTE: 0.04 K/UL (ref 0–0.2)
BILIRUBIN URINE: NEGATIVE
COLOR: YELLOW
COMMENT UA: ABNORMAL
DIFFERENTIAL TYPE: ABNORMAL
EOSINOPHILS RELATIVE PERCENT: 4 % (ref 1–4)
ESTIMATED AVERAGE GLUCOSE: 143 MG/DL
GLUCOSE URINE: NEGATIVE
HBA1C MFR BLD: 6.6 % (ref 4–6)
HCT VFR BLD CALC: 35.9 % (ref 36.3–47.1)
HEMOGLOBIN: 10.6 G/DL (ref 11.9–15.1)
IMMATURE GRANULOCYTES: 0 %
KETONES, URINE: ABNORMAL
LEUKOCYTE ESTERASE, URINE: NEGATIVE
LYMPHOCYTES # BLD: 29 % (ref 24–43)
MCH RBC QN AUTO: 25.3 PG (ref 25.2–33.5)
MCHC RBC AUTO-ENTMCNC: 29.5 G/DL (ref 28.4–34.8)
MCV RBC AUTO: 85.7 FL (ref 82.6–102.9)
MONOCYTES # BLD: 8 % (ref 3–12)
NITRITE, URINE: NEGATIVE
NRBC AUTOMATED: 0 PER 100 WBC
PDW BLD-RTO: 19.7 % (ref 11.8–14.4)
PH UA: 6 (ref 5–8)
PLATELET # BLD: 277 K/UL (ref 138–453)
PLATELET ESTIMATE: ABNORMAL
PMV BLD AUTO: 12.8 FL (ref 8.1–13.5)
PROTEIN UA: NEGATIVE
RBC # BLD: 4.19 M/UL (ref 3.95–5.11)
RBC # BLD: ABNORMAL 10*6/UL
SEG NEUTROPHILS: 58 % (ref 36–65)
SEGMENTED NEUTROPHILS ABSOLUTE COUNT: 4.78 K/UL (ref 1.5–8.1)
SPECIFIC GRAVITY UA: 1.03 (ref 1–1.03)
TURBIDITY: CLEAR
URINE HGB: NEGATIVE
UROBILINOGEN, URINE: NORMAL
WBC # BLD: 8.1 K/UL (ref 3.5–11.3)
WBC # BLD: ABNORMAL 10*3/UL

## 2020-02-18 PROCEDURE — 36415 COLL VENOUS BLD VENIPUNCTURE: CPT

## 2020-02-18 PROCEDURE — G8417 CALC BMI ABV UP PARAM F/U: HCPCS | Performed by: NURSE PRACTITIONER

## 2020-02-18 PROCEDURE — 99214 OFFICE O/P EST MOD 30 MIN: CPT | Performed by: NURSE PRACTITIONER

## 2020-02-18 PROCEDURE — 99214 OFFICE O/P EST MOD 30 MIN: CPT | Performed by: OBSTETRICS & GYNECOLOGY

## 2020-02-18 PROCEDURE — 87491 CHLMYD TRACH DNA AMP PROBE: CPT

## 2020-02-18 PROCEDURE — G8484 FLU IMMUNIZE NO ADMIN: HCPCS | Performed by: NURSE PRACTITIONER

## 2020-02-18 PROCEDURE — G8417 CALC BMI ABV UP PARAM F/U: HCPCS | Performed by: OBSTETRICS & GYNECOLOGY

## 2020-02-18 PROCEDURE — 85025 COMPLETE CBC W/AUTO DIFF WBC: CPT

## 2020-02-18 PROCEDURE — G8427 DOCREV CUR MEDS BY ELIG CLIN: HCPCS | Performed by: OBSTETRICS & GYNECOLOGY

## 2020-02-18 PROCEDURE — 4004F PT TOBACCO SCREEN RCVD TLK: CPT | Performed by: OBSTETRICS & GYNECOLOGY

## 2020-02-18 PROCEDURE — 83036 HEMOGLOBIN GLYCOSYLATED A1C: CPT

## 2020-02-18 PROCEDURE — 81003 URINALYSIS AUTO W/O SCOPE: CPT

## 2020-02-18 PROCEDURE — 99212 OFFICE O/P EST SF 10 MIN: CPT | Performed by: OBSTETRICS & GYNECOLOGY

## 2020-02-18 PROCEDURE — G8427 DOCREV CUR MEDS BY ELIG CLIN: HCPCS | Performed by: NURSE PRACTITIONER

## 2020-02-18 PROCEDURE — 87591 N.GONORRHOEAE DNA AMP PROB: CPT

## 2020-02-18 PROCEDURE — G8484 FLU IMMUNIZE NO ADMIN: HCPCS | Performed by: OBSTETRICS & GYNECOLOGY

## 2020-02-18 PROCEDURE — 4004F PT TOBACCO SCREEN RCVD TLK: CPT | Performed by: NURSE PRACTITIONER

## 2020-02-18 RX ORDER — DOCUSATE SODIUM 100 MG/1
100 CAPSULE, LIQUID FILLED ORAL 2 TIMES DAILY
Qty: 60 CAPSULE | Refills: 2 | Status: SHIPPED | OUTPATIENT
Start: 2020-02-18 | End: 2020-02-18

## 2020-02-18 RX ORDER — SULFAMETHOXAZOLE AND TRIMETHOPRIM 800; 160 MG/1; MG/1
1 TABLET ORAL 2 TIMES DAILY
Qty: 6 TABLET | Refills: 0 | Status: SHIPPED | OUTPATIENT
Start: 2020-02-18 | End: 2020-02-21

## 2020-02-18 RX ORDER — GUAIFENESIN 600 MG/1
1200 TABLET, EXTENDED RELEASE ORAL 2 TIMES DAILY
Qty: 40 TABLET | Refills: 0 | Status: SHIPPED | OUTPATIENT
Start: 2020-02-18 | End: 2020-02-28

## 2020-02-18 RX ORDER — DOCUSATE SODIUM 100 MG/1
100 CAPSULE, LIQUID FILLED ORAL 2 TIMES DAILY
Qty: 60 CAPSULE | Refills: 2 | Status: SHIPPED | OUTPATIENT
Start: 2020-02-18 | End: 2020-03-19

## 2020-02-18 RX ORDER — TIZANIDINE HYDROCHLORIDE 4 MG/1
4 CAPSULE, GELATIN COATED ORAL 3 TIMES DAILY
Qty: 90 CAPSULE | Refills: 1 | Status: SHIPPED | OUTPATIENT
Start: 2020-02-18 | End: 2020-04-27

## 2020-02-18 RX ORDER — LANOLIN ALCOHOL/MO/W.PET/CERES
CREAM (GRAM) TOPICAL
COMMUNITY
Start: 2020-02-07 | End: 2020-07-10

## 2020-02-18 RX ORDER — IBUPROFEN 800 MG/1
800 TABLET ORAL EVERY 8 HOURS PRN
Qty: 30 TABLET | Refills: 1 | Status: SHIPPED | OUTPATIENT
Start: 2020-02-18 | End: 2020-04-09

## 2020-02-18 ASSESSMENT — PATIENT HEALTH QUESTIONNAIRE - PHQ9: DEPRESSION UNABLE TO ASSESS: PT REFUSES

## 2020-02-18 NOTE — PROGRESS NOTES
Guillaume Kilpatrick  2020    YOB: 1975          The patient was seen today. She is here regarding pelvic pain . Her bowels are not  regular and she admits to urinary urgency, denies dysuria or frequency. HPI:  Guillaume Kilpatrick is a 45 y.o. female [id number] Patient's last menstrual period was 20. Period lasted 9 days. Previous period in December lasted about 10 days. Mirena IUD was placed 19 for treatment of AUB. Pt reports intermittent cramping lower abdominal pain that is improved with BM. She admits to constipation. There is urinary urgency. The pt was sexually active once in December. That was the only intercourse since H-scope , D&C and Mirena placement. Pt is unsure if partner was ever treated for chlamydia. She was treated 2019.          OB History    Para Term  AB Living   5 3 3 0 2 4   SAB TAB Ectopic Molar Multiple Live Births   1 1 0 0 1 4      # Outcome Date GA Lbr Mckinley/2nd Weight Sex Delivery Anes PTL Lv   5 Term      Vag-Spont   ARNOLDO   4 SAB            3 Term 18    F Vag-Spont   ARNOLDO   2 TAB            1A Term      Vag-Spont   ARNOLDO   1B Term      Vag-Spont   ARNOLDO       Past Medical History:   Diagnosis Date    Diabetes mellitus ([facility]     I do not know    GERD (gastroesophageal reflux disease)     Hidradenitis suppurativa     Hyperlipidemia     Hypertension     PCP Joy Rudolph NP, seen 10/2019    Obesity, morbid, BMI 50 or higher ([facility]     Sleep apnea     CPAP use at 16       Past Surgical History:   Procedure Laterality Date    ABSCESS DRAINAGE  2016    BUTTOCK    DILATION AND CURETTAGE OF UTERUS N/A 2019    DILATATION AND CURETTAGE HYSTEROSCOPY, MYOSURE, MIRENA IUD INSERTION performed by Chelsea Cunha MD at [address] [place] [address] Drive HYSTEROSCOPY  2019     DILATATION AND CURETTAGE HYSTEROSCOPY, MYOSURE, MIRENA IUD INSERTION     [place] RECTUM SURGERY PROCEDURE UNLISTED N/A 2018    RECTAL PERIRECTAL INCISION AND DRAINAGE performed by Juanis Ivan DO at [facility]       Family History   Problem Relation Age of Onset   Cm Mom Vision Loss Mother     Glaucoma Mother     Diabetes Father     High Blood Pressure Father     Glaucoma Father     Kidney Cancer Maternal Grandmother     Kidney stones Brother     No Known Problems Maternal Grandfather     Diabetes Paternal Grandmother     Other Brother         stomach problem       Social History     Socioeconomic History    Marital status: Single     Spouse name: Not on file    Number of children: 4    Years of education: 8th grade    Highest education level: Not on file   Occupational History    Occupation: unemployed     Comment: working on [employer] strain: Not on file    Food insecurity:     Worry: Not on file     Inability: Not on file   [employer] needs:     Medical: Not on file     Non-medical: Not on file   Tobacco Use    Smoking status: Current Every Day Smoker     Packs/day: 0.50     Years: 30.00     Pack years: 15.00     Types: Cigarettes    Smokeless tobacco: Never Used   Substance and Sexual Activity    Alcohol use: Not Currently     Comment: Occasionally    Drug use: Yes     Frequency: 1.5 times per week     Types: Marijuana     Comment: 4 x a month    Sexual activity: Yes     Partners: Male     Comment: 1 partner boyfriend   Lifestyle    Physical activity:     Days per week: Not on file     Minutes per session: Not on file    Stress: Not on file   Relationships    Social connections:     Talks on phone: Not on file     Gets together: Not on file     Attends Caodaism service: Not on file     Active member of club or organization: Not on file     Attends meetings of clubs or organizations: Not on file     Relationship status: Not on file    Intimate partner violence:     Fear of current or ex partner: Not on file     Emotionally abused: Not on file     Physically abused: Not on file Forced sexual activity: Not on file   Other Topics Concern    Not on file   Social History Narrative    Lives with 4 kids age 24yo to 32year old         MEDICATIONS:  Current Outpatient Medications on File Prior to Visit   Medication Sig Dispense Refill    doxycycline hyclate (VIBRAMYCIN) 100 MG capsule Take 1 pill twice daily with food 60 capsule 2    benzoyl peroxide 5 % external liquid Wash face, chest and back 1-2 times daily 227 g 3    clindamycin (CLEOCIN T) 1 % lotion Apply to affected areas daily 60 mL 3    metroNIDAZOLE (FLAGYL) 250 MG tablet take 2 tablets by mouth twice a day for 7 days (Patient not taking: Reported on 2/18/2020) 28 tablet 0     No current facility-administered medications on file prior to visit. ALLERGIES:  Allergies as of 02/18/2020    (No Known Allergies)       Blood pressure 118/74, pulse 65, temperature 97.5 °F (36.4 °C), temperature source Oral, weight (!) 302 lb (137 kg), last menstrual period 02/07/2020, not currently breastfeeding. Abdomen:  Obese, large pannus, Soft non-tender; good bowel sounds. No guarding, rebound or rigidity. No CVA tenderness bilaterally. Extremities: No calf tenderness, DTR 2/4, and No edema bilaterally    Pelvic: Vulva and vagina appear normal. Bimanual exam reveals normal uterus and adnexa. With the aid of a long Graves speculum the cervix was visualized and IUD string seen.          Lab Results:  Results for orders placed or performed during the hospital encounter of 11/18/19   Hemoglobin and hematocrit, blood   Result Value Ref Range    POC Hemoglobin 11.0 (L) 12.0 - 16.0 g/dL    POC Hematocrit 32 (L) 36 - 46 %   SODIUM (POC)   Result Value Ref Range    POC Sodium 142 138 - 146 mmol/L   POTASSIUM (POC)   Result Value Ref Range    POC Potassium 4.9 (H) 3.5 - 4.5 mmol/L   CHLORIDE (POC)   Result Value Ref Range    POC Chloride 108 (H) 98 - 107 mmol/L   Surgical Pathology   Result Value Ref Range    Surgical Pathology Report PLAN:  Return in about 6 months (around 8/18/2020) for Annual exam.  1. Pelvic pain, suspect constipation  - colace  - Chlamydia/GC DNA, Urine; Future    2. IUD check up  -continue routine care, anticipate improvement of menstrual bleeding over the next several months    3. Urgency of urination    - Urinalysis Reflex to Culture; Future  - sulfamethoxazole-trimethoprim (BACTRIM DS) 800-160 MG per tablet; Take 1 tablet by mouth 2 times daily for 3 days  Dispense: 6 tablet; Refill: 0    4. Abnormal uterine bleeding (AUB)  - continue with IUD  - CBC Auto Differential; Future    5. Slow transit constipation    - docusate sodium (COLACE) 100 MG capsule; Take 1 capsule by mouth 2 times daily  Dispense: 60 capsule; Refill: 2      Return to the office in 6 months. Counseled on preventative health maintenance follow-up. Orders Placed This Encounter   Procedures    Chlamydia/GC DNA, Urine     Standing Status:   Future     Standing Expiration Date:   2/18/2021    CBC Auto Differential     Standing Status:   Future     Standing Expiration Date:   2/18/2021    Urinalysis Reflex to Culture     Standing Status:   Future     Standing Expiration Date:   2/18/2021     Order Specific Question:   SPECIFY(EX-CATH,MIDSTREAM,CYSTO,ETC)? Answer:   midstream       Patient was seen with total face to face time of 25 minutes. More than 50% of this visit was counseling and education regarding The primary encounter diagnosis was Urgency of urination. Diagnoses of Pelvic pain, IUD check up, Abnormal uterine bleeding (AUB), and Slow transit constipation were also pertinent to this visit. and Follow-up (pelvic pain,IUD check)   as well as  counseling on preventative health maintenance follow-up.

## 2020-02-18 NOTE — PROGRESS NOTES
pain.   Musculoskeletal: Positive for back pain. Negative for neck pain. Neurological: Negative for tingling, weakness, numbness, headaches and paresthesias. All other systems reviewed and are negative. Objective:   Physical Exam  Vitals signs and nursing note reviewed. Constitutional:       Appearance: Normal appearance. HENT:      Head: Normocephalic and atraumatic. Right Ear: Tympanic membrane, ear canal and external ear normal.      Left Ear: Tympanic membrane, ear canal and external ear normal.      Nose: Nose normal.      Mouth/Throat:      Mouth: Mucous membranes are moist.      Pharynx: Oropharynx is clear. Eyes:      Extraocular Movements: Extraocular movements intact. Conjunctiva/sclera: Conjunctivae normal.      Pupils: Pupils are equal, round, and reactive to light. Neck:      Musculoskeletal: Normal range of motion and neck supple. Cardiovascular:      Rate and Rhythm: Normal rate and regular rhythm. Pulmonary:      Effort: Pulmonary effort is normal.      Breath sounds: Normal breath sounds. Abdominal:      General: Bowel sounds are normal.      Palpations: Abdomen is soft. Musculoskeletal: Normal range of motion. Lumbar back: She exhibits pain and spasm. Skin:     General: Skin is warm and dry. Neurological:      General: No focal deficit present. Mental Status: She is alert and oriented to person, place, and time. Psychiatric:         Mood and Affect: Mood normal.         Behavior: Behavior normal.         Thought Content: Thought content normal.         Judgment: Judgment normal.         Assessment/Plan:      1. Chronic bilateral low back pain without sciatica  - ibuprofen (ADVIL;MOTRIN) 800 MG tablet; Take 1 tablet by mouth every 8 hours as needed for Pain  Dispense: 30 tablet; Refill: 1  - tiZANidine (ZANAFLEX) 4 MG capsule; Take 1 capsule by mouth 3 times daily (Patient not taking: Reported on 2/18/2020)  Dispense: 90 capsule;  Refill: [address] PPSV23) 11/17/1983    Diabetic foot exam  11/17/1987    Diabetic retinal exam  11/17/1987    DTaP/Tdap/Td vaccine (1 - Tdap) 11/17/1988    Hepatitis B vaccine (1 of 3 - Risk 3-dose series) 11/17/1996    Diabetic microalbuminuria test  05/11/2019    Flu vaccine (1) 06/30/2020 (Originally 9/1/2019)    Lipid screen  09/11/2020    A1C test (Diabetic or Prediabetic)  02/18/2021    Cervical cancer screen  04/07/2022    Shingles Vaccine (1 of 2) 11/17/2027    HIV screen  Completed    Hepatitis A vaccine  Aged Out    Hib vaccine  Aged Out    Meningococcal (ACWY) vaccine  Aged Out

## 2020-02-19 LAB
C. TRACHOMATIS DNA ,URINE: NEGATIVE
N. GONORRHOEAE DNA, URINE: NEGATIVE
SPECIMEN DESCRIPTION: NORMAL

## 2020-02-25 ENCOUNTER — HOSPITAL ENCOUNTER (OUTPATIENT)
Dept: PHYSICAL THERAPY | Age: 45
Setting detail: THERAPIES SERIES
Discharge: HOME | End: 2020-02-25
Payer: COMMERCIAL

## 2020-02-27 ASSESSMENT — ENCOUNTER SYMPTOMS
ABDOMINAL PAIN: 0
SWOLLEN GLANDS: 0
HOARSE VOICE: 0
BOWEL INCONTINENCE: 0
SINUS PRESSURE: 1
SINUS COMPLAINT: 1
SORE THROAT: 0
SHORTNESS OF BREATH: 0
COUGH: 1
BACK PAIN: 1

## 2020-03-24 RX ORDER — IBUPROFEN 800 MG/1
TABLET ORAL
Qty: 30 TABLET | Refills: 1 | OUTPATIENT
Start: 2020-03-24

## 2020-04-09 RX ORDER — IBUPROFEN 800 MG/1
TABLET ORAL
Qty: 30 TABLET | Refills: 1 | Status: SHIPPED | OUTPATIENT
Start: 2020-04-09 | End: 2020-07-10

## 2020-04-09 NOTE — TELEPHONE ENCOUNTER
Refill request for Ibuprofen. If appropriate please send medication(s) to patients pharmacy. Next appt: 05/19/2020      Health Maintenance   Topic Date Due    Pneumococcal 0-64 years Vaccine (1 of 1 - PPSV23) 11/17/1983    Diabetic foot exam  11/17/1987    Diabetic retinal exam  11/17/1987    Hepatitis B vaccine (1 of 3 - Risk 3-dose series) 11/17/1996    DTaP/Tdap/Td vaccine (1 - Tdap) 11/17/1996    Diabetic microalbuminuria test  05/11/2019    Flu vaccine (Season Ended) 09/01/2020    Lipid screen  09/11/2020    A1C test (Diabetic or Prediabetic)  02/18/2021    Cervical cancer screen  04/07/2022    HIV screen  Completed    Hepatitis A vaccine  Aged Out    Hib vaccine  Aged Out    Meningococcal (ACWY) vaccine  Aged Out       Hemoglobin A1C (%)   Date Value   02/18/2020 6.6 (H)   09/11/2019 6.2 (H)   05/11/2018 5.8             ( goal A1C is < 7)   Microalb/Crt.  Ratio (mcg/mg creat)   Date Value   05/11/2018 CANNOT BE CALCULATED     LDL Cholesterol (mg/dL)   Date Value   09/11/2019 81       (goal LDL is <100)   AST (U/L)   Date Value   05/11/2018 11     ALT (U/L)   Date Value   11/14/2019 15     BUN (mg/dL)   Date Value   11/14/2019 7     BP Readings from Last 3 Encounters:   02/18/20 118/74   02/18/20 112/79   12/18/19 (!) 136/93          (goal 120/80)          Patient Active Problem List:     Hyperlipidemia     Hypertension     Diabetes mellitus ([facility]     GERD (gastroesophageal reflux disease)     Obesity, morbid, BMI 50 or higher (HCC)     Hidradenitis suppurativa     Sleep apnea     Perirectal abscess     Controlled type 2 diabetes mellitus without complication, with long-term current use of insulin (HCC)     Iron deficiency anemia     Dysmenorrhea     Menorrhagia     Acute cystitis without hematuria     Abnormal uterine bleeding (AUB)     Hysteroscopy, D&C, Myosure, Mirena IUD Insertion 11/18/19

## 2020-04-26 ENCOUNTER — APPOINTMENT (OUTPATIENT)
Dept: GENERAL RADIOLOGY | Age: 45
End: 2020-04-26
Payer: COMMERCIAL

## 2020-04-26 ENCOUNTER — HOSPITAL ENCOUNTER (EMERGENCY)
Age: 45
Discharge: HOME | End: 2020-04-26
Attending: EMERGENCY MEDICINE
Payer: COMMERCIAL

## 2020-04-26 VITALS
RESPIRATION RATE: 20 BRPM | HEIGHT: 60 IN | WEIGHT: 280 LBS | DIASTOLIC BLOOD PRESSURE: 82 MMHG | BODY MASS INDEX: 54.97 KG/M2 | OXYGEN SATURATION: 99 % | HEART RATE: 72 BPM | SYSTOLIC BLOOD PRESSURE: 165 MMHG | TEMPERATURE: 98.5 F

## 2020-04-26 PROCEDURE — 99283 EMERGENCY DEPT VISIT LOW MDM: CPT

## 2020-04-26 PROCEDURE — 6370000000 HC RX 637 (ALT 250 FOR IP): Performed by: PHYSICIAN ASSISTANT

## 2020-04-26 PROCEDURE — 72040 X-RAY EXAM NECK SPINE 2-3 VW: CPT

## 2020-04-26 RX ORDER — TRAMADOL HYDROCHLORIDE 50 MG/1
50 TABLET ORAL EVERY 8 HOURS PRN
Qty: 9 TABLET | Refills: 0 | Status: SHIPPED | OUTPATIENT
Start: 2020-04-26 | End: 2020-04-29

## 2020-04-26 RX ORDER — IBUPROFEN 600 MG/1
600 TABLET ORAL ONCE
Status: COMPLETED | OUTPATIENT
Start: 2020-04-26 | End: 2020-04-26

## 2020-04-26 RX ORDER — IBUPROFEN 600 MG/1
600 TABLET ORAL EVERY 6 HOURS PRN
Qty: 30 TABLET | Refills: 0 | Status: SHIPPED | OUTPATIENT
Start: 2020-04-26 | End: 2020-07-10 | Stop reason: SDUPTHER

## 2020-04-26 RX ORDER — TRAMADOL HYDROCHLORIDE 50 MG/1
50 TABLET ORAL ONCE
Status: COMPLETED | OUTPATIENT
Start: 2020-04-26 | End: 2020-04-26

## 2020-04-26 RX ADMIN — TRAMADOL HYDROCHLORIDE 50 MG: 50 TABLET ORAL at 16:01

## 2020-04-26 RX ADMIN — IBUPROFEN 600 MG: 600 TABLET, FILM COATED ORAL at 16:02

## 2020-04-26 ASSESSMENT — PAIN DESCRIPTION - PAIN TYPE: TYPE: ACUTE PAIN

## 2020-04-26 ASSESSMENT — PAIN SCALES - GENERAL
PAINLEVEL_OUTOF10: 10

## 2020-04-26 NOTE — ED PROVIDER NOTES
DILATATION AND CURETTAGE HYSTEROSCOPY, [id number] [address], MIRENA IUD INSERTION performed by Sanjiv Good MD at [address]  2019     DILATATION AND CURETTAGE HYSTEROSCOPY, [address] [address], [address] IUD INSERTION     [place] RECTUM SURGERY PROCEDURE UNLISTED N/A 2018    RECTAL PERIRECTAL INCISION AND DRAINAGE performed by Alec Rosenberg DO at [address] [place] [address]        Discharge Medication List as of 2020  4:06 PM      CONTINUE these medications which have NOT CHANGED    Details   !! ibuprofen (ADVIL;MOTRIN) 800 MG tablet take 1 tablet by mouth every 8 hours if needed for pain, Disp-30 tablet, R-1Normal      ferrous sulfate 325 (65 Fe) MG EC tablet take 1 tablet by mouth twice a dayHistorical Med      tiZANidine (ZANAFLEX) 4 MG capsule Take 1 capsule by mouth 3 times daily, Disp-90 capsule, R-1Normal      metroNIDAZOLE (FLAGYL) 250 MG tablet take 2 tablets by mouth twice a day for 7 days, Disp-28 tablet, R-0Normal      doxycycline hyclate (VIBRAMYCIN) 100 MG capsule Take 1 pill twice daily with food, Disp-60 capsule, R-2Normal      benzoyl peroxide 5 % external liquid Wash face, chest and back 1-2 times daily, Disp-227 g, R-3, Normal      clindamycin (CLEOCIN T) 1 % lotion Apply to affected areas daily, Disp-60 mL, R-3, Normal       !! - Potential duplicate medications found. Please discuss with provider. ALLERGIES     has No Known Allergies. FAMILY HISTORY     She indicated that her mother is alive. She indicated that her father is alive. She indicated that her sister is alive. She indicated that only one of her two brothers is alive. She indicated that her maternal grandmother is . She indicated that her maternal grandfather is . She indicated that her paternal grandmother is . She indicated that her paternal grandfather is alive. She indicated that her daughter is alive.        SOCIAL HISTORY      reports that she has been smoking cigarettes. She has a 15.00 pack-year smoking history. She has never used smokeless tobacco. She reports previous alcohol use. She reports current drug use. Frequency: 1.50 times per week. Drug: Marijuana. PHYSICAL EXAM     INITIAL VITALS: BP (!) 165/82   Pulse 72   Temp 98.5 °F (36.9 °C) (Oral)   Resp 20   Ht 5' (1.524 m)   Wt 280 lb (127 kg)   LMP 04/22/2020 (Approximate)   SpO2 99%   BMI 54.68 kg/m²      Physical Exam  Vitals signs and nursing note reviewed. Constitutional:       Appearance: She is well-developed. HENT:      Head: Normocephalic and atraumatic. Cardiovascular:      Rate and Rhythm: Normal rate and regular rhythm. Heart sounds: Normal heart sounds. Pulmonary:      Effort: Pulmonary effort is normal.      Breath sounds: Normal breath sounds. Musculoskeletal:      Cervical back: She exhibits pain and spasm. She exhibits no tenderness, no bony tenderness, no swelling, no edema, no deformity and normal pulse. Back:       Comments: Pain left trapezius area radiates to the left shoulder left neck area. No redness no swelling no bony point tenderness there is no signs of trauma. Radial pulses palpable decreased range of motion as it causes pain pain when turning head to the left. Brisk capillary refill distally deep tendon reflexes 2+ distally   Neurological:      Mental Status: She is alert and oriented to person, place, and time. MEDICAL DECISION MAKING:   Apply warm compresses to affected area rest with head and shoulders well aligned increase fluids medications as directed gentle massage may be beneficial.  Gentle stretching as tolerated close follow-up with primary care for recheck return if worse or other concerns.     DIAGNOSTIC RESULTS     EKG: All EKG's are interpreted by the Emergency Department Physician who either signs or Co-signs this chart in the absence of acardiologist.    RADIOLOGY:Allplain film, CT, MRI, and formal ultrasound images (except ED bedside ultrasound) are read by the radiologist and the images and interpretations are directly viewed by the emergency physician. LABS:All lab results were reviewed by myself, and all abnormals are listed below. Labs Reviewed - No data to display      EMERGENCY DEPARTMENT COURSE:   Vitals:    Vitals:    04/26/20 1428   BP: (!) 165/82   Pulse: 72   Resp: 20   Temp: 98.5 °F (36.9 °C)   TempSrc: Oral   SpO2: 99%   Weight: 280 lb (127 kg)   Height: 5' (1.524 m)       The patient was given the following medications while in the emergency department:  Orders Placed This Encounter   Medications    traMADol (ULTRAM) tablet 50 mg    ibuprofen (ADVIL;MOTRIN) tablet 600 mg    traMADol (ULTRAM) 50 MG tablet     Sig: Take 1 tablet by mouth every 8 hours as needed for Pain for up to 3 days. Dispense:  9 tablet     Refill:  0    ibuprofen (IBU) 600 MG tablet     Sig: Take 1 tablet by mouth every 6 hours as needed for Pain     Dispense:  30 tablet     Refill:  0       -------------------------      CRITICAL CARE:       CONSULTS:  None    PROCEDURES:  Procedures    FINAL IMPRESSION      1. Torticollis          DISPOSITION/PLAN   DISPOSITION Decision To Discharge 04/26/2020 03:59:38 PM      PATIENT REFERREDTO:  JAQUELIN Stafford [address]  [phone]    Schedule an appointment as soon as possible for a visit in 2 days      [facility] ED  [place] [address]  [phone]    If symptoms worsen      DISCHARGEMEDICATIONS:  Discharge Medication List as of 4/26/2020  4:06 PM      START taking these medications    Details   traMADol (ULTRAM) 50 MG tablet Take 1 tablet by mouth every 8 hours as needed for Pain for up to 3 days. , Disp-9 tablet, R-0Print      !! ibuprofen (IBU) 600 MG tablet Take 1 tablet by mouth every 6 hours as needed for Pain, Disp-30 tablet, R-0Print       !! - Potential duplicate medications found. Please discuss with provider. (Please note that portions of this note were completed with a voice recognition program.  Efforts were made to edit thedictations but occasionally words are mis-transcribed.)    ANAMIKA Mccartney PA-C  04/26/20 ANAMIKA Hebert  04/26/20 [id number]

## 2020-04-26 NOTE — ED PROVIDER NOTES
[address] ED  eMERGENCY dEPARTMENT eNCOUnter   Independent Attestation     Pt Name: Martin Woodruff  MRN: [id number]  [place] 1975  Date of evaluation: 4/26/20       Martin Woodruff is a 45 y.o. female who presents with Arm Pain (L, ongoing x2 days)        Based on the medical record, the care appears appropriate. I was personally available for consultation in the Emergency Department.     Miguel Angel Esposito MD  Attending Emergency  Physician                  Miguel Angel Esposito MD  04/26/20 9442

## 2020-04-27 RX ORDER — TIZANIDINE 4 MG/1
TABLET ORAL
Qty: 90 TABLET | Refills: 0 | Status: SHIPPED | OUTPATIENT
Start: 2020-04-27 | End: 2020-07-02

## 2020-04-27 RX ORDER — ACETAMINOPHEN 500 MG
TABLET ORAL
Qty: 30 TABLET | Refills: 0 | Status: SHIPPED | OUTPATIENT
Start: 2020-04-27 | End: 2020-07-01 | Stop reason: SDUPTHER

## 2020-07-01 NOTE — TELEPHONE ENCOUNTER
Request for tizanidine - med pended. Please fill if appropriate. Patient has follow up 7/10. Next Visit Date:  Future Appointments   Date Time Provider [place]   7/10/2020 12:20 PM JAQUELIN Hinds - CNP [facility] IM [facility]   8/18/2020 11:00 AM Dominga Nichols MD [facility] OB/Gyn [address] Maintenance   Topic Date Due    Pneumococcal 0-64 years Vaccine (1 of 1 - PPSV23) 11/17/1983    Diabetic foot exam  11/17/1987    Diabetic retinal exam  11/17/1987    Hepatitis B vaccine (1 of 3 - Risk 3-dose series) 11/17/1996    DTaP/Tdap/Td vaccine (1 - Tdap) 11/17/1996    Diabetic microalbuminuria test  05/11/2019    Flu vaccine (1) 09/01/2020    Lipid screen  09/11/2020    A1C test (Diabetic or Prediabetic)  02/18/2021    Cervical cancer screen  04/07/2022    HIV screen  Completed    Hepatitis A vaccine  Aged Out    Hib vaccine  Aged Out    Meningococcal (ACWY) vaccine  Aged Out       Hemoglobin A1C (%)   Date Value   02/18/2020 6.6 (H)   09/11/2019 6.2 (H)   05/11/2018 5.8             ( goal A1C is < 7)   Microalb/Crt.  Ratio (mcg/mg creat)   Date Value   05/11/2018 CANNOT BE CALCULATED     LDL Cholesterol (mg/dL)   Date Value   09/11/2019 81       (goal LDL is <100)   AST (U/L)   Date Value   05/11/2018 11     ALT (U/L)   Date Value   11/14/2019 15     BUN (mg/dL)   Date Value   11/14/2019 7     BP Readings from Last 3 Encounters:   04/26/20 (!) 165/82   02/18/20 118/74   02/18/20 112/79          (goal 120/80)    All Future Testing planned in [facility]  Lab Frequency Next Occurrence   Baseline Diagnostic Sleep Study Once 02/19/2020         Patient Active Problem List:     Hyperlipidemia     Hypertension     Diabetes mellitus ([address]     GERD (gastroesophageal reflux disease)     Obesity, morbid, BMI 50 or higher (HCC)     Hidradenitis suppurativa     Sleep apnea     Perirectal abscess     Controlled type 2 diabetes mellitus without complication, with long-term current use of insulin ([address] Iron deficiency anemia     Dysmenorrhea     Menorrhagia     Acute cystitis without hematuria     Abnormal uterine bleeding (AUB)     Hysteroscopy, D&C, Myosure, Mirena IUD Insertion 11/18/19

## 2020-07-01 NOTE — TELEPHONE ENCOUNTER
Request for acetaminophen - med pended. Please fill if appropriate. Patient has follow up 7/10. Next Visit Date:  Future Appointments   Date Time Provider [place]   7/10/2020 12:20 PM JAQUELIN Hinds - CNP [facility] IM [id number]   8/18/2020 11:00 AM Dominga Nichols MD [facility] OB/Gyn [address] Maintenance   Topic Date Due    Pneumococcal 0-64 years Vaccine (1 of 1 - PPSV23) 11/17/1983    Diabetic foot exam  11/17/1987    Diabetic retinal exam  11/17/1987    Hepatitis B vaccine (1 of 3 - Risk 3-dose series) 11/17/1996    DTaP/Tdap/Td vaccine (1 - Tdap) 11/17/1996    Diabetic microalbuminuria test  05/11/2019    Flu vaccine (1) 09/01/2020    Lipid screen  09/11/2020    A1C test (Diabetic or Prediabetic)  02/18/2021    Cervical cancer screen  04/07/2022    HIV screen  Completed    Hepatitis A vaccine  Aged Out    Hib vaccine  Aged Out    Meningococcal (ACWY) vaccine  Aged Out       Hemoglobin A1C (%)   Date Value   02/18/2020 6.6 (H)   09/11/2019 6.2 (H)   05/11/2018 5.8             ( goal A1C is < 7)   Microalb/Crt.  Ratio (mcg/mg creat)   Date Value   05/11/2018 CANNOT BE CALCULATED     LDL Cholesterol (mg/dL)   Date Value   09/11/2019 81       (goal LDL is <100)   AST (U/L)   Date Value   05/11/2018 11     ALT (U/L)   Date Value   11/14/2019 15     BUN (mg/dL)   Date Value   11/14/2019 7     BP Readings from Last 3 Encounters:   04/26/20 (!) 165/82   02/18/20 118/74   02/18/20 112/79          (goal 120/80)    All Future Testing planned in [facility]  Lab Frequency Next Occurrence   Baseline Diagnostic Sleep Study Once 02/19/2020         Patient Active Problem List:     Hyperlipidemia     Hypertension     Diabetes mellitus ([address]     GERD (gastroesophageal reflux disease)     Obesity, morbid, BMI 50 or higher (HCC)     Hidradenitis suppurativa     Sleep apnea     Perirectal abscess     Controlled type 2 diabetes mellitus without complication, with long-term current use of insulin ([address] Iron deficiency anemia     Dysmenorrhea     Menorrhagia     Acute cystitis without hematuria     Abnormal uterine bleeding (AUB)     Hysteroscopy, D&C, Myosure, Mirena IUD Insertion 11/18/19

## 2020-07-02 RX ORDER — TIZANIDINE 4 MG/1
TABLET ORAL
Qty: 90 TABLET | Refills: 0 | Status: SHIPPED | OUTPATIENT
Start: 2020-07-02 | End: 2020-07-31 | Stop reason: SDUPTHER

## 2020-07-02 RX ORDER — ACETAMINOPHEN 500 MG
TABLET ORAL
Qty: 30 TABLET | Refills: 0 | Status: SHIPPED | OUTPATIENT
Start: 2020-07-02 | End: 2020-07-31 | Stop reason: SDUPTHER

## 2020-07-10 ENCOUNTER — VIRTUAL VISIT (OUTPATIENT)
Dept: INTERNAL MEDICINE | Age: 45
End: 2020-07-10
Payer: COMMERCIAL

## 2020-07-10 PROCEDURE — 99214 OFFICE O/P EST MOD 30 MIN: CPT | Performed by: NURSE PRACTITIONER

## 2020-07-10 RX ORDER — IBUPROFEN 600 MG/1
600 TABLET ORAL EVERY 6 HOURS PRN
Qty: 30 TABLET | Refills: 0 | Status: SHIPPED | OUTPATIENT
Start: 2020-07-10 | End: 2021-03-08

## 2020-07-10 ASSESSMENT — PATIENT HEALTH QUESTIONNAIRE - PHQ9
SUM OF ALL RESPONSES TO PHQ QUESTIONS 1-9: 0
1. LITTLE INTEREST OR PLEASURE IN DOING THINGS: 0
SUM OF ALL RESPONSES TO PHQ QUESTIONS 1-9: 0
2. FEELING DOWN, DEPRESSED OR HOPELESS: 0
SUM OF ALL RESPONSES TO PHQ9 QUESTIONS 1 & 2: 0

## 2020-07-10 ASSESSMENT — ENCOUNTER SYMPTOMS
BOWEL INCONTINENCE: 0
BACK PAIN: 1
ABDOMINAL PAIN: 0

## 2020-07-10 NOTE — PATIENT INSTRUCTIONS
After Visit Summary mailed to the patient along with appointment card and all other paperwork/orders. LABORATORY INSTRUCTIONS    Your doctor has ordered blood or urine testing. You can get this testing done at the Lab located on the first floor of the [facility], or at any other [facility]. Please stop at Main Registration, before going to the lab, as you must be registered first.     Please get this lab done before your next visit. You may eat or drink before this test.      Referral for Physical Therapy/Occupational Therapy was sent to [facility]. Patient was given a copy of the referral and advised to contact facility within 48 hours to schedule appointment. An order for Heating Pad was given to patient. Patient was advised that this order must be taken to a [address] in order to receive product.  List of DME Suppliers was given to patient along with order.     -SHERRY Calderon

## 2020-07-10 NOTE — PROGRESS NOTES
7/10/2020    TELEHEALTH EVALUATION -- Audio/Visual (During UGTIM-78 public health emergency)    HPI:    Scout Sharpe (:  1975) has requested an audio/video evaluation for the following concern(s):    Back Pain   This is a chronic problem. The problem occurs constantly. The problem has been waxing and waning since onset. The pain is present in the lumbar spine and thoracic spine. The quality of the pain is described as aching. Radiates to: neck and left shoulder. The pain is at a severity of 6/10. The pain is moderate. The pain is the same all the time. The symptoms are aggravated by position. Stiffness is present all day. Pertinent negatives include no abdominal pain, bladder incontinence, bowel incontinence, chest pain, dysuria, fever, headaches, leg pain, numbness, paresis, paresthesias, pelvic pain, perianal numbness, tingling, weakness or weight loss. Risk factors include lack of exercise, obesity and sedentary lifestyle. She has tried analgesics and NSAIDs for the symptoms. The treatment provided no relief. Review of Systems   Constitutional: Negative for fever and weight loss. Cardiovascular: Negative for chest pain. Gastrointestinal: Negative for abdominal pain and bowel incontinence. Genitourinary: Negative for bladder incontinence, dysuria and pelvic pain. Musculoskeletal: Positive for arthralgias, back pain, myalgias, neck pain and neck stiffness. Neurological: Negative for tingling, weakness, numbness, headaches and paresthesias. All other systems reviewed and are negative. Prior to Visit Medications    Medication Sig Taking?  Authorizing Provider   ibuprofen (IBU) 600 MG tablet Take 1 tablet by mouth every 6 hours as needed for Pain Yes JAQUELIN Mcneil CNP   tiZANidine (ZANAFLEX) 4 MG tablet take 1 tablet by mouth three times a day Yes JAQUELIN Mcneil CNP   acetaminophen (RA ACETAMINOPHEN EX ST) 500 MG tablet take 2 tablets by mouth every 8 hours if needed for pain Yes Gina Lamas APRN - CNP   doxycycline hyclate (VIBRAMYCIN) 100 MG capsule Take 1 pill twice daily with food Yes Claire Barron MD   benzoyl peroxide 5 % external liquid Wash face, chest and back 1-2 times daily Yes Claire Barron MD   clindamycin (CLEOCIN T) 1 % lotion Apply to affected areas daily Yes Claire Barron MD       Social History     Tobacco Use    Smoking status: Current Every Day Smoker     Packs/day: 0.50     Years: 30.00     Pack years: 15.00     Types: Cigarettes    Smokeless tobacco: Never Used   Substance Use Topics    Alcohol use: Not Currently     Comment: Occasionally    Drug use: Yes     Frequency: 1.5 times per week     Types: Marijuana     Comment: 4 x a month        No Known Allergies  Past Medical History:   Diagnosis Date    Diabetes mellitus [address]     I do not know    GERD (gastroesophageal reflux disease)     Hidradenitis suppurativa     Hyperlipidemia     Hypertension     PCP Reyna Up NP, seen 10/2019    Obesity, morbid, BMI 50 or higher (HCC)     Sleep apnea     CPAP use at 16     Current Outpatient Medications   Medication Sig Dispense Refill    ibuprofen (IBU) 600 MG tablet Take 1 tablet by mouth every 6 hours as needed for Pain 30 tablet 0    tiZANidine (ZANAFLEX) 4 MG tablet take 1 tablet by mouth three times a day 90 tablet 0    acetaminophen (RA ACETAMINOPHEN EX ST) 500 MG tablet take 2 tablets by mouth every 8 hours if needed for pain 30 tablet 0    doxycycline hyclate (VIBRAMYCIN) 100 MG capsule Take 1 pill twice daily with food 60 capsule 2    benzoyl peroxide 5 % external liquid Wash face, chest and back 1-2 times daily 227 g 3    clindamycin (CLEOCIN T) 1 % lotion Apply to affected areas daily 60 mL 3     No current facility-administered medications for this visit.           PHYSICAL EXAMINATION:  [ INSTRUCTIONS:  \"[x]\" Indicates a positive item  \"[]\" Indicates a negative item  -- DELETE ALL ITEMS NOT EXAMINED]  Vital Signs: (As obtained by patient/caregiver or practitioner observation)    Blood pressure-  Heart rate-    Respiratory rate-    Temperature-  Pulse oximetry-     Constitutional: [x] Appears well-developed and well-nourished [x] No apparent distress      [] Abnormal-   Mental status  [x] Alert and awake  [x] Oriented to person/place/time [x]Able to follow commands      Eyes:  EOM    [x]  Normal  [] Abnormal-  Sclera  [x]  Normal  [] Abnormal -         Discharge []  None visible  [] Abnormal -    HENT:   [x] Normocephalic, atraumatic. [] Abnormal   [x] Mouth/Throat: Mucous membranes are moist.     External Ears [x] Normal  [] Abnormal-     Neck: [x] No visualized mass     Pulmonary/Chest: [x] Respiratory effort normal.  [x] No visualized signs of difficulty breathing or respiratory distress        [] Abnormal-      Musculoskeletal:   [x] Normal gait with no signs of ataxia         [x] Normal range of motion of neck        [] Abnormal-       Neurological:        [x] No Facial Asymmetry (Cranial nerve 7 motor function) (limited exam to video visit)          [x] No gaze palsy        [] Abnormal-         Skin:        [x] No significant exanthematous lesions or discoloration noted on facial skin         [] Abnormal-            Psychiatric:       [x] Normal Affect [x] No Hallucinations        [] Abnormal-     Other pertinent observable physical exam findings-     ASSESSMENT/PLAN:  1. Chronic bilateral low back pain without sciatica  - ibuprofen (IBU) 600 MG tablet; Take 1 tablet by mouth every 6 hours as needed for Pain  Dispense: 30 tablet; Refill: 0  - Ambulatory referral to Physical Therapy  - DME Order for [facility]) as OP    2. Pre-diabetes  - Microalbumin, Ur; Future  - Creatinine, Random Urine; Future  - Hemoglobin A1C; Future    3. Anemia, unspecified type  - CBC Auto Differential; Future    4.  Class 3 severe obesity due to excess calories without serious comorbidity with body mass index (BMI) of 50.0 to 59.9 in adult [facility])  - encouraged healthy diet and regular exercise      Cole Sheets is a 45 y.o. female being evaluated by a Virtual Visit (video visit) encounter to address concerns as mentioned above. A caregiver was present when appropriate. Due to this being a TeleHealth encounter (During BQISB-04 public health emergency), evaluation of the following organ systems was limited: Vitals/Constitutional/EENT/Resp/CV/GI//MS/Neuro/Skin/Heme-Lymph-Imm. Pursuant to the emergency declaration under the [facility], [address] [place] [address] authority and the [employer] Act, this Virtual Visit was conducted with patient's (and/or legal guardian's) consent, to reduce the patient's risk of exposure to COVID-19 and provide necessary medical care. The patient (and/or legal guardian) has also been advised to contact this office for worsening conditions or problems, and seek emergency medical treatment and/or call 911 if deemed necessary. Patient identification was verified at the start of the visit: Yes    Total time spent on this encounter: Not billed by time    Services were provided through a video synchronous discussion virtually to substitute for in-person clinic visit. Patient and provider were located at their individual homes. --JAQUELIN Joseph - CNP on 7/10/2020 at 12:29 PM    An electronic signature was used to authenticate this note.

## 2020-07-24 ENCOUNTER — HOSPITAL ENCOUNTER (OUTPATIENT)
Age: 45
Discharge: HOME | End: 2020-07-24
Payer: COMMERCIAL

## 2020-07-24 LAB
ABSOLUTE EOS #: 0.28 K/UL (ref 0–0.44)
ABSOLUTE IMMATURE GRANULOCYTE: <0.03 K/UL (ref 0–0.3)
ABSOLUTE LYMPH #: 2.24 K/UL (ref 1.1–3.7)
ABSOLUTE MONO #: 0.54 K/UL (ref 0.1–1.2)
BASOPHILS # BLD: 1 % (ref 0–2)
BASOPHILS ABSOLUTE: 0.04 K/UL (ref 0–0.2)
CREATININE URINE: 121.5 MG/DL (ref 28–217)
CREATININE URINE: 131.6 MG/DL (ref 28–217)
DIFFERENTIAL TYPE: ABNORMAL
EOSINOPHILS RELATIVE PERCENT: 3 % (ref 1–4)
ESTIMATED AVERAGE GLUCOSE: 143 MG/DL
HBA1C MFR BLD: 6.6 % (ref 4–6)
HCT VFR BLD CALC: 39.9 % (ref 36.3–47.1)
HEMOGLOBIN: 12.5 G/DL (ref 11.9–15.1)
IMMATURE GRANULOCYTES: 0 %
LYMPHOCYTES # BLD: 27 % (ref 24–43)
MCH RBC QN AUTO: 28.2 PG (ref 25.2–33.5)
MCHC RBC AUTO-ENTMCNC: 31.3 G/DL (ref 28.4–34.8)
MCV RBC AUTO: 90.1 FL (ref 82.6–102.9)
MICROALBUMIN/CREAT 24H UR: 58 MG/L
MICROALBUMIN/CREAT UR-RTO: 48 MCG/MG CREAT
MONOCYTES # BLD: 7 % (ref 3–12)
NRBC AUTOMATED: 0 PER 100 WBC
PDW BLD-RTO: 16 % (ref 11.8–14.4)
PLATELET # BLD: 267 K/UL (ref 138–453)
PLATELET ESTIMATE: ABNORMAL
PMV BLD AUTO: 12 FL (ref 8.1–13.5)
RBC # BLD: 4.43 M/UL (ref 3.95–5.11)
RBC # BLD: ABNORMAL 10*6/UL
SEG NEUTROPHILS: 62 % (ref 36–65)
SEGMENTED NEUTROPHILS ABSOLUTE COUNT: 5.25 K/UL (ref 1.5–8.1)
WBC # BLD: 8.4 K/UL (ref 3.5–11.3)
WBC # BLD: ABNORMAL 10*3/UL

## 2020-07-24 PROCEDURE — 36415 COLL VENOUS BLD VENIPUNCTURE: CPT

## 2020-07-24 PROCEDURE — 85025 COMPLETE CBC W/AUTO DIFF WBC: CPT

## 2020-07-24 PROCEDURE — 82043 UR ALBUMIN QUANTITATIVE: CPT

## 2020-07-24 PROCEDURE — 83036 HEMOGLOBIN GLYCOSYLATED A1C: CPT

## 2020-07-24 PROCEDURE — 82570 ASSAY OF URINE CREATININE: CPT

## 2020-07-27 RX ORDER — UBIQUINOL 100 MG
CAPSULE ORAL
Qty: 100 EACH | Refills: 0 | Status: SHIPPED | OUTPATIENT
Start: 2020-07-27 | End: 2021-01-19

## 2020-07-27 RX ORDER — TRAMADOL HYDROCHLORIDE 50 MG/1
50 TABLET ORAL EVERY 8 HOURS PRN
Qty: 9 TABLET | Refills: 0 | OUTPATIENT
Start: 2020-07-27 | End: 2020-07-30

## 2020-07-31 ENCOUNTER — OFFICE VISIT (OUTPATIENT)
Dept: INTERNAL MEDICINE | Age: 45
End: 2020-07-31
Payer: COMMERCIAL

## 2020-07-31 ENCOUNTER — TELEPHONE (OUTPATIENT)
Dept: INTERNAL MEDICINE | Age: 45
End: 2020-07-31

## 2020-07-31 VITALS
DIASTOLIC BLOOD PRESSURE: 75 MMHG | SYSTOLIC BLOOD PRESSURE: 118 MMHG | HEIGHT: 60 IN | TEMPERATURE: 98 F | WEIGHT: 293 LBS | BODY MASS INDEX: 57.52 KG/M2 | HEART RATE: 90 BPM

## 2020-07-31 PROCEDURE — 99214 OFFICE O/P EST MOD 30 MIN: CPT | Performed by: NURSE PRACTITIONER

## 2020-07-31 PROCEDURE — 3044F HG A1C LEVEL LT 7.0%: CPT | Performed by: NURSE PRACTITIONER

## 2020-07-31 PROCEDURE — G8427 DOCREV CUR MEDS BY ELIG CLIN: HCPCS | Performed by: NURSE PRACTITIONER

## 2020-07-31 PROCEDURE — 4004F PT TOBACCO SCREEN RCVD TLK: CPT | Performed by: NURSE PRACTITIONER

## 2020-07-31 PROCEDURE — 99211 OFF/OP EST MAY X REQ PHY/QHP: CPT | Performed by: NURSE PRACTITIONER

## 2020-07-31 PROCEDURE — 2022F DILAT RTA XM EVC RTNOPTHY: CPT | Performed by: NURSE PRACTITIONER

## 2020-07-31 PROCEDURE — G8417 CALC BMI ABV UP PARAM F/U: HCPCS | Performed by: NURSE PRACTITIONER

## 2020-07-31 RX ORDER — ACETAMINOPHEN 500 MG
TABLET ORAL
Qty: 30 TABLET | Refills: 0 | Status: SHIPPED | OUTPATIENT
Start: 2020-07-31 | End: 2020-12-07

## 2020-07-31 RX ORDER — CLINDAMYCIN PHOSPHATE 10 UG/ML
LOTION TOPICAL
Qty: 60 ML | Refills: 3 | Status: SHIPPED | OUTPATIENT
Start: 2020-07-31 | End: 2022-05-17 | Stop reason: SDUPTHER

## 2020-07-31 RX ORDER — TIZANIDINE 4 MG/1
TABLET ORAL
Qty: 90 TABLET | Refills: 0 | Status: SHIPPED | OUTPATIENT
Start: 2020-07-31 | End: 2020-09-10

## 2020-07-31 RX ORDER — DOXYCYCLINE HYCLATE 100 MG/1
CAPSULE ORAL
Qty: 60 CAPSULE | Refills: 2 | Status: SHIPPED | OUTPATIENT
Start: 2020-07-31 | End: 2021-01-19

## 2020-07-31 RX ORDER — BLOOD PRESSURE TEST KIT
KIT MISCELLANEOUS
Qty: 1 KIT | Refills: 0 | Status: SHIPPED | OUTPATIENT
Start: 2020-07-31 | End: 2021-03-01 | Stop reason: SDUPTHER

## 2020-07-31 RX ORDER — GLIPIZIDE 10 MG/1
10 TABLET ORAL
Qty: 60 TABLET | Refills: 3 | Status: SHIPPED | OUTPATIENT
Start: 2020-07-31 | End: 2021-01-19

## 2020-07-31 ASSESSMENT — ENCOUNTER SYMPTOMS
BOWEL INCONTINENCE: 0
BACK PAIN: 1
ABDOMINAL PAIN: 0

## 2020-07-31 NOTE — TELEPHONE ENCOUNTER
Lopez Gill from [address] [place] [address] called and said they received a script from [facility] for Glipizide 10mg but in the instructions it says the patient is not taking the medication. She is wanting to know if they should fill the medication or not?
She will be resuming it once it is filled.
Spoke with noelle and she was notifed
25-Feb-2017

## 2020-07-31 NOTE — PROGRESS NOTES
2020     Cassidy Wheeler (:  1975) is a 45 y.o. female, here for evaluation of the following medical concerns:    Back Pain   This is a chronic problem. The current episode started more than 1 year ago. The problem occurs constantly. The problem has been waxing and waning since onset. The pain is present in the lumbar spine and thoracic spine. The quality of the pain is described as aching. The pain does not radiate. The pain is at a severity of 7/10. The pain is moderate. The pain is the same all the time. The symptoms are aggravated by position and bending. Stiffness is present all day. Pertinent negatives include no abdominal pain, bladder incontinence, bowel incontinence, chest pain, dysuria, fever, headaches, leg pain, numbness, paresis, paresthesias, pelvic pain, perianal numbness, tingling, weakness or weight loss. Risk factors include lack of exercise and obesity. She has tried analgesics and heat for the symptoms. The treatment provided mild relief. Review of Systems   Constitutional: Negative for fever and weight loss. Cardiovascular: Negative for chest pain. Gastrointestinal: Negative for abdominal pain and bowel incontinence. Genitourinary: Negative for bladder incontinence, dysuria and pelvic pain. Musculoskeletal: Positive for back pain. Neurological: Negative for tingling, weakness, numbness, headaches and paresthesias. All other systems reviewed and are negative. Prior to Visit Medications    Medication Sig Taking?  Authorizing Provider   acetaminophen (RA ACETAMINOPHEN EX ST) 500 MG tablet take 2 tablets by mouth every 8 hours if needed for pain Yes JAQUELIN Reed CNP   benzoyl peroxide 5 % external liquid Wash face, chest and back 1-2 times daily Yes JAQUELIN Reed CNP   clindamycin (CLEOCIN T) 1 % lotion Apply to affected areas daily Yes JAQUELIN Reed CNP   doxycycline hyclate (VIBRAMYCIN) 100 MG capsule Take 1 pill twice daily with food Yes JAQUELIN Persaud CNP   tiZANidine (ZANAFLEX) 4 MG tablet take 1 tablet by mouth three times a day Yes JAQUELIN Persaud CNP   glipiZIDE (GLUCOTROL) 10 MG tablet Take 1 tablet by mouth 2 times daily (before meals) Indications: patient not currently taking Yes JAQUELIN Persaud CNP   Blood Pressure KIT Use to monitor blood pressure daily and as needed Yes JAQUELIN Persaud CNP   Alcohol Swabs (ALCOHOL PREP) 70 % PADS Use to cleanse skin as needed Yes JAQUELIN Persaud CNP   ibuprofen (IBU) 600 MG tablet Take 1 tablet by mouth every 6 hours as needed for Pain Yes JAQUELIN Persaud CNP        Social History     Tobacco Use    Smoking status: Current Every Day Smoker     Packs/day: 0.50     Years: 30.00     Pack years: 15.00     Types: Cigarettes    Smokeless tobacco: Never Used   Substance Use Topics    Alcohol use: Not Currently     Comment: Occasionally        Vitals:    07/31/20 1317   BP: 118/75   Site: Right Upper Arm   Position: Sitting   Cuff Size: Large Adult   Pulse: 90   Temp: 98 °F (36.7 °C)   TempSrc: Temporal   Weight: (!) 301 lb 9.6 oz (136.8 kg)   Height: 5' (1.524 m)     Estimated body mass index is 58.9 kg/m² as calculated from the following:    Height as of this encounter: 5' (1.524 m). Weight as of this encounter: 301 lb 9.6 oz (136.8 kg). Physical Exam  Vitals signs and nursing note reviewed. Constitutional:       General: She is not in acute distress. Appearance: Normal appearance. HENT:      Head: Normocephalic and atraumatic. Right Ear: Tympanic membrane, ear canal and external ear normal.      Left Ear: Tympanic membrane, ear canal and external ear normal.      Nose: Nose normal.      Mouth/Throat:      Mouth: Mucous membranes are moist.      Pharynx: Oropharynx is clear. Eyes:      Conjunctiva/sclera: Conjunctivae normal.   Neck:      Musculoskeletal: Normal range of motion and neck supple.    Cardiovascular:

## 2020-08-05 ENCOUNTER — HOSPITAL ENCOUNTER (OUTPATIENT)
Dept: DIABETES SERVICES | Age: 45
Setting detail: THERAPIES SERIES
Discharge: HOME | End: 2020-08-05
Payer: COMMERCIAL

## 2020-08-05 PROCEDURE — G0108 DIAB MANAGE TRN  PER INDIV: HCPCS

## 2020-08-05 SDOH — ECONOMIC STABILITY: FOOD INSECURITY: ADDITIONAL INFORMATION: NO

## 2020-08-05 ASSESSMENT — PROBLEM AREAS IN DIABETES QUESTIONNAIRE (PAID)
FEELING DEPRESSED WHEN YOU THINK ABOUT LIVING WITH DIABETES: 0
PAID-5 TOTAL SCORE: 5
FEELING THAT DIABETES IS TAKING UP TOO MUCH OF YOUR MENTAL AND PHYSICAL ENERGY EVERY DAY: 0
WORRYING ABOUT THE FUTURE AND THE POSSIBILITY OF SERIOUS COMPLICATIONS: 3
FEELING SCARED WHEN YOU THINK ABOUT LIVING WITH DIABETES: 2
COPING WITH COMPLICATIONS OF DIABETES: 0

## 2020-08-05 NOTE — LETTER
[facility]  [address]   Phone: [phone]         August 5, 2020    To Reji Trejo, Aprn - Cnp  [facility], [address]    From: Hector Gomze RN    Patient: Lelon Lesches   YOB: 1975   Date of Visit: 8/5/20     An initial assessment to determine diabetes education needs was completed on 8/5/20. Education plan includes :referred to Diabetes Educator, interpretation of lab results, blood sugar goals, complications of diabetes mellitus, hypoglycemia prevention and treatment, exercise, illness management, self-monitoring of blood glucose skills, nutrition and carbohydrate counting. An ongoing plan was created to include: follow up by video on 8/12/20. Thank you for the opportunity to provide Diabetes Self Management Education to your patient.

## 2020-08-05 NOTE — PROGRESS NOTES
This visit is a TeleHealth encounter (During AHQNR-01 public health emergency). Pursuant to the emergency declaration under the [facility] [address] and the [employer] Act, this Virtual Visit was conducted with patient's (and/or legal guardian's) consent, to reduce the patient's risk of exposure to COVID-19 and provide necessary medical care. The patient (and/or legal guardian) has also been advised to contact this office for worsening conditions or problems, and seek emergency medical treatment and/or call 911 if deemed necessary. Patient identification was verified at the start of the visit: Yes    Total time spent for this encounter: 1 hour    Services were provided through a video synchronous discussion virtually to substitute for in-person clinic visit. Patient and provider were located at their individual home/office. Also see Diabetic Screening  Patient, Haimjeff darcy Live for diabetes self-management education  visit/ assessment on 8/5/20     ( update if when return to face to face encounter)     MEDICAL HISTORY:  Past Medical History:   Diagnosis Date    Diabetes mellitus ([address]     I do not know    GERD (gastroesophageal reflux disease)     Hidradenitis suppurativa     Hyperlipidemia     Hypertension     PCP Enrique Lin NP, seen 10/2019    Obesity, morbid, BMI 50 or higher ([address]     Sleep apnea     CPAP use at 16     Family History   Problem Relation Age of Onset   Leanna [place] Vision Loss Mother     Glaucoma Mother     Diabetes Father     High Blood Pressure Father     Glaucoma Father     Kidney Cancer Maternal Grandmother     Kidney stones Brother     No Known Problems Maternal Grandfather     Diabetes Paternal Grandmother     Other Brother         stomach problem     Patient has no known allergies.    Immunization History   Administered Date(s) Administered    HPV 9-valent [facility]) 08/09/2019     Current Medications  Current Outpatient Medications   Medication Sig Dispense Refill    acetaminophen (RA ACETAMINOPHEN EX ST) 500 MG tablet take 2 tablets by mouth every 8 hours if needed for pain 30 tablet 0    benzoyl peroxide 5 % external liquid Wash face, chest and back 1-2 times daily 227 g 3    clindamycin (CLEOCIN T) 1 % lotion Apply to affected areas daily 60 mL 3    doxycycline hyclate (VIBRAMYCIN) 100 MG capsule Take 1 pill twice daily with food 60 capsule 2    tiZANidine (ZANAFLEX) 4 MG tablet take 1 tablet by mouth three times a day 90 tablet 0    glipiZIDE (GLUCOTROL) 10 MG tablet Take 1 tablet by mouth 2 times daily (before meals) Indications: patient not currently taking 60 tablet 3    Blood Pressure KIT Use to monitor blood pressure daily and as needed 1 kit 0    Alcohol Swabs (ALCOHOL PREP) 70 % PADS Use to cleanse skin as needed 100 each 0    ibuprofen (IBU) 600 MG tablet Take 1 tablet by mouth every 6 hours as needed for Pain 30 tablet 0     No current facility-administered medications for this encounter.    :     Comments:  Allergies:  No Known Allergies    A1C blood level   Lab Results   Component Value Date    LABA1C 6.6 (H) 07/24/2020    LABA1C 6.6 (H) 02/18/2020    LABA1C 6.2 (H) 09/11/2019     Lab Results   Component Value Date    LABMICR 48 (H) 07/24/2020    CREATININE 0.47 (L) 11/18/2019       Blood pressure   BP Readings from Last 3 Encounters:   07/31/20 118/75   04/26/20 (!) 165/82   02/18/20 118/74        Cholesterol  Lab Results   Component Value Date    LDLCHOLESTEROL 81 09/11/2019        Diabetes Self- Management Education Record    Participant Name: Karen Calderon  Referring Provider: JAQUELIN Amezquita CNP   Assessment/Evaluation Ratings:  1=Needs Instruction   4=Demonstrates Understanding/Competency  2=Needs Review   NC=Not Covered    3=Comprehends Key Points  N/A=Not Applicable  Topics/Learning Objectives Pre-session Assess Date:  8/5/20CB Instr. Date Reinforce Date Post- session Eval Comments   Diabetes disease process & Treatment process: Define diabetes & pre-diabetes; Identify own type of diabetes; role of the pancreas; signs/symptoms; diagnostic criteria; prevention & treatment options; contributing factors. 1    Pt states she had DM several years ago but stopped drinking pop and eating chips and lost weight and it \"went away\" but has come back due to all the weight gain  Strong family Hx with both sides. 8/5/20CB   Incorporating nutritional management into lifestyle: Describe effect of type, amount & timing of food on blood glucose; Describe basic meal planning techniques & current nutrition guideline   1 eats sausage,eggs for breakfast but then nothing until later in day, snacks on chips,pop and will cook for dinner. Likes to cook greens, potato, corn,etc.8/5/20CB    What to eat - Food groups, When to eat - timing of meals and snacks, and How much to eat - portions control. calories/ day   CHO choices/ meal   CHO choices/  day   grams of protein /day   gram of fat /day     Correctly read food labels & demonstrate CHO counting & portion control with personalized meal plan. Identify dining out strategies, & dietary sick day guidelines. 1       Incorporating physical activity into lifestyle:   Verbalize effect of exercise on blood glucose levels; benefits of regular exercise; safety considerations; contraindications; maintenance of activity. 1    Limited due to SOB and back pain from arthritis. Pt states she does have order for PT.8/5/20CB   Using medications safely:  Identify effects of diabetes medicines on blood glucose levels; List diabetes medication taken, action & side effects;    1    Glipizide 10mg 2x/d   Insulin / Injectable - Appropriate injection sites; proper storage; supplies needed; proper technique; safe needle disposal guidelines.    1    NA does NOT like needles   Monitoring blood glucose, interpreting and using results:  Identify recommended & personal blood glucose targets; importance of testing; testing supplies; HgbA1C target levels; Factors affecting blood glucose; Importance of logging blood glucose levels for pattern recognition; ketone testing; safe lancet disposal.   1    Won't test, did in past and fingers hurt and became hard. Briefly discussed A1C 6.6% and pt informed that A1C>6.5% is T2DM.  8/5/20CB   Prevention, detection & treatment of acute complications:  Identify symptoms of hyper & hypoglycemia, and prevention & treatment strategies. 1    May have had low BG in past as she felt sweaty. 8/5/20CB   Describe sick day guidelines & indications for  physician notification. Identify short term consequences of poor control. Disaster preparedness strategies    1       Prevention, detection & treatment of chronic complications:  Define the natural course of diabetes & describe the relationship of blood glucose levels to long term complications of diabetes. Identify preventative measures & standards of care. 1    Has not seen dentist but needs to go as dentures don't fit, has not been to eye Dr and having problems with feet. Refuses vaccines, does not like needles, has Mirena IUD in place but bleeding again so needs to get that checked out. .8/5/20CB   Developing strategies to address psychosocial issues:  Describe feelings about living with diabetes; Describe how stress, depression & anxiety affect blood glucose; Identify coping strategies; Identify support needed & support network available. 1    Wants to lose weight and fearful of complications as she has seen problems with grandparents and DM. However she kept asking when class was done. Has family support with grown kids and helps to watch grandkids. 8/5/20CB   Developing strategies to promote health/change behavior: Identify 7 self-care behaviors; Personal health risk factors;  Benefits, challenges & strategies for behavioral change;    1    Still smokes. 8/5/20CB     Individualized goal selection. My goal , to help me improve my health, I will:   1.try to cut back on pop and drink more water      2.try not to skip meals and watch portion size       Plan  Follow-up Appointments planned 8/12/20 @ 1:30p     Instruction Method: [x]Lecture/Discussion  []Power Point Presentation  [x]Handouts  []Return Demonstration    Education Materials/Equipment Provided (VIA Mail for phone visits)  :    [x]Self-Management - Initial assessment - Enrolment in to [facility]  Where do I Begin, Living with Type 2 diabetes ADA home support program and  handout on diabetes education classes. 8/5/20CB   Mailed out Class 1 and 2 packet today 8/5/20    []Self-Management  Class 1 -Self-Management  Class 1 - \"Diabetes - your take control guide\" - ADA booklet -  o  \"ready, set, start counting\" teaching sheet in diet section   o  GLP-1 understanding 8 core deficits puzzle sheet in the medication section  o one day food diary and envelope for return of diet HX   o  You tube and website resource sheet-Understanding Type 2 Diabetes from Animated Diabetes Patient https://[email or web address] be/[email or web address]      [] Self-Management  Class 2 - Meal Plan and handout for serving sizes, smarter snacking, Ready Set Carb Counting / Plate Method, Nutrient Conversion and International Diabetes [address] Eating for People with Diabetes and Nutrition in the [employer] - fast facts about fast food    [] Self-Management  Class 3 -  Diabetes your take control guide pages 20 - 30,  type 2 diabetes and the role of GLP- 1,  Individualized Diabetes report card     [] Self-Management Class 4 - BD Booklet  Sick Day Rules and  Dinning Out Guide , recipe hand outs and tips, diabetes Cookbooks  ( when available)     []Self-Management - 3 month follow -  AADE7 Self care behaviors work sheets, 2020 [employer],  Online resource list - March 2020      []Self-Management  Gestational - RN class -Resource materials sent out : care booklet - \" Gestational Diabetes Mellitus ( GDM) toolkit form [place] gestational diabetes postpartum care learning collaborative 2018. \"Simple Guidelines for meal planning with gestational diabetes. SMBG sheets to fax back to MFM weekly. BD  healthy injection site selection and rotation with 6 mm insulin syringe and 4 mm pen needle. Gestational diabetes handout from [facility] 2016. Did you have gestational diabetes when you were pregnant? Handout from [facility]  April 2014    []Self-Management Gestational - RD class - My Food Plan for Gestational diabetes    []Glucose Meter     []Insulin Kit     []Other      Encounter Type Date Start Time End Time Comments No Show Dates   Assessment 8/5/20CB   1:45 2:45   []In Person  [x]Telephone per doxy    Class 1 - Understanding diabetes     []TelephoneAmerican Diabetes Association  www. diabetes. org    Class 2- Nutrition and diabetes      []Telephone  Healthy Eating with Diabetes- Automatic Data of Diabetes and Digestive and Kidney   https://youtu. be/[email or web address]    Class 3 - Preventing Complications     []Telephone    Class 4 -  In depth Nutrition and sick day care    []Telephone  Diabetes Food hub  www. diabetesfoodhub.org     Class 5 - 3 month follow up / goal reassessment        Gestational - RN         Gestational - RD        Individual MNT         Shared Med Appt         Yearly Follow-up        Meter Instrx      How to Measure Your Blood Sugar - [facility] Patient Education  https://[email or web address] be/[email or web address]    Insulin Instrx      []Pen  []Vial & Syringe   BD Diabetes Care: How to Inject Insulin with a Pen Needle  https://youtu. be/[email or web address]    Diabetes Care: How to Inject Insulin with a Syringe  https://[email or web address] be/[email or web address]-[email or web address]       DSMS Support :   [] MNT      [] Annual update     [] Starting Fresh  adults living with diabetes or pre diabetes.  [address] [place], [place] [id number]-7:30pm- on 6 week rotation  Free -  REGISTRATION IS REQUIRED - QJBC [phone] call for dates    []  Diabetes Group at  [place] [address] 6 week diabetes education support   classes - contact : [address] [facility] [phone]  for dates and locations      []ADA  Where do I Begin, Living with Type 2 diabetes ADA home support program    Web site: diabetes. org/living    Call: 1800 DIABETES  e-mail: Teodoro@[email or web address]. org     []  Internet web sites - ADAWeb site: www.diabetes. org       [] [facility] opens at 8 30 am daily for walkers, shops open at 10 am   [address] [facility], [facility]   [phone] Daily - 8:30am - 10am    3rd Tuesday of every month [address] [facility] expert speakers visit from 9 - 10am        [] [facility], [facility], [facility], [place], [facility] (site rotate)  Call [phone] or visit   website: [email or web address] for more details   Check web site for updated times/ dates       [] [address]  [address] [facility], [address] [place] [address] South Tuesday and Thursday -   9 :30 am- 10:30am - ongoing   [] [address]  [address] [place] [address], [address] [facility] [address] [address] [facility] Thursday 11:00am - 11:45am     [] Third Wednesday Cooking  Class  Free  Registration is required     [address] [facility]. [address] [facility], [address] [facility] [phone] or   Email [email or web address]   Wednesdays-5:00- 6:00 pm       [] Eat Smart  Be Active & Learn How - Free   Families & grandparents with children in home 0- 25 & pregnant moms          Various sites in community - call to find next session near you.   [facility] extension office for future sessions - Marsha Chaney  Email : [email or web address]  Phone: [phone]     [] (SNAP-Ed)   - free nutrition education   - adults and youth, who are eligible for the Supplemental Nutrition Assistance Program    Various sites in community - call to find next session near you. [facility]- SNAP-Ed  contact Shala Anderson, Email:[email or web address]           Post Education Referrals:      [] [place] Tobacco Quit information sheet and [address] [facility] , [address]       [] Dental care - [facility]     [] [facility] ([facility]) link  phone number - for information and referral to [facility] SERGIO CARREON  Clinically  [address], WEIGHT MANAGEMENT        []Other  Clint Gómez, RN

## 2020-08-12 ENCOUNTER — HOSPITAL ENCOUNTER (OUTPATIENT)
Dept: DIABETES SERVICES | Age: 45
Setting detail: THERAPIES SERIES
Discharge: HOME | End: 2020-08-12
Payer: COMMERCIAL

## 2020-08-12 PROCEDURE — G0108 DIAB MANAGE TRN  PER INDIV: HCPCS

## 2020-08-12 NOTE — PROGRESS NOTES
This visit is a TeleHealth encounter (During DJDIQ-64 public health emergency). Pursuant to the emergency declaration under the [facility], [address] [place] [address] authority and the [employer] Act, this Virtual Visit was conducted with patient's (and/or legal guardian's) consent, to reduce the patient's risk of exposure to COVID-19 and provide necessary medical care. The patient (and/or legal guardian) has also been advised to contact this office for worsening conditions or problems, and seek emergency medical treatment and/or call 911 if deemed necessary. Patient identification was verified at the start of the visit: Yes    Total time spent for this encounter: 1 hour   8/12/20CB 1 hour per Doxy video    Services were provided through a video synchronous discussion virtually to substitute for in-person clinic visit. Patient and provider were located at their individual home/office. Also see Diabetic Screening  Patient, darcy Quinn for diabetes self-management education  visit/ assessment on 8/5/20     ( update if when return to face to face encounter)     MEDICAL HISTORY:  Past Medical History:   Diagnosis Date    Diabetes mellitus ([address]     I do not know    GERD (gastroesophageal reflux disease)     Hidradenitis suppurativa     Hyperlipidemia     Hypertension     PCP Gearold Lennox NP, seen 10/2019    Obesity, morbid, BMI 50 or higher ([address]     Sleep apnea     CPAP use at 16     Family History   Problem Relation Age of Onset   Aura Reza Vision Loss Mother     Glaucoma Mother     Diabetes Father     High Blood Pressure Father     Glaucoma Father     Kidney Cancer Maternal Grandmother     Kidney stones Brother     No Known Problems Maternal Grandfather     Diabetes Paternal Grandmother     Other Brother         stomach problem     Patient has no known allergies.    Immunization History   Administered Date(s) Administered    HPV 9-valent Marcellus Bilis) 08/09/2019     Current Medications  Current Outpatient Medications   Medication Sig Dispense Refill    acetaminophen (RA ACETAMINOPHEN EX ST) 500 MG tablet take 2 tablets by mouth every 8 hours if needed for pain 30 tablet 0    benzoyl peroxide 5 % external liquid Wash face, chest and back 1-2 times daily 227 g 3    clindamycin (CLEOCIN T) 1 % lotion Apply to affected areas daily 60 mL 3    doxycycline hyclate (VIBRAMYCIN) 100 MG capsule Take 1 pill twice daily with food 60 capsule 2    tiZANidine (ZANAFLEX) 4 MG tablet take 1 tablet by mouth three times a day 90 tablet 0    glipiZIDE (GLUCOTROL) 10 MG tablet Take 1 tablet by mouth 2 times daily (before meals) Indications: patient not currently taking 60 tablet 3    Blood Pressure KIT Use to monitor blood pressure daily and as needed 1 kit 0    Alcohol Swabs (ALCOHOL PREP) 70 % PADS Use to cleanse skin as needed 100 each 0    ibuprofen (IBU) 600 MG tablet Take 1 tablet by mouth every 6 hours as needed for Pain 30 tablet 0     No current facility-administered medications for this encounter.    :     Comments:  Allergies:  No Known Allergies    A1C blood level   Lab Results   Component Value Date    LABA1C 6.6 (H) 07/24/2020    LABA1C 6.6 (H) 02/18/2020    LABA1C 6.2 (H) 09/11/2019     Lab Results   Component Value Date    LABMICR 48 (H) 07/24/2020    CREATININE 0.47 (L) 11/18/2019       Blood pressure   BP Readings from Last 3 Encounters:   07/31/20 118/75   04/26/20 (!) 165/82   02/18/20 118/74        Cholesterol  Lab Results   Component Value Date    LDLCHOLESTEROL 81 09/11/2019        Diabetes Self- Management Education Record    Participant Name: Katherine Stephenson  Referring Provider: JAQUELIN Persaud CNP   Assessment/Evaluation Ratings:  1=Needs Instruction   4=Demonstrates Understanding/Competency  2=Needs Review   NC=Not Covered    3=Comprehends Key Points  N/A=Not Applicable  Topics/Learning Objectives Pre-session Assess Date:  8/5/20CB Instr. Date Reinforce Date Post- session Eval Comments   Diabetes disease process & Treatment process: Define diabetes & pre-diabetes; Identify own type of diabetes; role of the pancreas; signs/symptoms; diagnostic criteria; prevention & treatment options; contributing factors. 1 8/12/20CB   Pt states she had DM several years ago but stopped drinking pop and eating chips and lost weight and it \"went away\" but has come back due to all the weight gain  Strong family Hx with both sides. 8/5/20CB    Pt did not receive Class 1 and 2 packet yet but did receive Pre DM with [employer] resources. Able to share screen for patient to watch [employer] \"Understanding T2 DM\"     Incorporating nutritional management into lifestyle: Describe effect of type, amount & timing of food on blood glucose; Describe basic meal planning techniques & current nutrition guideline   1 eats sausage,eggs for breakfast but then nothing until later in day, snacks on chips,pop and will cook for dinner. Likes to cook greens, potato, corn,etc.8/5/20CB    What to eat - Food groups, When to eat - timing of meals and snacks, and How much to eat - portions control. calories/ day   CHO choices/ meal   CHO choices/  day   grams of protein /day   gram of fat /day     Correctly read food labels & demonstrate CHO counting & portion control with personalized meal plan. Identify dining out strategies, & dietary sick day guidelines. 1       Incorporating physical activity into lifestyle:   Verbalize effect of exercise on blood glucose levels; benefits of regular exercise; safety considerations; contraindications; maintenance of activity. 1 8/12/20CB   Limited due to SOB and back pain from arthritis. Pt states she does have order for PT.8/5/20CB  Pt having so much back pain and waiting to get into PT. Became tearful lbecause unable to do the things she once did and has gained all her weight back. 8/12/20CB   Using feelings about living with diabetes; Describe how stress, depression & anxiety affect blood glucose; Identify coping strategies; Identify support needed & support network available. 1 8/12/20CB   Wants to lose weight and fearful of complications as she has seen problems with grandparents and DM. However she kept asking when class was done. Has family support with grown kids and helps to watch grandkids. 8/5/20CB    Pt tearful today dealing with back pain and trying to eat healthy. Does not have much appetite so tends to skip meals. Her mom always said not to eat after 5p so tries to do lunch /dinner together sometimes    Developing strategies to promote health/change behavior: Identify 7 self-care behaviors; Personal health risk factors; Benefits, challenges & strategies for behavioral change;    1    Still smokes. 8/5/20CB     Individualized goal selection. My goal , to help me improve my health, I will:   1.try to cut back on pop and drink more water      2.try not to skip meals and watch portion size       Plan  Follow-up Appointments planned 8/12/20 @ 1:30p     Instruction Method: [x]Lecture/Discussion  []Power Point Presentation  [x]Handouts  []Return Demonstration    Education Materials/Equipment Provided (VIA Mail for phone visits)  :    [x]Self-Management - Initial assessment - Enrolment in to [facility]  Where do I Begin, Living with Type 2 diabetes ADA home support program and  handout on diabetes education classes.  8/5/20CB   Mailed out Class 1 and 2 packet today 8/5/20    [x]Self-Management  Class 1 -Self-Management  Class 1 - \"Diabetes - your take control guide\" - ADA booklet -8/12/20CB  o  \"ready, set, start counting\" teaching sheet in diet section   o  GLP-1 understanding 8 core deficits puzzle sheet in the medication section  o one day food diary and envelope for return of diet HX   o  You tube and website resource sheet-Understanding Type 2 Diabetes from Animated Diabetes Patient 3 - Preventing Complications     []Telephone    Class 4 -  In depth Nutrition and sick day care    []Telephone  Diabetes Food hub  www. diabetesfoodhub.org     Class 5 - 3 month follow up / goal reassessment        Gestational - RN         Gestational - RD        Individual MNT         Shared Med Appt         Yearly Follow-up        Meter Instrx      How to Measure Your Blood Sugar - [facility] Patient Education  [email or web address]://[email or web address] be/[email or web address]    Insulin Instrx      []Pen  []Vial & Syringe   BD Diabetes Care: How to Inject Insulin with a Pen Needle  [email or web address]://[email or web address] [email or web address]    Diabetes Care: How to Inject Insulin with a Syringe  [email or web address]://[email or web address] be/[email or web address]       DSMS Support :   [] MNT      [] Annual update     [] Starting Fresh  adults living with diabetes or pre diabetes. [address] [place], [place]    [id number]-4:30pm- on 6 week rotation  Free -  REGISTRATION IS REQUIRED - Health system [phone] call for dates    []  Diabetes Group at  [address] - Free 6 week diabetes education support   classes - contact : Charity Cuello [phone]  for dates and locations      []ADA  Where do I Begin, Living with Type 2 diabetes ADA home support program    Web site: diabetes. org/living    Call: 1800 DIABETES  e-mail: [email or web address] org     []  Internet web sites - ADAWeb site: www.diabetes. org       [] [facility] opens at 8 30 am daily for walkers, shops open at 10 am   [address] [place] [address], [address] [place] [address]   [phone] Daily - 8:30am - 10am    3rd Tuesday of every month Anai Ortega expert speakers visit from 9 - 10am        [] [address] [place] [address], [facility], [facility], [place], [facility] (site rotate)  Call [phone] or visit   website: [email or web address] for more details   Check web site for updated times/ dates       [] [employer] Exercise Classes  Dereck Chi  Free class   [facility]  [address] [facility]  [place], [address] Tuesday and Thursday -   9 :30 am- 10:30am - ongoing   [] [address] classes   [facility]  [address] [facility], [address] [address] [facility] Thursday 11:00am - 11:45am     [] Third Wednesday Cooking  Class  Free  Registration is required     [facility]. [address] [facility], [facility] [phone] or   Email [email or web address]   Wednesdays-5:00- 6:00 pm       [] Eat Smart  Be Active & Learn How - Free   Families & grandparents with children in home 0- 25 & pregnant moms          Various sites in community - call to find next session near you. [facility] extension office for future sessions - Joby Parada  Email : [email or web address]  Phone: [phone]     [] (SNAP-Ed)   - free nutrition education   - adults and youth, who are eligible for the Supplemental Nutrition Assistance Program    Various sites in community - call to find next session near you. [facility] NICOLE-CRANBERRY-TRENA SNAP-Ed  contact Marii Thrasher, Email:[email or web address]           Post Education Referrals:      [] [place] Tobacco Quit information sheet and [address]       [] Dental care - [facility]     [] [facility]) link  phone number - for information and referral to [facility] SERGIO CARREON  Clinically  [address], WEIGHT MANAGEMENT        []Other  Cammie Ruiz, RN

## 2020-08-21 ENCOUNTER — HOSPITAL ENCOUNTER (OUTPATIENT)
Dept: DIABETES SERVICES | Age: 45
Setting detail: THERAPIES SERIES
End: 2020-08-21
Payer: COMMERCIAL

## 2020-09-09 NOTE — TELEPHONE ENCOUNTER
Marc request for pt requesting medication refill, tizanidine    Next Visit Date:11/3/20  Future Appointments   Date Time Provider [place]   9/10/2020  3:00 PM Jenny Bañuelos LD [address] ED [facility]   10/13/2020  9:00 AM Solo Lucero MD [facility] OB/Gyn [id number]   11/3/2020  1:40 PM Jasvir Menon APRN - CNP [facility] IM [address] Maintenance   Topic Date Due    Pneumococcal 0-64 years Vaccine (1 of 1 - PPSV23) 11/17/1983    Diabetic foot exam  11/17/1987    Diabetic retinal exam  11/17/1987    Hepatitis B vaccine (1 of 3 - Risk 3-dose series) 11/17/1996    DTaP/Tdap/Td vaccine (1 - Tdap) 11/17/1996    Flu vaccine (1) 09/01/2020    Lipid screen  09/11/2020    A1C test (Diabetic or Prediabetic)  07/24/2021    Diabetic microalbuminuria test  07/24/2021    Cervical cancer screen  04/07/2022    HIV screen  Completed    Hepatitis A vaccine  Aged Out    Hib vaccine  Aged Out    Meningococcal (ACWY) vaccine  Aged Out             (applicable per patient's age: Cancer Screenings, Depression Screening, Fall Risk Screening, Immunizations)    Hemoglobin A1C (%)   Date Value   07/24/2020 6.6 (H)   02/18/2020 6.6 (H)   09/11/2019 6.2 (H)     Microalb/Crt.  Ratio (mcg/mg creat)   Date Value   07/24/2020 48 (H)     LDL Cholesterol (mg/dL)   Date Value   09/11/2019 81     AST (U/L)   Date Value   05/11/2018 11     ALT (U/L)   Date Value   11/14/2019 15     BUN (mg/dL)   Date Value   11/14/2019 7      (goal A1C is < 7)   (goal LDL is <100) need 30-50% reduction from baseline     BP Readings from Last 3 Encounters:   07/31/20 118/75   04/26/20 (!) 165/82   02/18/20 118/74    (goal /80)      All Future Testing planned in [facility]:  Lab Frequency Next Occurrence            Patient Active Problem List:     Hyperlipidemia     Hypertension     Diabetes mellitus (HCC)     GERD (gastroesophageal reflux disease)     Obesity, morbid, BMI 50 or higher (HCC)     Hidradenitis suppurativa     Sleep apnea     Perirectal abscess     Iron deficiency anemia     Dysmenorrhea     Menorrhagia     Acute cystitis without hematuria     Abnormal uterine bleeding (AUB)     Hysteroscopy, D&C, Myosure, Mirena IUD Insertion 11/18/19

## 2020-09-10 RX ORDER — TIZANIDINE 4 MG/1
TABLET ORAL
Qty: 90 TABLET | Refills: 0 | Status: SHIPPED | OUTPATIENT
Start: 2020-09-10 | End: 2020-11-24

## 2020-10-13 ENCOUNTER — HOSPITAL ENCOUNTER (OUTPATIENT)
Age: 45
Setting detail: SPECIMEN
Discharge: HOME | End: 2020-10-13
Payer: COMMERCIAL

## 2020-10-13 ENCOUNTER — OFFICE VISIT (OUTPATIENT)
Dept: OBGYN | Age: 45
End: 2020-10-13
Payer: COMMERCIAL

## 2020-10-13 VITALS
DIASTOLIC BLOOD PRESSURE: 75 MMHG | WEIGHT: 293 LBS | BODY MASS INDEX: 60.54 KG/M2 | HEART RATE: 73 BPM | SYSTOLIC BLOOD PRESSURE: 123 MMHG

## 2020-10-13 PROCEDURE — 99396 PREV VISIT EST AGE 40-64: CPT | Performed by: OBSTETRICS & GYNECOLOGY

## 2020-10-13 PROCEDURE — G8484 FLU IMMUNIZE NO ADMIN: HCPCS | Performed by: OBSTETRICS & GYNECOLOGY

## 2020-10-13 PROCEDURE — 99211 OFF/OP EST MAY X REQ PHY/QHP: CPT

## 2020-10-13 RX ORDER — ESTRADIOL 2 MG/1
2 TABLET ORAL DAILY
Qty: 21 TABLET | Refills: 0 | Status: SHIPPED | OUTPATIENT
Start: 2020-10-13 | End: 2021-03-01

## 2020-10-13 RX ORDER — DOCUSATE SODIUM 100 MG/1
100 CAPSULE, LIQUID FILLED ORAL 2 TIMES DAILY
Qty: 60 CAPSULE | Refills: 3 | Status: SHIPPED | OUTPATIENT
Start: 2020-10-13 | End: 2020-11-12

## 2020-10-13 NOTE — PROGRESS NOTES
Brittany Nj  10/13/2020              45 y.o. Chief Complaint   Patient presents with    Annual Exam       Patient's last menstrual period was 10/09/2020 (exact date). Primary Care Physician: JAQUELIN Mckinney CNP    The patient was seen and examined. She has a chief complaint today and is here for her annual exam.  Her bowels are not regular. There are no voiding complaints. She denies any bloating. She denies vaginal discharge and was counseled on STD's and the need for barrier contraception. HPI : Brittany Nj is a 45 y.o. female [id number] she reports spotting about 2 weeks each month since Mirena IUD was inserted 2019 to treat heavy menses. Pt admits bleeding is much light with the IUD. The patient reports \" a boil \" that developed in the right groin area 2 days ago . There is a hx of HS that is being managed by dermatology with Doxycycline and topical clindamycin. She reports constipation.  BM 1 -2 times weekly    Pt requests testing for GC and chlamydia.     ________________________________________________________________________  OB History    Para Term  AB Living   5 3 3 0 2 4   SAB TAB Ectopic Molar Multiple Live Births   1 1 0 0 1 4      # Outcome Date GA Lbr Mckinley/2nd Weight Sex Delivery Anes PTL Lv   5 Term      Vag-Spont   ARNOLDO   4 SAB            3 Term 18    F Vag-Spont   ARNOLDO   2 TAB            1A Term      Vag-Spont   ARNOLDO   1B Term      Vag-Spont   ARNOLDO     Past Medical History:   Diagnosis Date    Diabetes mellitus ([address]     I do not know    GERD (gastroesophageal reflux disease)     Hidradenitis suppurativa     Hyperlipidemia     Hypertension     PCP Victor Hugo Castro NP, seen 10/2019    Obesity, morbid, BMI 50 or higher ([address]     Sleep apnea     CPAP use at 16                                                                   Past Surgical History:   Procedure Laterality Date    ABSCESS DRAINAGE  2016    BUTTOCK  DILATION AND CURETTAGE OF UTERUS N/A 11/18/2019    DILATATION AND CURETTAGE HYSTEROSCOPY, Myriam Mend, MIRENA IUD INSERTION performed by Lennie Skelton MD at [facility] HYSTEROSCOPY  11/18/2019     DILATATION AND CURETTAGE HYSTEROSCOPY, MYOSURE, MIRENA IUD INSERTION     SD RECTUM SURGERY PROCEDURE UNLISTED N/A 7/2/2018    RECTAL PERIRECTAL INCISION AND DRAINAGE performed by Matthew Hardy DO at [facility]     Family History   Problem Relation Age of Onset   Carley Barragan Vision Loss Mother     Glaucoma Mother     Diabetes Father     High Blood Pressure Father     Glaucoma Father     Kidney Cancer Maternal Grandmother     Kidney stones Brother     No Known Problems Maternal Grandfather     Diabetes Paternal Grandmother     Other Brother         stomach problem     Social History     Socioeconomic History    Marital status: Single     Spouse name: Not on file    Number of children: 4    Years of education: 8th grade    Highest education level: Not on file   Occupational History    Occupation: unemployed     Comment: working on "[employer] [address] resource strain: Not on file    Food insecurity     Worry: Not on file     Inability: Not on file   [employer] needs     Medical: Not on file     Non-medical: Not on file   Tobacco Use    Smoking status: Current Every Day Smoker     Packs/day: 0.50     Years: 30.00     Pack years: 15.00     Types: Cigarettes    Smokeless tobacco: Never Used   Substance and Sexual Activity    Alcohol use: Not Currently     Comment: Occasionally    Drug use: Yes     Frequency: 1.5 times per week     Types: Marijuana     Comment: 4 x a month    Sexual activity: Yes     Partners: Male     Comment: 1 partner boyfriend   Lifestyle    Physical activity     Days per week: Not on file     Minutes per session: Not on file    Stress: Not on file   Relationships    Social connections     Talks on phone: Not on file     Gets together: Not on file Attends Alevism service: Not on file     Active member of club or organization: Not on file     Attends meetings of clubs or organizations: Not on file     Relationship status: Not on file    Intimate partner violence     Fear of current or ex partner: Not on file     Emotionally abused: Not on file     Physically abused: Not on file     Forced sexual activity: Not on file   Other Topics Concern    Not on file   Social History Narrative    Lives with 4 kids age 22yo to 32year old       MEDICATIONS:  Current Outpatient Medications on File Prior to Visit   Medication Sig Dispense Refill    tiZANidine (ZANAFLEX) 4 MG tablet take 1 tablet by mouth three times a day 90 tablet 0    acetaminophen (RA ACETAMINOPHEN EX ST) 500 MG tablet take 2 tablets by mouth every 8 hours if needed for pain 30 tablet 0    benzoyl peroxide 5 % external liquid Wash face, chest and back 1-2 times daily 227 g 3    clindamycin (CLEOCIN T) 1 % lotion Apply to affected areas daily 60 mL 3    doxycycline hyclate (VIBRAMYCIN) 100 MG capsule Take 1 pill twice daily with food 60 capsule 2    glipiZIDE (GLUCOTROL) 10 MG tablet Take 1 tablet by mouth 2 times daily (before meals) Indications: patient not currently taking 60 tablet 3    Alcohol Swabs (ALCOHOL PREP) 70 % PADS Use to cleanse skin as needed 100 each 0    ibuprofen (IBU) 600 MG tablet Take 1 tablet by mouth every 6 hours as needed for Pain 30 tablet 0    Blood Pressure KIT Use to monitor blood pressure daily and as needed (Patient not taking: Reported on 10/13/2020) 1 kit 0     No current facility-administered medications on file prior to visit. ALLERGIES:  Allergies as of 10/13/2020    (No Known Allergies)     Immunization status: not up to date       Gynecologic History:    Menarche: age 10     Patient's last menstrual period was 10/09/2020 (exact date).     Sexually Active: Yes    STD History: Yes , GC and chlamydia     Permanent Sterilization: Yes , BTL Reversible Birth Control: No        Hormone Replacement Exposure: No      Genetic Qualified Family History of Breast, Ovarian , Colon or Uterine Cancer: No     If YES see scanned worksheet. Preventative Health Testing:  Date of Last Pap Smear: 2017 neg co-testing  Abnormal Pap Smear History: no  Colposcopy History: na  Date of Last Mammogram: 8/2020  Date of Last Colonoscopy: na  Date of Last Bone Density:      ________________________________________________________________________  REVIEW OF SYSTEMS:    A minimum of an eleven point review of systems was completed. Review Of Systems (11 point):  Constitutional: No fever, chills or malaise; No weight change or fatigue  Head and Eyes: No vision, Headache, Dizziness or trauma in last 12 months  ENT ROS: No hearing, Tinnitis, sinus or taste problems  Hematological and Lymphatic ROS:No Lymphoma, Von Willebrand's, Hemophillia or Bleeding History  Psych ROS: No Depression, Homicidal thoughts,suicidal thoughts, or anxiety  Breast ROS: No prior breast abnormalities or lumps  Respiratory ROS: No SOB, Pneumoniae,Cough, or Pulmonary Embolism History  Cardiovascular ROS: No Chest Pain with Exertion, Palpitations, Syncope, Edema, Arrhythmia  Gastrointestinal ROS: No Indigestion, Heartburn, Nausea, vomiting, Diarrhea, positive Constipation, ; No Bloody Stools or melena  Genito-Urinary ROS: No Dysuria, Hematuria or Nocturia.  No Urinary Incontinence or Vaginal Discharge  Musculoskeletal ROS: positive lower back pain,  NO Arthritis,Gout,Osteoporosis or Rheumatism  Neurological ROS: No CVA, Migraines, Epilepsy, Seizure Hx, or Limb Weakness  Dermatological ROS: positive Rash No Itching, Hives, Mole Changes or Cancer                                                                                                                                                                                                                                  PHYSICAL Exam: Constitutional:  Blood pressure 123/75, pulse 73, weight (!) 310 lb (140.6 kg), last menstrual period 10/09/2020, not currently breastfeeding. General Appearance: This  is a well Developed, well Nourished, well groomed female. Her BMI was reviewed. Nutritional decision making was discussed. Skin:  Follicular pattern rash breast and abdomen. 2 cm nodular lesion right groin consistent with HS          Lymphatic:  No Lymph Nodes were Palpable in the neck , axilla or groin. Neck and EENT:  The neck was supple. There were no masses   The thyroid was not enlarged and had no masses. Throat inspected-No exudates or Masses, Nares Patent No Masses        Respiratory: The lungs were auscultated and found to be clear. There were no rales, rhonchi or wheezes. There was a good respiratory effort. Cardiovascular: The heart was in a regular rate and rhythm. . No S3 or S4. There was no murmur appreciated. Extremities: The patients extremities were without calf tenderness, edema, or varicosities. Abdomen: The abdomen was soft and non-tender. There were good bowel sounds in all quadrants and there was no guarding, rebound or rigidity. On evaluation there was no evidence of hepatosplenomegaly and there was no costal vertebral lamont tenderness bilaterally. No hernias were appreciated. Psych: The patient had a normal Orientation to: Time, Place, Person, and Situation  There is no Mood / Affect changes    Breast:  (Chest)  No nipple retraction or dimpling, No nipple discharge or bleeding, No axillary or supraclavicular adenopathy, Normal to palpation without dominant masses      Pelvic Exam:  Vulva normal without lesions  Vagina normal   Cervix without visible lesions, cervix was only visible with the aid of an extra long speculum and the patient holding her thighs in the lithotomy position.  The IUD string was visible  Uterus non tender freely movable not  Grossly enlarged  Adnexa not palpable      Rectovaginal exam: deferred      Musculosk:  Normal Gait and station was noted. Digits were evaluated without abnormal findings. ASSESSMENT:      45 y.o. Annual   Diagnosis Orders   1. Well woman exam with routine gynecological exam     2. Breakthrough bleeding associated with intrauterine device (IUD)  estradiol (ESTRACE) 2 MG tablet   3. Screening mammogram for high-risk patient  ESTEBAN DIGITAL SCREEN W OR WO CAD BILATERAL   4. Screen for STD (sexually transmitted disease)  Chlamydia Trachomatis & Neisseria gonorrhoeae (GC) by amplified detection    Vaginitis DNA Probe   5. Hidradenitis suppurativa     6. Slow transit constipation  docusate sodium (COLACE) 100 MG capsule          Chief Complaint   Patient presents with    Annual Exam          Past Medical History:   Diagnosis Date    Diabetes mellitus [address]     I do not know    GERD (gastroesophageal reflux disease)     Hidradenitis suppurativa     Hyperlipidemia     Hypertension     PCP Mesfin Rodrigez NP, seen 10/2019    Obesity, morbid, BMI 50 or higher ([facility]     Sleep apnea     CPAP use at 16         Patient Active Problem List   Diagnosis    Hyperlipidemia    Hypertension    Diabetes mellitus ([facility]    GERD (gastroesophageal reflux disease)    Obesity, morbid, BMI 50 or higher (HCC)    Hidradenitis suppurativa    Sleep apnea    Perirectal abscess    Iron deficiency anemia    Dysmenorrhea    Menorrhagia    Acute cystitis without hematuria    Abnormal uterine bleeding (AUB)    Hysteroscopy, D&C, Myosure, Mirena IUD Insertion 11/18/19          Hereditary Breast, Ovarian, Colon and Uterine Cancer screening Done. Tobacco & Secondary smoke risks reviewed; instructed on cessation and avoidance      Counseling Completed: Refer to plan           PLAN:  1. Well woman exam with routine gynecological exam      2. Breakthrough bleeding associated with intrauterine device (IUD)    - estradiol (ESTRACE) 2 MG tablet;  Take 1 tablet by mouth daily  Dispense: 21 tablet; Refill: 0    3. Screening mammogram for high-risk patient    - ESTEBAN DIGITAL SCREEN W OR WO CAD BILATERAL; Future    4. Screen for STD (sexually transmitted disease)    - Chlamydia Trachomatis & Neisseria gonorrhoeae (GC) by amplified detection; Future  - Vaginitis DNA Probe; Future    5. Hidradenitis suppurativa  - warm compresses to groin lesion  - continue doxycycline  - follow up with derm    6. Slow transit constipation    - docusate sodium (COLACE) 100 MG capsule; Take 1 capsule by mouth 2 times daily  Dispense: 60 capsule; Refill: 3      Return in about 1 year (around 10/13/2021) for Annual exam.  Follow up Pap as per American Society for Colposcopy and Cervical Pathology guidelines. Mammograms start age 36  STD counseling and prevention reviewed. Routine health maintenance per patients PCP.   Orders Placed This Encounter   Procedures    Chlamydia Trachomatis & Neisseria gonorrhoeae (GC) by amplified detection     Standing Status:   Future     Standing Expiration Date:   10/13/2021    Vaginitis DNA Probe     Standing Status:   Future     Standing Expiration Date:   10/13/2021    ESTEBAN DIGITAL SCREEN W OR WO CAD BILATERAL     Standing Status:   Future     Standing Expiration Date:   10/13/2021     Scheduling Instructions:      Every year for women age 36 and under 76     Order Specific Question:   Reason for exam:     Answer:   Screen for breast cancer

## 2020-10-14 LAB
DIRECT EXAM: ABNORMAL
Lab: ABNORMAL
SPECIMEN DESCRIPTION: ABNORMAL

## 2020-10-15 RX ORDER — METRONIDAZOLE 500 MG/1
500 TABLET ORAL 2 TIMES DAILY
Qty: 14 TABLET | Refills: 0 | Status: SHIPPED | OUTPATIENT
Start: 2020-10-15 | End: 2020-10-22

## 2020-10-15 NOTE — RESULT ENCOUNTER NOTE
Vaginits DNA probe positive for BV. Rx for Flagyl  transmitted to pt pharmacy. Please notify pt to fill Rx only if she is experiencing symptoms of bacterial vaginosis.

## 2020-10-16 LAB
C TRACH DNA GENITAL QL NAA+PROBE: NEGATIVE
N. GONORRHOEAE DNA: NEGATIVE
SPECIMEN DESCRIPTION: NORMAL

## 2020-10-30 RX ORDER — ESTRADIOL 2 MG/1
TABLET ORAL
Qty: 21 TABLET | Refills: 0 | OUTPATIENT
Start: 2020-10-30

## 2020-11-24 RX ORDER — TIZANIDINE 4 MG/1
TABLET ORAL
Qty: 90 TABLET | Refills: 0 | Status: SHIPPED | OUTPATIENT
Start: 2020-11-24 | End: 2021-01-19

## 2020-12-08 RX ORDER — ACETAMINOPHEN 500 MG
TABLET ORAL
Qty: 30 TABLET | Refills: 0 | Status: SHIPPED | OUTPATIENT
Start: 2020-12-08 | End: 2021-05-13 | Stop reason: SDUPTHER

## 2020-12-15 RX ORDER — GLIPIZIDE 10 MG/1
TABLET ORAL
Qty: 60 TABLET | Refills: 3 | OUTPATIENT
Start: 2020-12-15

## 2021-01-15 DIAGNOSIS — E11.9 TYPE 2 DIABETES MELLITUS WITHOUT COMPLICATION, WITHOUT LONG-TERM CURRENT USE OF INSULIN (HCC): ICD-10-CM

## 2021-01-15 DIAGNOSIS — G89.29 CHRONIC BILATERAL LOW BACK PAIN WITHOUT SCIATICA: ICD-10-CM

## 2021-01-15 DIAGNOSIS — M54.50 CHRONIC BILATERAL LOW BACK PAIN WITHOUT SCIATICA: ICD-10-CM

## 2021-01-15 DIAGNOSIS — L73.2 HIDRADENITIS SUPPURATIVA: ICD-10-CM

## 2021-01-19 RX ORDER — ISOPROPYL ALCOHOL 0.75 G/1
SWAB TOPICAL
Qty: 100 EACH | Refills: 0 | Status: SHIPPED | OUTPATIENT
Start: 2021-01-19 | End: 2021-05-13 | Stop reason: SDUPTHER

## 2021-01-19 RX ORDER — DOXYCYCLINE HYCLATE 100 MG/1
CAPSULE ORAL
Qty: 60 CAPSULE | Refills: 0 | Status: SHIPPED | OUTPATIENT
Start: 2021-01-19 | End: 2021-03-08

## 2021-01-19 RX ORDER — GLIPIZIDE 10 MG/1
TABLET ORAL
Qty: 60 TABLET | Refills: 0 | Status: SHIPPED | OUTPATIENT
Start: 2021-01-19 | End: 2021-02-15

## 2021-01-19 RX ORDER — TIZANIDINE 4 MG/1
TABLET ORAL
Qty: 90 TABLET | Refills: 0 | Status: SHIPPED | OUTPATIENT
Start: 2021-01-19 | End: 2021-03-08

## 2021-01-19 NOTE — TELEPHONE ENCOUNTER
PC to patient - Number busy. Unable to contact patient. Refill request for pended medications. If appropriate please send medication(s) to patients pharmacy. Next appt: None currently. Note to pharmacy to have patient contact the office to schedule appointment. Multiple attempts have been made to contact patient. Last appt: 7/31/2020    Health Maintenance   Topic Date Due    Pneumococcal 0-64 years Vaccine (1 of 1 - PPSV23) 11/17/1983    Diabetic foot exam  11/17/1987    Diabetic retinal exam  11/17/1987    Hepatitis B vaccine (1 of 3 - Risk 3-dose series) 11/17/1996    DTaP/Tdap/Td vaccine (1 - Tdap) 11/17/1996    Flu vaccine (1) 09/01/2020    Lipid screen  09/11/2020    A1C test (Diabetic or Prediabetic)  07/24/2021    Diabetic microalbuminuria test  07/24/2021    Cervical cancer screen  04/07/2022    Hepatitis C screen  Completed    HIV screen  Completed    Hepatitis A vaccine  Aged Out    Hib vaccine  Aged Out    Meningococcal (ACWY) vaccine  Aged Out       Hemoglobin A1C (%)   Date Value   07/24/2020 6.6 (H)   02/18/2020 6.6 (H)   09/11/2019 6.2 (H)             ( goal A1C is < 7)   Microalb/Crt.  Ratio (mcg/mg creat)   Date Value   07/24/2020 48 (H)     LDL Cholesterol (mg/dL)   Date Value   09/11/2019 81       (goal LDL is <100)   AST (U/L)   Date Value   05/11/2018 11     ALT (U/L)   Date Value   11/14/2019 15     BUN (mg/dL)   Date Value   11/14/2019 7     BP Readings from Last 3 Encounters:   10/13/20 123/75   07/31/20 118/75   04/26/20 (!) 165/82          (goal 120/80)          Patient Active Problem List:     Hyperlipidemia     Hypertension     Diabetes mellitus ([address]     GERD (gastroesophageal reflux disease)     Obesity, morbid, BMI 50 or higher (HCC)     Hidradenitis suppurativa     Sleep apnea     Perirectal abscess     Iron deficiency anemia     Dysmenorrhea     Menorrhagia     Acute cystitis without hematuria     Abnormal uterine bleeding (AUB)     Hysteroscopy, D&C, Myosure, Mirena IUD Insertion 11/18/19

## 2021-02-15 DIAGNOSIS — E11.9 TYPE 2 DIABETES MELLITUS WITHOUT COMPLICATION, WITHOUT LONG-TERM CURRENT USE OF INSULIN (HCC): ICD-10-CM

## 2021-02-15 RX ORDER — GLIPIZIDE 10 MG/1
TABLET ORAL
Qty: 60 TABLET | Refills: 0 | Status: SHIPPED | OUTPATIENT
Start: 2021-02-15 | End: 2021-05-24

## 2021-02-15 NOTE — TELEPHONE ENCOUNTER
Refill request for Glipizide. If appropriate please send medication(s) to patients pharmacy. Next appt: 3/1/2021      Health Maintenance   Topic Date Due    Pneumococcal 0-64 years Vaccine (1 of 1 - PPSV23) 11/17/1983    Diabetic foot exam  11/17/1987    Diabetic retinal exam  11/17/1987    COVID-19 Vaccine (1 of 2) 11/17/1993    Hepatitis B vaccine (1 of 3 - Risk 3-dose series) 11/17/1996    DTaP/Tdap/Td vaccine (1 - Tdap) 11/17/1996    Flu vaccine (1) 09/01/2020    Lipid screen  09/11/2020    A1C test (Diabetic or Prediabetic)  07/24/2021    Diabetic microalbuminuria test  07/24/2021    Cervical cancer screen  04/07/2022    Hepatitis C screen  Completed    HIV screen  Completed    Hepatitis A vaccine  Aged Out    Hib vaccine  Aged Out    Meningococcal (ACWY) vaccine  Aged Out       Hemoglobin A1C (%)   Date Value   07/24/2020 6.6 (H)   02/18/2020 6.6 (H)   09/11/2019 6.2 (H)             ( goal A1C is < 7)   Microalb/Crt.  Ratio (mcg/mg creat)   Date Value   07/24/2020 48 (H)     LDL Cholesterol (mg/dL)   Date Value   09/11/2019 81       (goal LDL is <100)   AST (U/L)   Date Value   05/11/2018 11     ALT (U/L)   Date Value   11/14/2019 15     BUN (mg/dL)   Date Value   11/14/2019 7     BP Readings from Last 3 Encounters:   10/13/20 123/75   07/31/20 118/75   04/26/20 (!) 165/82          (goal 120/80)          Patient Active Problem List:     Hyperlipidemia     Hypertension     Diabetes mellitus ([address]     GERD (gastroesophageal reflux disease)     Obesity, morbid, BMI 50 or higher (HCC)     Hidradenitis suppurativa     Sleep apnea     Perirectal abscess     Iron deficiency anemia     Dysmenorrhea     Menorrhagia     Acute cystitis without hematuria     Abnormal uterine bleeding (AUB)     Hysteroscopy, D&C, Myosure, Mirena IUD Insertion 11/18/19

## 2021-03-01 ENCOUNTER — OFFICE VISIT (OUTPATIENT)
Dept: INTERNAL MEDICINE | Age: 46
End: 2021-03-01
Payer: COMMERCIAL

## 2021-03-01 ENCOUNTER — HOSPITAL ENCOUNTER (OUTPATIENT)
Age: 46
Setting detail: SPECIMEN
Discharge: HOME | End: 2021-03-01
Payer: COMMERCIAL

## 2021-03-01 VITALS
DIASTOLIC BLOOD PRESSURE: 86 MMHG | TEMPERATURE: 97.1 F | HEIGHT: 60 IN | BODY MASS INDEX: 57.52 KG/M2 | HEART RATE: 80 BPM | SYSTOLIC BLOOD PRESSURE: 139 MMHG | WEIGHT: 293 LBS

## 2021-03-01 DIAGNOSIS — Z13.220 SCREENING FOR HYPERLIPIDEMIA: ICD-10-CM

## 2021-03-01 DIAGNOSIS — M25.562 CHRONIC PAIN OF LEFT KNEE: ICD-10-CM

## 2021-03-01 DIAGNOSIS — D64.9 ANEMIA, UNSPECIFIED TYPE: ICD-10-CM

## 2021-03-01 DIAGNOSIS — E11.9 TYPE 2 DIABETES MELLITUS WITHOUT COMPLICATION, WITHOUT LONG-TERM CURRENT USE OF INSULIN (HCC): Primary | ICD-10-CM

## 2021-03-01 DIAGNOSIS — G89.29 CHRONIC PAIN OF LEFT KNEE: ICD-10-CM

## 2021-03-01 DIAGNOSIS — I10 HYPERTENSION, UNSPECIFIED TYPE: Chronic | ICD-10-CM

## 2021-03-01 LAB
CHOLESTEROL/HDL RATIO: 3.4
CHOLESTEROL: 149 MG/DL
HBA1C MFR BLD: 6.4 %
HCT VFR BLD CALC: 37.9 % (ref 36.3–47.1)
HDLC SERPL-MCNC: 44 MG/DL
HEMOGLOBIN: 11.7 G/DL (ref 11.9–15.1)
LDL CHOLESTEROL: 81 MG/DL (ref 0–130)
MCH RBC QN AUTO: 28.5 PG (ref 25.2–33.5)
MCHC RBC AUTO-ENTMCNC: 30.9 G/DL (ref 28.4–34.8)
MCV RBC AUTO: 92.4 FL (ref 82.6–102.9)
NRBC AUTOMATED: 0 PER 100 WBC
PDW BLD-RTO: 15.9 % (ref 11.8–14.4)
PLATELET # BLD: 243 K/UL (ref 138–453)
PMV BLD AUTO: 12.9 FL (ref 8.1–13.5)
RBC # BLD: 4.1 M/UL (ref 3.95–5.11)
TRIGL SERPL-MCNC: 118 MG/DL
VLDLC SERPL CALC-MCNC: NORMAL MG/DL (ref 1–30)
WBC # BLD: 7.6 K/UL (ref 3.5–11.3)

## 2021-03-01 PROCEDURE — G8417 CALC BMI ABV UP PARAM F/U: HCPCS | Performed by: NURSE PRACTITIONER

## 2021-03-01 PROCEDURE — 4004F PT TOBACCO SCREEN RCVD TLK: CPT | Performed by: NURSE PRACTITIONER

## 2021-03-01 PROCEDURE — 83036 HEMOGLOBIN GLYCOSYLATED A1C: CPT | Performed by: NURSE PRACTITIONER

## 2021-03-01 PROCEDURE — 99211 OFF/OP EST MAY X REQ PHY/QHP: CPT | Performed by: NURSE PRACTITIONER

## 2021-03-01 PROCEDURE — G8484 FLU IMMUNIZE NO ADMIN: HCPCS | Performed by: NURSE PRACTITIONER

## 2021-03-01 PROCEDURE — G8427 DOCREV CUR MEDS BY ELIG CLIN: HCPCS | Performed by: NURSE PRACTITIONER

## 2021-03-01 PROCEDURE — 2022F DILAT RTA XM EVC RTNOPTHY: CPT | Performed by: NURSE PRACTITIONER

## 2021-03-01 PROCEDURE — 3044F HG A1C LEVEL LT 7.0%: CPT | Performed by: NURSE PRACTITIONER

## 2021-03-01 PROCEDURE — 99214 OFFICE O/P EST MOD 30 MIN: CPT | Performed by: NURSE PRACTITIONER

## 2021-03-01 RX ORDER — BLOOD PRESSURE TEST KIT
KIT MISCELLANEOUS
Qty: 1 KIT | Refills: 0 | Status: SHIPPED | OUTPATIENT
Start: 2021-03-01 | End: 2021-09-24

## 2021-03-01 ASSESSMENT — PATIENT HEALTH QUESTIONNAIRE - PHQ9
SUM OF ALL RESPONSES TO PHQ QUESTIONS 1-9: 0
SUM OF ALL RESPONSES TO PHQ QUESTIONS 1-9: 0

## 2021-03-01 NOTE — PATIENT INSTRUCTIONS
Return To Clinic 4/12/2021 for 6 week follow up via video. After Visit Summary given and reviewed. The medication list included in this document is our record of what you are currently taking, including any changes that were made at today's visit. If you find any differences when compared to your medications at home, or have any questions that were not answered at your visit, please contact the office. It is very important for your care that you keep your appointment. If for some reason you are unable to keep your appointment, it is equally important that you call our office at [phone] to cancel your appointment and reschedule. Failure to do so may result in your termination from our practice. Medications have been e-scribed to pharmacy of patients choice. LABORATORY INSTRUCTIONS    Your doctor has ordered blood or urine testing. You can get this testing done at the Lab located on the first floor of the [facility], or at any other [facility]. Please stop at Main Registration, before going to the lab, as you must be registered first.     Please get this lab done before your next visit. You may eat or drink before this test.    Referral for Orthopedics sent to [address] [place] [address] and Sports Medicine. Specialty office will contact patient for appointment. A copy of referral with contact information and address given to patient. Patient should contact specialist office if no contact has been made to patient within 1 week. An order for Blood Pressure Kit was given to patient. Patient was advised that this order must be taken to a [address] [place] [address] in order to receive product. List of DME Suppliers was given to patient along with order. Call to schedule appointment with Dr. Avery Mendes - [phone]    SHERRY Crowell

## 2021-03-01 NOTE — PROGRESS NOTES
3/1/2021     Perez Knox (:  1975) is a 39 y.o. female, here for evaluation of the following medical concerns:    HPI  Diabetes Mellitus Type 2: Current symptoms/problems include none. Medication compliance:  compliant most of the time  Diabetic diet compliance:  compliant most of the time,  Weight trend: increasing  Current exercise: no regular exercise  Barriers: lack of motivation    Home blood sugar records: patient does not test  Any episodes of hypoglycemia? no  Eye exam current (within one year): no   reports that she has been smoking cigarettes. She has a 15.00 pack-year smoking history. She has never used smokeless tobacco.   Daily Aspirin? No    Lab Results   Component Value Date    LABA1C 6.4 2021    LABA1C 6.6 (H) 2020    LABA1C 6.6 (H) 2020     Lab Results   Component Value Date    LABMICR 48 (H) 2020    CREATININE 0.47 (L) 2019     Lab Results   Component Value Date    ALT 15 2019    AST 11 2018     Lab Results   Component Value Date    CHOL 141 2017    TRIG 71 2017    HDL 51 2019        Chronic Joint Pain:  Patient presents for follow-up of pain in the left knee(s). Pain is unchanged. On average, pain is perceived as moderate (4-6 pain scale). Change in quality of symptoms: no.  Current treatment: ice, NSAIDs- naproxen, which has been not very effective. Medication side effects: none. Recent diagnostic testing: none. Sharyn Freiberg and twisted her knee while trying to dance in mid December. She heard something pop when she fell. It was swollen and painful for several weeks. She tired ice and rest which helped the swelling. It is still painful. She admits to it feeling unstable at times. Review of Systems   Musculoskeletal: Positive for gait problem and joint swelling. All other systems reviewed and are negative. Prior to Visit Medications    Medication Sig Taking?  Authorizing Provider metFORMIN (GLUCOPHAGE) 500 MG tablet Take 1 tablet by mouth daily (with breakfast) Yes Dary Zapata APRN - CNP   Blood Pressure KIT Use to monitor blood pressure daily and as needed Yes Dary Ache, APRN - CNP   glipiZIDE (GLUCOTROL) 10 MG tablet take 1 tablet by mouth twice a day before meals Yes Dary Ache, APRN - CNP   tiZANidine (ZANAFLEX) 4 MG tablet take 1 tablet by mouth three times a day Yes Dary Ache, APRN - CNP   doxycycline hyclate (VIBRAMYCIN) 100 MG capsule take 1 capsule by mouth twice a day with food Yes Dary Ache, APRN - CNP   Alcohol Swabs (B-D SINGLE USE SWABS REGULAR) PADS USE TO CLEANSE AREA AS NEEDED Yes Dary Ache, APRN - CNP   acetaminophen (RA ACETAMINOPHEN EX ST) 500 MG tablet take 2 tablets by mouth every 8 hours if needed Yes Dary Ache, APRN - CNP   benzoyl peroxide 5 % external liquid Wash face, chest and back 1-2 times daily Yes Dary Ache, APRN - CNP   clindamycin (CLEOCIN T) 1 % lotion Apply to affected areas daily Yes Dary Ache, APRN - CNP   ibuprofen (IBU) 600 MG tablet Take 1 tablet by mouth every 6 hours as needed for Pain Yes Dary Ache APRN - CNP        Social History     Tobacco Use    Smoking status: Current Every Day Smoker     Packs/day: 0.50     Years: 30.00     Pack years: 15.00     Types: Cigarettes    Smokeless tobacco: Never Used   Substance Use Topics    Alcohol use: Not Currently     Comment: Occasionally        Vitals:    03/01/21 1048 03/01/21 1120   BP: (!) 135/93 139/86   Site: Left Upper Arm Left Upper Arm   Position: Sitting Sitting   Cuff Size: Large Adult Large Adult   Pulse: 86 80   Temp: 97.1 °F (36.2 °C)    TempSrc: Temporal    Weight: (!) 323 lb 9.6 oz (146.8 kg)    Height: 5' (1.524 m)      Estimated body mass index is 63.2 kg/m² as calculated from the following:    Height as of this encounter: 5' (1.524 m). Weight as of this encounter: 323 lb 9.6 oz (146.8 kg).     Physical Exam Vitals signs and nursing note reviewed. Constitutional:       General: She is not in acute distress. HENT:      Head: Normocephalic and atraumatic. Right Ear: External ear normal.      Left Ear: External ear normal.      Nose: Nose normal.      Mouth/Throat:      Pharynx: Oropharynx is clear. Eyes:      Conjunctiva/sclera: Conjunctivae normal.   Neck:      Musculoskeletal: Normal range of motion and neck supple. Cardiovascular:      Rate and Rhythm: Normal rate and regular rhythm. Pulses:           Dorsalis pedis pulses are 3+ on the right side and 3+ on the left side. Posterior tibial pulses are 3+ on the right side and 3+ on the left side. Pulmonary:      Effort: Pulmonary effort is normal.      Breath sounds: Normal breath sounds. Abdominal:      General: Bowel sounds are normal.   Musculoskeletal:      Left knee: Tenderness found. Medial joint line tenderness noted. Feet:      Right foot:      Protective Sensation: 10 sites tested. 10 sites sensed. Skin integrity: Callus present. Toenail Condition: Right toenails are normal.      Left foot:      Protective Sensation: 10 sites tested. 10 sites sensed. Skin integrity: Callus present. Toenail Condition: Left toenails are normal.   Skin:     General: Skin is warm and dry. Neurological:      General: No focal deficit present. Mental Status: She is alert and oriented to person, place, and time. Psychiatric:         Mood and Affect: Mood normal.         Behavior: Behavior normal.         Thought Content: Thought content normal.         Judgment: Judgment normal.         ASSESSMENT/PLAN:  1. Type 2 diabetes mellitus without complication, without long-term current use of insulin ([facility])  - continue glipizide  -  DIABETES FOOT EXAM  - POCT glycosylated hemoglobin (Hb A1C)  - metFORMIN (GLUCOPHAGE) 500 MG tablet; Take 1 tablet by mouth daily (with breakfast)  Dispense: 30 tablet;  Refill: 3 2. Screening for hyperlipidemia  - Lipid Panel; Future    3. Chronic pain of left knee  - [facility] - Adina Nicole DO, Orthopedic Surgery, [facility]    4. Hypertension, unspecified type  - Blood Pressure KIT; Use to monitor blood pressure daily and as needed  Dispense: 1 kit; Refill: 0    5. Anemia, unspecified type  - CBC; Future      Return in about 6 weeks (around 4/12/2021) for review of BP logs. An electronic signature was used to authenticate this note.     --JAQUELIN Vasquez - CNP on 3/1/2021 at 11:59 AM

## 2021-03-05 DIAGNOSIS — G89.29 CHRONIC BILATERAL LOW BACK PAIN WITHOUT SCIATICA: ICD-10-CM

## 2021-03-05 DIAGNOSIS — L73.2 HIDRADENITIS SUPPURATIVA: ICD-10-CM

## 2021-03-05 DIAGNOSIS — M54.50 CHRONIC BILATERAL LOW BACK PAIN WITHOUT SCIATICA: ICD-10-CM

## 2021-03-05 NOTE — TELEPHONE ENCOUNTER
Refill request for pended medicatons. If appropriate please send medication(s) to patients pharmacy. Next appt: 4/12/2021      Health Maintenance   Topic Date Due    Diabetic retinal exam  Never done    Flu vaccine (1) 06/30/2021 (Originally 9/1/2020)    Hepatitis B vaccine (1 of 3 - Risk 3-dose series) 09/01/2021 (Originally 11/17/1996)    DTaP/Tdap/Td vaccine (1 - Tdap) 09/01/2021 (Originally 11/17/1996)    Pneumococcal 0-64 years Vaccine (1 of 1 - PPSV23) 09/01/2021 (Originally 11/17/1983)    Diabetic microalbuminuria test  07/24/2021    Diabetic foot exam  03/01/2022    A1C test (Diabetic or Prediabetic)  03/01/2022    Lipid screen  03/01/2022    Cervical cancer screen  04/07/2022    Hepatitis C screen  Completed    HIV screen  Completed    Hepatitis A vaccine  Aged Out    Hib vaccine  Aged Out    Meningococcal (ACWY) vaccine  Aged Out       Hemoglobin A1C (%)   Date Value   03/01/2021 6.4   07/24/2020 6.6 (H)   02/18/2020 6.6 (H)             ( goal A1C is < 7)   Microalb/Crt.  Ratio (mcg/mg creat)   Date Value   07/24/2020 48 (H)     LDL Cholesterol (mg/dL)   Date Value   03/01/2021 81       (goal LDL is <100)   AST (U/L)   Date Value   05/11/2018 11     ALT (U/L)   Date Value   11/14/2019 15     BUN (mg/dL)   Date Value   11/14/2019 7     BP Readings from Last 3 Encounters:   03/01/21 139/86   10/13/20 123/75   07/31/20 118/75          (goal 120/80)          Patient Active Problem List:     Hyperlipidemia     Hypertension     Diabetes mellitus ([address]     GERD (gastroesophageal reflux disease)     Obesity, morbid, BMI 50 or higher (HCC)     Hidradenitis suppurativa     Sleep apnea     Perirectal abscess     Iron deficiency anemia     Dysmenorrhea     Menorrhagia     Acute cystitis without hematuria     Abnormal uterine bleeding (AUB)     Hysteroscopy, D&C, Myosure, Mirena IUD Insertion 11/18/19

## 2021-03-08 RX ORDER — IBUPROFEN 800 MG/1
TABLET ORAL
Qty: 30 TABLET | Refills: 1 | Status: SHIPPED | OUTPATIENT
Start: 2021-03-08 | End: 2021-04-13 | Stop reason: SDUPTHER

## 2021-03-08 RX ORDER — TIZANIDINE 4 MG/1
TABLET ORAL
Qty: 90 TABLET | Refills: 0 | Status: SHIPPED | OUTPATIENT
Start: 2021-03-08 | End: 2021-05-13 | Stop reason: SDUPTHER

## 2021-03-08 RX ORDER — DOXYCYCLINE HYCLATE 100 MG/1
CAPSULE ORAL
Qty: 60 CAPSULE | Refills: 0 | Status: SHIPPED | OUTPATIENT
Start: 2021-03-08 | End: 2021-04-13

## 2021-03-12 DIAGNOSIS — M25.562 LEFT KNEE PAIN, UNSPECIFIED CHRONICITY: Primary | ICD-10-CM

## 2021-03-15 ENCOUNTER — OFFICE VISIT (OUTPATIENT)
Dept: ORTHOPEDIC SURGERY | Age: 46
End: 2021-03-15
Payer: COMMERCIAL

## 2021-03-15 VITALS — HEIGHT: 60 IN | BODY MASS INDEX: 57.52 KG/M2 | WEIGHT: 293 LBS

## 2021-03-15 DIAGNOSIS — M17.12 ARTHRITIS OF LEFT KNEE: Primary | ICD-10-CM

## 2021-03-15 PROCEDURE — 99244 OFF/OP CNSLTJ NEW/EST MOD 40: CPT | Performed by: ORTHOPAEDIC SURGERY

## 2021-03-15 PROCEDURE — G8427 DOCREV CUR MEDS BY ELIG CLIN: HCPCS | Performed by: ORTHOPAEDIC SURGERY

## 2021-03-15 PROCEDURE — G8417 CALC BMI ABV UP PARAM F/U: HCPCS | Performed by: ORTHOPAEDIC SURGERY

## 2021-03-15 PROCEDURE — G8484 FLU IMMUNIZE NO ADMIN: HCPCS | Performed by: ORTHOPAEDIC SURGERY

## 2021-03-15 ASSESSMENT — ENCOUNTER SYMPTOMS
CONSTIPATION: 0
DIARRHEA: 0
NAUSEA: 0
COUGH: 0

## 2021-03-15 NOTE — LETTER
Dr. Caitlyn Taylor  [address] [address]  Dept: [phone]  Dept Fax: [phone]        3/17/21    Patient: Cortes Taylor  YOB: 1975    Dear JAQUELIN Montaño CNP,    I had the pleasure of seeing one of your patients, Elan Johnson today in the office. Below are the relevant portions of my assessment and plan of care. IMPRESSION:  1. Arthritis of left knee      PLAN:  Discussed etiology and natural history of left knee arthritis. The treatment options may include oral anti-inflammatories, bracing, injections, advanced imaging, activity modification, physical therapy and/or surgical intervention. The patient would like to proceed with physical therapy for 6 weeks. The patient will follow up in 7 weeks. We discussed that the patient should call us with any concerns or questions. Thank you for allowing me to participate in the care of this patient. I will keep you updated on this patient's follow up and I look forward to serving you and your patients again in the future. Please don't hesitate to contact me at my mobile number [phone].         Monster Huggins

## 2021-03-15 NOTE — PROGRESS NOTES
[facility]  [facility]  [address] [facility] [address]  Dept: [phone]    Ambulatory Orthopedic Consult      CHIEF COMPLAINT:    Chief Complaint   Patient presents with    New Patient     left knee pain  fall in December. popping and radiates to ankle. HISTORY OF PRESENT ILLNESS:      The patient is a 39 y.o. female who is being seen at the request of  JAQUELIN Moser CNP for consultation and evaluation of  Left knee pain. Patient says she fell back in 12/2020. She says since then she has had a lot of pain. Most pain is with walking. She also experiencing vasquez-horse like symptoms to her posterior thigh. Patient says her mobility and activities of daily living are really affected. She said usually it takes one day to clean her house and now it takes one week. She notices that she is sitting down a lot more when she is doing activities such as cleaning dishes and making dinner. Patient says prior to her fall she had a normal left knee and could dance, etc. Patient has been taking ibuprofen and tylenol.      Past Medical History:    Past Medical History:   Diagnosis Date    Diabetes mellitus ([address]     I do not know    GERD (gastroesophageal reflux disease)     Hidradenitis suppurativa     Hyperlipidemia     Hypertension     PCP Judi Hernandez NP, seen 10/2019    Obesity, morbid, BMI 50 or higher ([address]     Sleep apnea     CPAP use at 16       Past Surgical History:    Past Surgical History:   Procedure Laterality Date    ABSCESS DRAINAGE  12/23/2016    BUTTOCK    DILATION AND CURETTAGE OF UTERUS N/A 11/18/2019    DILATATION AND CURETTAGE HYSTEROSCOPY, MYOSURE, MIRENA IUD INSERTION performed by Pelon Pelaez MD at [address] [place] [address] N/A 07/02/2018    RECTAL PERIRECTAL INCISION AND DRAINAGE performed by Anna Armenta DO at [address] [place] [address]       Current Medications:   Current Outpatient Medications Medication Sig Dispense Refill    doxycycline hyclate (VIBRAMYCIN) 100 MG capsule take 1 capsule by mouth twice a day with food 60 capsule 0    ibuprofen (ADVIL;MOTRIN) 800 MG tablet take 1 tablet by mouth every 8 hours if needed for pain 30 tablet 1    tiZANidine (ZANAFLEX) 4 MG tablet take 1 tablet by mouth three times a day 90 tablet 0    metFORMIN (GLUCOPHAGE) 500 MG tablet Take 1 tablet by mouth daily (with breakfast) 30 tablet 3    acetaminophen (RA ACETAMINOPHEN EX ST) 500 MG tablet take 2 tablets by mouth every 8 hours if needed 30 tablet 0    benzoyl peroxide 5 % external liquid Wash face, chest and back 1-2 times daily 227 g 3    clindamycin (CLEOCIN T) 1 % lotion Apply to affected areas daily 60 mL 3    Blood Pressure KIT Use to monitor blood pressure daily and as needed 1 kit 0    glipiZIDE (GLUCOTROL) 10 MG tablet take 1 tablet by mouth twice a day before meals (Patient not taking: Reported on 3/15/2021) 60 tablet 0    Alcohol Swabs (B-D SINGLE USE SWABS REGULAR) PADS USE TO CLEANSE AREA AS NEEDED 100 each 0     No current facility-administered medications for this visit. Allergies:    Patient has no known allergies. Social History:   Social History     Socioeconomic History    Marital status: Single     Spouse name: Not on file    Number of children: 3    Years of education: 8th grade    Highest education level: Not on file   Occupational History    Occupation: unemployed     Comment: working on [address] [facility] [employer] [facility] [employer] resource strain: Not on file    Food insecurity     Worry: Not on file     Inability: Not on file   [employer] needs     Medical: Not on file     Non-medical: Not on file   Tobacco Use    Smoking status: Current Every Day Smoker     Packs/day: 0.50     Years: 30.00     Pack years: 15.00     Types: Cigarettes    Smokeless tobacco: Never Used   Substance and Sexual Activity    Alcohol use: Not Currently     Comment: Occasionally    Drug use:  Yes Frequency: 1.5 times per week     Types: Marijuana     Comment: 4 x a month    Sexual activity: Yes     Partners: Male     Comment: 1 partner boyfriend   Lifestyle    Physical activity     Days per week: Not on file     Minutes per session: Not on file    Stress: Not on file   Relationships    Social connections     Talks on phone: Not on file     Gets together: Not on file     Attends Hindu service: Not on file     Active member of club or organization: Not on file     Attends meetings of clubs or organizations: Not on file     Relationship status: Not on file    Intimate partner violence     Fear of current or ex partner: Not on file     Emotionally abused: Not on file     Physically abused: Not on file     Forced sexual activity: Not on file   Other Topics Concern    Not on file   Social History Narrative    Lives with 4 kids age 24yo to 32year old       Family History:  Family History   Problem Relation Age of Onset    Vision Loss Mother     Glaucoma Mother     Diabetes Father     High Blood Pressure Father     Glaucoma Father     Kidney Cancer Maternal Grandmother     Kidney stones Brother     No Known Problems Maternal Grandfather     Diabetes Paternal Grandmother     Other Brother         stomach problem         REVIEW OF SYSTEMS:  Review of Systems   Constitutional: Negative for chills and fever. Respiratory: Negative for cough. Gastrointestinal: Negative for constipation, diarrhea and nausea. Musculoskeletal: Positive for arthralgias (left knee). Negative for gait problem, joint swelling and myalgias. Neurological: Negative for dizziness, weakness and numbness. I have reviewed the CC, HPI, ROS, PMH, FHX, Social History, and if not present in this note, I have reviewed in the patient's chart. I agree with the documentation provided by other staff and have reviewed their documentation prior to providing my signature indicating agreement.     PHYSICAL EXAM:  Ht 5' therapy and/or surgical intervention. The patient would like to proceed with physical therapy for 6 weeks. The patient will follow up in 7 weeks. We discussed that the patient should call us with any concerns or questions. Return in about 7 weeks (around 5/3/2021) for re- evaluation. No orders of the defined types were placed in this encounter. Orders Placed This Encounter   Procedures    Ambulatory referral to Physical Therapy     Referral Priority:   Routine     Referral Type:   Eval and Treat     Referral Reason:   Specialty Services Required     Number of Visits Requested:   1     I, Danny Holm RN am scribing for and in the presence of Dr. Tere Mcgill  3/17/2021 7:21 AM      I have reviewed and made changes accordingly to the work scribed by Danny Holm RN. The documentation accurately reflects work and decisions made by me. I have also reviewed documentation completed by clinical staff.     Tere Mcgill DO, [address] [facility]  3/17/2021 7:21 AM    This note is created with the assistance of a speech recognition program.  While intending to generate a document that actually reflects the content of the visit, the document can still have some errors including those of syntax and sound a like substitutions which may escape proof reading.  In such instances, actual meaning can be extrapolated by contextual diversion      Electronically signed by Ana Maria Ortega on 3/17/2021 at 7:21 AM

## 2021-03-22 ENCOUNTER — HOSPITAL ENCOUNTER (OUTPATIENT)
Dept: PHYSICAL THERAPY | Age: 46
Setting detail: THERAPIES SERIES
Discharge: HOME | End: 2021-03-22
Payer: COMMERCIAL

## 2021-03-22 PROCEDURE — 97140 MANUAL THERAPY 1/> REGIONS: CPT

## 2021-03-22 PROCEDURE — 97110 THERAPEUTIC EXERCISES: CPT

## 2021-03-22 PROCEDURE — 97161 PT EVAL LOW COMPLEX 20 MIN: CPT

## 2021-03-22 NOTE — CONSULTS
changes   [] MRI:  [] Other:    Medications: [x] Refer to full medical record [] None [] Other:  Allergies:      [x] Refer to full medical record [] None [] Other:    Function:  Hand Dominance  [] Right  [] Left  Employer    Job Status []  Normal duty   [] Light duty   [] Off due to condition    []  Retired   [x] Not employed   [] Disability  [] Other:  []  Return to work: Work activities/duties      Pain:  [x] Yes  [] No Location: Left knee and calf Pain Rating: (0-10 scale) 10/10  Pain altered Tx:  [] Yes  [x] No  Action:    Symptoms:  [] Improving [] Worsening [x] Same  Better:  [] AM    [] PM    [] Sit    [] Rise/Sit    []Stand    [] Walk    [] Lying    [x] Other: Ice pack  Worse: [] AM    [] PM    [] Sit    [] Rise/Sit    []Stand    [x] Walk    [] Lying    [x] Bend                      [] Valsalva    [] Other:  Sleep: [] OK    [x] Disturbed    Objective:    ROM  ° A/P STRENGTH TESTS (+/-) Left Right Not Tested    Left Right Left Right Ant.  Drawer   []   Hip Flex   4- 4+ Post. Drawer   []   Ext     Lachmans -  []   ER     Valgus Stress pain  []   IR     Varus Stress -  []   ABD     Melvins   []   ADD     Apleys Comp.   []   Knee Flex 105 105 4 4+ Apleys Dist.   []   Ext -5  4 4+ Hip Scouring   []   Ankle DF 5 10   PARASs   []   PF 40 60   Piriformis   []   INV     Lucys   []   EVER     Talor Tilt   []        Pat-Fem Grind   []       OBSERVATION No Deficit Deficit Not Tested Comments   Posture       Forward Head [] [] []    Rounded Shoulders [] [] []    Kyphosis [] [] []    Lordosis [] [] []    Lateral Shift [] [] []    Scoliosis [] [] []    Iliac Crest [] [] []    PSIS [] [] []    ASIS [] [] []    Genu Valgus [] [x] []    Genu Varus [x] [] []    Genu Recurvatum [x] [] []    Pronation [] [x] [] Left more pronated than the right   Supination [x] [] []    Leg Length Discrp [] [] []    Slumped Sitting [] [] []    Palpation [] [x] [] Tenderness to the medial joint line and medial knee; multiple tender spots of the left calf   Sensation [x] [] []    Edema [] [x] []    Neurological [x] [] []    Patellar Mobility [] [x] []    Patellar Orientation [] [x] []    Gait [] [x] [] Analysis: Antalgic gait         FUNCTION Normal Difficult Unable   Sitting [] [x] []   Standing [] [x] []   Ambulation [] [x] []   Groom/Dress [] [x] []   Lift/Carry [] [x] []   Stairs [x] [] []   Bending [] [x] []   Squat [x] [] []   Kneel [x] [] []     BALANCE/PROPRIOCEPTION              [] Not tested   Single leg stance       R                     L                                PAIN   Eyes open                             Sec. Sec                  . []    Eyes closed                          Sec. Sec                  . []        FUNCTIONAL TESTS PAIN NO PAIN COMMENTS   Step Test 4 [] []    6 [] []    8 [] []    Squat [] []      Functional Test: LEFS  Score: 79% functionally impaired     Comments:    Assessment:  Patient would benefit from skilled physical therapy services in order to: address limited left knee ROM, tight left gastroc and soleous, left knee weakness, left hip weakness, and functional impairments including difficulty walking even short distances. Problems:    [x] ? Pain:  [x] ? ROM:  [x] ? Strength:  [x] ? Function:  [] Other:       STG: (to be met in 8 treatments)  1. ? Pain: Decrease left knee and calf pain to 8/10  2. ? ROM: Increase left knee to full, pain-free flexion and extension; Increase left ankle DF to 10°, PF to 50°  3. ? Strength: Increase left quads and hamstring strength to 4+/5, hip flex to 4/5   4. ? Function: Improve LEFS to 65% impairment  5. Patient to be independent with home exercise program as demonstrated by performance with correct form without cues. 6. Demonstrate Knowledge of fall prevention  LTG: (to be met in 12 treatments)  1. Pt will be able to walk a short distance without difficulty  2.  Pt will be able to go up and down stairs with no more than moderate difficulty  3. Improve LEFS to 50% impairment                   Patient goals: \"To be able to be myself again. \"    Rehab Potential:  [x] Good  [] Fair  [] Poor   Suggested Professional Referral:  [x] No  [] Yes:  Barriers to Goal Achievement:  [x] No  [] Yes:  Domestic Concerns:  [x] No  [] Yes:    Pt. Education:  [x] Plans/Goals, Risks/Benefits discussed  [x] Home exercise program--Discussed PT POC, exercises listed below given to the pt on a handout for HEP    Method of Education: [x] Verbal  [x] Demo  [x] Written  Comprehension of Education:  [x] Verbalizes understanding. [x] Demonstrates understanding. [] Needs Review. [] Demonstrates/verbalizes understanding of HEP/Ed previously given. Treatment Plan:  [x] Therapeutic Exercise   53748  [] Iontophoresis: 4 mg/mL Dexamethasone Sodium Phosphate  mAmin  69940   [] Therapeutic Activity  74590 [x] Vasopneumatic cold with compression  72760    [x] Gait Training   98644 [] Ultrasound   50878   [x] Neuromuscular Re-education  89938 [x] Electrical Stimulation Unattended  06358   [x] Manual Therapy  64802 [] Electrical Stimulation Attended  97303   [x] Instruction in HEP  [] Lumbar/Cervical Traction  81708   [] Aquatic Therapy   58930 [x] Cold/hotpack    [] Massage   54117      [] Dry Needling, 1 or 2 muscles  75539   [] Biofeedback, first 15 minutes   16551  [] Biofeedback, additional 15 minutes   47226 [] Dry Needling, 3 or more muscles  52566     []  Medication allergies reviewed for use of    Dexamethasone Sodium Phosphate 4mg/ml     with iontophoresis treatments. Pt is not allergic. Frequency: 2 x/week for 12 visits        Todays Treatment:  Modalities:   Precautions:  Exercises:  Exercise Reps/ Time Weight/ Level Comments   Quad sets 10x     SLR 10x     LAQ 10x     Calf stretch on wall 3x 30\"                      Other:Manual: MFR to medial and lateral gastrocs as well as soleus followed by ankle pumps with pt still prone x 2minutes.       Specific

## 2021-03-24 ENCOUNTER — HOSPITAL ENCOUNTER (OUTPATIENT)
Dept: PHYSICAL THERAPY | Age: 46
Setting detail: THERAPIES SERIES
Discharge: HOME | End: 2021-03-24
Payer: COMMERCIAL

## 2021-03-24 PROCEDURE — 97110 THERAPEUTIC EXERCISES: CPT

## 2021-03-24 PROCEDURE — 97140 MANUAL THERAPY 1/> REGIONS: CPT

## 2021-03-24 NOTE — FLOWSHEET NOTE
[x] [facility]  [facility]  [address]  [phone]  F: [phone] [] [address]  [place] [address]   [address]  P: [phone]  F: [phone] [] [address] &  Therapy  [address]  P: [phone]  F: [phone] [] [address]  P: [phone]  F: [phone] [] [address]  [facility]   [address]   [place]: [phone]  F: [phone]      Physical Therapy Daily Treatment Note    Date:  3/24/2021  Patient Name:  Luda Gross    :  1975  MRN: [id number]  Physician: Rubina Madera DO                       Insurance: Toni Oviedo ( visits/year)  Medical Diagnosis:  Arthritis of left knee (M17.12 [ICD-10-CM])                    Rehab Codes: M25.562, M25.662, M25.672, M62.81, R26.2  Onset date: 20             Next 's appt. :  Visit# / total visits:     Cancels/No Shows: 0/0    Subjective:    Pain:  [] Yes  [x] No Location: L knee and calf Pain Rating: (0-10 scale) 0/10  Pain altered Tx:  [x] No  [] Yes  Action:  Comments: Patient arrives with no complaints of pain as she took Aleve prior to arrival. States that she has not done HEP but did walk around at the mall (half of it). Did a lot yesterday and then came home and had increased pain.     Objective:  Modalities:   Precautions:  Exercises:  Exercise Reps/ Time Weight/ Level Comments   Nustep 5 mins L4          Standing      Gastroc stretch 3x 30 sec wedge   HR 10x     Hip abduction 10x     Hip extension 10x           Seated      LAQs 10x     Hamstring curls 10x lime    Hamstring stretch 3x 30 sec stool         Supine      Quad sets 10x  3 sec    SLR 10x     SAQs 10x     Heel slides 10x     Adduction sets 10x 3 sec          Other:    MFR to medial and lateral gastrocs, soleus, articularis genu, and popliteus 12 minutes followed by ankle pumps with pt still prone x 2minutes. Treatment Charges: Mins Units   []  Modalities     [x]  Ther Exercise 47 3   [x]  Manual Therapy 12 1   []  Ther Activities     []  Aquatics     []  Vasocompression     []  Other     Total Treatment time 59 mins        Assessment: [x] Progressing toward goals. Initiated exercises per log this date to increase knee ROM and strengthening. Reviewed exercises previously given to patient as they are still new to patient. MFR releases to gastroc, soleus, articularis genu to increase knee ROM and decrease pain. Patient notes she feels better after treatment. [] No change. [] Other:  [x] Patient would continue to benefit from skilled physical therapy services in order to: address limited left knee ROM, tight left gastroc and soleous, left knee weakness, left hip weakness, and functional impairments including difficulty walking even short distances. STG: (to be met in 8 treatments)  1. ? Pain: Decrease left knee and calf pain to 8/10  2. ? ROM: Increase left knee to full, pain-free flexion and extension; Increase left ankle DF to 10°, PF to 50°  3. ? Strength: Increase left quads and hamstring strength to 4+/5, hip flex to 4/5   4. ? Function: Improve LEFS to 65% impairment  5. Patient to be independent with home exercise program as demonstrated by performance with correct form without cues. 6. Demonstrate Knowledge of fall prevention  LTG: (to be met in 12 treatments)  1. Pt will be able to walk a short distance without difficulty  2. Pt will be able to go up and down stairs with no more than moderate difficulty  3. Improve LEFS to 50% impairment                 Patient goals: \"To be able to be myself again. \"    Pt.  Education:  [x] Yes  [] No  [x] Reviewed Prior HEP/Ed  Method of Education: [x] Verbal  [x] Demo  [] Written  Comprehension of Education:  [x] [employer] understanding. [x] Demonstrates understanding. [x] Needs review. [] Demonstrates/verbalizes HEP/Ed previously given. Plan: [x] Continue current frequency toward long and short term goals.     [x] Specific Instructions for subsequent treatments: Left knee and hip strengthening; Left knee ROM; MFR and manual therapy to left calf      Time In: 8:30 am            Time Out: 9:34 am    Electronically signed by:  Kim Willis PTA

## 2021-03-25 NOTE — PROGRESS NOTES
0.50     Start date: 12/23/1982    Smokeless tobacco: Never Used    Alcohol use Yes      Comment: Occasionally       ALLERGIES    No Known Allergies    MEDICATIONS    Current Outpatient Prescriptions on File Prior to Encounter   Medication Sig Dispense Refill    doxycycline hyclate (VIBRAMYCIN) 100 MG capsule Take 1 capsule by mouth 2 times daily for 14 days 28 capsule 0    ondansetron (ZOFRAN ODT) 4 MG disintegrating tablet Take 1 tablet by mouth every 12 hours as needed for Nausea 6 tablet 0    famotidine (PEPCID) 20 MG tablet Take 1 tablet by mouth 2 times daily 30 tablet 0    amoxicillin-clavulanate (AUGMENTIN) 875-125 MG per tablet Take 1 tablet by mouth 2 times daily 13 tablet 0    metroNIDAZOLE (FLAGYL) 500 MG tablet Take 1 tablet by mouth 2 times daily 13 tablet 0    albuterol sulfate HFA (PROVENTIL HFA) 108 (90 BASE) MCG/ACT inhaler Inhale 1-2 puffs into the lungs every 4 hours as needed for Wheezing 1 Inhaler 0    docusate sodium (COLACE) 100 MG capsule Take 1 capsule by mouth 2 times daily 30 capsule 2    aspirin-acetaminophen-caffeine (EXCEDRIN MIGRAINE) 250-250-65 MG per tablet Take 1 tablet by mouth every 6 hours as needed for Pain.  acetaminophen (TYLENOL) 500 MG tablet Take 500 mg by mouth every 6 hours as needed for Pain.  benzocaine (ORAJEL) 10 % mucosal gel Take  by mouth as needed for Pain. Take by mouth as needed.  glipiZIDE (GLUCOTROL) 10 MG tablet Take 10 mg by mouth 2 times daily (before meals) Indications: patient not currently taking       lisinopril (PRINIVIL;ZESTRIL) 10 MG tablet Take 10 mg by mouth daily Indications: not currently taking       omeprazole (PRILOSEC) 20 MG capsule Take 20 mg by mouth daily Indications: not currently taking, patient states she has them        No current facility-administered medications on file prior to encounter. REVIEW OF SYSTEMS    Pertinent items are noted in HPI.     Objective:     Patient Active Problem List
0

## 2021-03-29 ENCOUNTER — HOSPITAL ENCOUNTER (OUTPATIENT)
Dept: PHYSICAL THERAPY | Age: 46
Setting detail: THERAPIES SERIES
Discharge: HOME | End: 2021-03-29
Payer: COMMERCIAL

## 2021-03-29 PROCEDURE — 97140 MANUAL THERAPY 1/> REGIONS: CPT

## 2021-03-29 PROCEDURE — 97110 THERAPEUTIC EXERCISES: CPT

## 2021-03-29 NOTE — FLOWSHEET NOTE
[x] [facility]) [facility] &  Therapy  [address]  [phone]  F: [phone] [] [address]  [place] [address]   [address]  P: [phone]  F: [phone] [] [address] &  Therapy  [address]  P: [phone]  F: [phone] [] [address]  P: [phone]  F: [phone] [] [address]  [facility]   [address]   [place]: [phone]  F: [phone]      Physical Therapy Daily Treatment Note    Date:  3/29/2021  Patient Name:  Catherine Draper    :  1975  MRN: [id number]  Physician: Jatin Barros DO                       Insurance: Deborah Isbell ( visits/year)  Medical Diagnosis:  Arthritis of left knee (M17.12 [ICD-10-CM])                    Rehab Codes: M25.562, M25.662, M25.672, M62.81, R26.2  Onset date: 20             Next Dr's appt. : 21  Visit# / total visits: 3/12    Cancels/No Shows: 0/0    Subjective:    Pain:  [] Yes  [x] No Location: L knee and calf Pain Rating: (0-10 scale) 8/10  Pain altered Tx:  [x] No  [] Yes  Action:  Comments: Patient arrives stating pain is 8/10 in calf this date. Over the weekend notes more pain in the knee. Soreness after last session.       Objective:  Modalities:   Precautions:  Exercises:  Exercise Reps/ Time Weight/ Level Comments   Nustep 5 mins L4          Standing      Gastroc stretch  3x 30 sec wedge   Soleus stretch  3x 30 sec Wedge   added   HR 10x     Hip abduction 10x     Hip extension 10x           Seated      LAQs 10x     Hamstring curls 10x lime    Hamstring stretch 3x 30 sec stool         Supine      Quad sets    10x  3 sec    SLR 10x     SAQs   10x     Heel slides    10x  Orange slide tube   Adduction sets 10x 3 sec          Other:    MFR to medial and lateral gastrocs, soleus, articularis genu, bicep femoris short head and popliteus 11 minutes followed by ankle pumps with pt in supine x 2minutes. Treatment Charges: Mins Units   []  Modalities     [x]  Ther Exercise 28 2   [x]  Manual Therapy 11 1   []  Ther Activities     []  Aquatics     []  Vasocompression     []  Other     Total Treatment time 39 mins        Assessment: [x] Progressing toward goals. Added soleus stretch to increase LE ROM as patient noted soreness in calves this date. Continued exercises per log secondary to noted soreness after last session. Continued MFR to increase fascia mobility. [] No change. [] Other:  [x] Patient would continue to benefit from skilled physical therapy services in order to: address limited left knee ROM, tight left gastroc and soleous, left knee weakness, left hip weakness, and functional impairments including difficulty walking even short distances. STG: (to be met in 8 treatments)  1. ? Pain: Decrease left knee and calf pain to 8/10  2. ? ROM: Increase left knee to full, pain-free flexion and extension; Increase left ankle DF to 10°, PF to 50°  3. ? Strength: Increase left quads and hamstring strength to 4+/5, hip flex to 4/5   4. ? Function: Improve LEFS to 65% impairment  5. Patient to be independent with home exercise program as demonstrated by performance with correct form without cues. 6. Demonstrate Knowledge of fall prevention  LTG: (to be met in 12 treatments)  1. Pt will be able to walk a short distance without difficulty  2. Pt will be able to go up and down stairs with no more than moderate difficulty  3. Improve LEFS to 50% impairment                 Patient goals: \"To be able to be myself again. \"    Pt. Education:  [x] Yes  [] No  [x] Reviewed Prior HEP/Ed  Method of Education: [x] Verbal  [x] Demo  [] Written  Comprehension of Education:  [x] Verbalizes understanding. [x] Demonstrates understanding. [x] Needs review.   [] Demonstrates/verbalizes HEP/Ed previously given. Plan: [x] Continue current frequency toward long and short term goals.     [x] Specific Instructions for subsequent treatments: add more standing exercises      Time In: 8:32 am            Time Out: 9:16 am    Electronically signed by:  Elder Mckeon PTA

## 2021-03-31 ENCOUNTER — HOSPITAL ENCOUNTER (OUTPATIENT)
Dept: PHYSICAL THERAPY | Age: 46
Setting detail: THERAPIES SERIES
Discharge: HOME | End: 2021-03-31
Payer: COMMERCIAL

## 2021-03-31 NOTE — FLOWSHEET NOTE
[x] [facility]) [facility] &  Therapy  [address]    [phone]  F: [phone]   [] [address]  [facility]   [address]  P: [phone]  F: [phone]  [] [place]  [address]  P: [phone]  F: [phone] [] [address]  P: [phone]  F: [phone]  [] [address]  [facility]   [address]   [place]: [phone]  F: [phone]   [] [facility]  [address]  [place]: [phone]   F: [phone]     Physical Therapy Cancel/No Show note    Date: 3/31/2021  Patient: Hanna Chowdary  : 1975  MRN: [id number]    Cancels/No Shows to date:     For today's appointment patient:    [x]  Cancelled    [] Rescheduled appointment    [] No-show     Reason given by patient:    []  Patient ill    [x]  Conflicting appointment    [] No transportation      [] Conflict with work    [] No reason given    [] Weather related    [] [id number]    [] Other:      Comments:        [x] Next appointment was confirmed    Electronically signed by: Judieth Schwab, PTA

## 2021-04-12 ENCOUNTER — TELEPHONE (OUTPATIENT)
Dept: INTERNAL MEDICINE | Age: 46
End: 2021-04-12

## 2021-04-12 DIAGNOSIS — L73.2 HIDRADENITIS SUPPURATIVA: ICD-10-CM

## 2021-04-12 NOTE — TELEPHONE ENCOUNTER
Refill request for Doxycycline. If appropriate please send medication(s) to patients pharmacy. Next appt: 4/23/2021      Health Maintenance   Topic Date Due    Diabetic retinal exam  Never done    COVID-19 Vaccine (1) Never done    Hepatitis B vaccine (1 of 3 - Risk 3-dose series) 09/01/2021 (Originally 11/17/1996)    DTaP/Tdap/Td vaccine (1 - Tdap) 09/01/2021 (Originally 11/17/1996)    Pneumococcal 0-64 years Vaccine (1 of 1 - PPSV23) 09/01/2021 (Originally 11/17/1983)    Diabetic microalbuminuria test  07/24/2021    Flu vaccine (Season Ended) 09/01/2021    Diabetic foot exam  03/01/2022    A1C test (Diabetic or Prediabetic)  03/01/2022    Lipid screen  03/01/2022    Cervical cancer screen  04/07/2022    Hepatitis C screen  Completed    HIV screen  Completed    Hepatitis A vaccine  Aged Out    Hib vaccine  Aged Out    Meningococcal (ACWY) vaccine  Aged Out       Hemoglobin A1C (%)   Date Value   03/01/2021 6.4   07/24/2020 6.6 (H)   02/18/2020 6.6 (H)             ( goal A1C is < 7)   Microalb/Crt.  Ratio (mcg/mg creat)   Date Value   07/24/2020 48 (H)     LDL Cholesterol (mg/dL)   Date Value   03/01/2021 81       (goal LDL is <100)   AST (U/L)   Date Value   05/11/2018 11     ALT (U/L)   Date Value   11/14/2019 15     BUN (mg/dL)   Date Value   11/14/2019 7     BP Readings from Last 3 Encounters:   03/01/21 139/86   10/13/20 123/75   07/31/20 118/75          (goal 120/80)          Patient Active Problem List:     Hyperlipidemia     Hypertension     Diabetes mellitus ([address]     GERD (gastroesophageal reflux disease)     Obesity, morbid, BMI 50 or higher (HCC)     Hidradenitis suppurativa     Sleep apnea     Perirectal abscess     Iron deficiency anemia     Dysmenorrhea     Menorrhagia     Acute cystitis without hematuria     Abnormal uterine bleeding (AUB)     Hysteroscopy, D&C, Myosure, Mirena IUD Insertion 11/18/19

## 2021-04-12 NOTE — TELEPHONE ENCOUNTER
PC to patient - while rescheduling patients appointment patient states that she is having a lot of lower extremity pain, mainly in her legs. Requesting something stronger for pain.

## 2021-04-13 ENCOUNTER — OFFICE VISIT (OUTPATIENT)
Dept: OBGYN | Age: 46
End: 2021-04-13
Payer: COMMERCIAL

## 2021-04-13 VITALS
WEIGHT: 293 LBS | DIASTOLIC BLOOD PRESSURE: 89 MMHG | BODY MASS INDEX: 61.52 KG/M2 | TEMPERATURE: 97 F | SYSTOLIC BLOOD PRESSURE: 136 MMHG | HEART RATE: 97 BPM

## 2021-04-13 DIAGNOSIS — G89.29 CHRONIC BILATERAL LOW BACK PAIN WITHOUT SCIATICA: ICD-10-CM

## 2021-04-13 DIAGNOSIS — N93.9 ABNORMAL UTERINE BLEEDING (AUB): Primary | ICD-10-CM

## 2021-04-13 DIAGNOSIS — M54.50 CHRONIC BILATERAL LOW BACK PAIN WITHOUT SCIATICA: ICD-10-CM

## 2021-04-13 DIAGNOSIS — Z01.818 PREOP TESTING: ICD-10-CM

## 2021-04-13 DIAGNOSIS — Z97.5 IUD (INTRAUTERINE DEVICE) IN PLACE: ICD-10-CM

## 2021-04-13 PROCEDURE — G8427 DOCREV CUR MEDS BY ELIG CLIN: HCPCS | Performed by: OBSTETRICS & GYNECOLOGY

## 2021-04-13 PROCEDURE — 4004F PT TOBACCO SCREEN RCVD TLK: CPT | Performed by: OBSTETRICS & GYNECOLOGY

## 2021-04-13 PROCEDURE — 99213 OFFICE O/P EST LOW 20 MIN: CPT | Performed by: OBSTETRICS & GYNECOLOGY

## 2021-04-13 PROCEDURE — G8417 CALC BMI ABV UP PARAM F/U: HCPCS | Performed by: OBSTETRICS & GYNECOLOGY

## 2021-04-13 RX ORDER — IBUPROFEN 800 MG/1
TABLET ORAL
Qty: 30 TABLET | Refills: 1 | Status: SHIPPED | OUTPATIENT
Start: 2021-04-13 | End: 2021-06-01

## 2021-04-13 RX ORDER — DOXYCYCLINE HYCLATE 100 MG/1
CAPSULE ORAL
Qty: 60 CAPSULE | Refills: 0 | Status: SHIPPED | OUTPATIENT
Start: 2021-04-13 | End: 2021-09-24 | Stop reason: SDUPTHER

## 2021-04-13 NOTE — PROGRESS NOTES
Nan Summers  2021    YOB: 1975          The patient was seen today. She is here regarding requesting removal of IUD  . Her bowels are regular and she is voiding without difficulty. HPI:  Nan Summers is a 39 y.o. female [id number] the patient has had monthly vaginal bleeding since her last visit. Periods last about 6 days. She uses 4 -6 pads daily. Though pt admits bleeding is lighter since the IUD was placed she has not appreciated as much improvement as she expected. She request removal of the Mirena device and other treatment for her heavy menses.   CBC last month showed mild anemia, 37.9      OB History    Para Term  AB Living   5 3 3 0 2 4   SAB TAB Ectopic Molar Multiple Live Births   1 1 0 0 1 4      # Outcome Date GA Lbr Mckinley/2nd Weight Sex Delivery Anes PTL Lv   5 Term      Vag-Spont   ARNOLDO   4 SAB            3 Term 18    F Vag-Spont   ARNOLDO   2 TAB            1A Term      Vag-Spont   ARNOLDO   1B Term      Vag-Spont   ARNOLDO       Past Medical History:   Diagnosis Date    Diabetes mellitus ([address]     I do not know    GERD (gastroesophageal reflux disease)     Hidradenitis suppurativa     Hyperlipidemia     Hypertension     PCP Catherine Flores NP, seen 10/2019    Obesity, morbid, BMI 50 or higher ([address]     Sleep apnea     CPAP use at 16       Past Surgical History:   Procedure Laterality Date    ABSCESS DRAINAGE  2016    BUTTOCK    DILATION AND CURETTAGE OF UTERUS N/A 2019    DILATATION AND CURETTAGE HYSTEROSCOPY, MYOSURE, MIRENA IUD INSERTION performed by Cuca Greene MD at [address] [place] [address] UNLISTED N/A 2018    RECTAL PERIRECTAL INCISION AND DRAINAGE performed by Renita Mccann DO at [address] [facility]       Family History   Problem Relation Age of Onset   Vale Sheldon Vision Loss Mother     Glaucoma Mother     Diabetes Father     High Blood Pressure Father     Glaucoma Father    Vale Sheldon Kidney Cancer Maternal Grandmother     Kidney stones Brother     No Known Problems Maternal Grandfather     Diabetes Paternal Grandmother     Other Brother         stomach problem       Social History     Socioeconomic History    Marital status: Single     Spouse name: Not on file    Number of children: 4    Years of education: 8th grade    Highest education level: Not on file   Occupational History    Occupation: unemployed     Comment: working on [employer] resource strain: Not on file    Food insecurity     Worry: Not on file     Inability: Not on file   [employer] needs     Medical: Not on file     Non-medical: Not on file   Tobacco Use    Smoking status: Current Every Day Smoker     Packs/day: 0.50     Years: 30.00     Pack years: 15.00     Types: Cigarettes    Smokeless tobacco: Never Used   Substance and Sexual Activity    Alcohol use: Not Currently     Comment: Occasionally    Drug use: Yes     Frequency: 1.5 times per week     Types: Marijuana     Comment: 4 x a month    Sexual activity: Yes     Partners: Male     Comment: 1 partner boyfriend   Lifestyle    Physical activity     Days per week: Not on file     Minutes per session: Not on file    Stress: Not on file   Relationships    Social connections     Talks on phone: Not on file     Gets together: Not on file     Attends Baptism service: Not on file     Active member of club or organization: Not on file     Attends meetings of clubs or organizations: Not on file     Relationship status: Not on file    Intimate partner violence     Fear of current or ex partner: Not on file     Emotionally abused: Not on file     Physically abused: Not on file     Forced sexual activity: Not on file   Other Topics Concern    Not on file   Social History Narrative    Lives with 4 kids age 24yo to 32year old         MEDICATIONS:  Current Outpatient Medications on File Prior to Visit   Medication Sig Dispense Refill    of 30 minutes. More than 50% of this visit was counseling and education regarding The primary encounter diagnosis was Abnormal uterine bleeding (AUB). Diagnoses of IUD (intrauterine device) in place and Preop testing were also pertinent to this visit. and Follow-up (Pt wants iud removed ) and Medication Refill (ibuprofen )   as well as  counseling on preventative health maintenance follow-up.

## 2021-04-13 NOTE — PATIENT INSTRUCTIONS
Patient Education        Operative Hysteroscopy: Before Your Procedure  What is an operative hysteroscopy? An operative hysteroscopy is a procedure to find and treat problems with your uterus. It may be done to remove growths from the uterus. It may also be used to treat fertility problems or abnormal bleeding. The doctor will guide a lighted tube through the cervix and into the uterus. This tube is called a hysteroscope, or scope. The doctor will fill your uterus with liquid. This makes it easier to see the inside of your uterus with the scope. Then the doctor will put tools through the scope to do the procedure. Most of the liquid that the doctor puts in will flow out when the scope is removed. You will most likely go home the same day. Many women are able to go back to work the next day. But it depends on what was done and the type of work you do. How do you prepare for the procedure? Preparing for the procedure  · Understand exactly what procedure is planned, along with the risks, benefits, and other options. · If you take aspirin or some other blood thinner, ask your doctor if you should stop taking it before your procedure. Make sure that you understand exactly what your doctor wants you to do. These medicines increase the risk of bleeding. · Tell your doctor ALL the medicines, vitamins, supplements, and herbal remedies you take. Some may increase the risk of problems during your procedure. Your doctor will tell you if you should stop taking any of them before the procedure and how soon to do it. · Make sure your doctor and the hospital have a copy of your advance directive. If you don't have one, you may want to prepare one. It lets others know your health care wishes. It's a good thing to have before any type of surgery or procedure. What happens on the day of the procedure? · Follow the instructions exactly about when to stop eating and drinking.  If you don't, your procedure may be canceled. If your doctor has instructed you to take your medicines on the day of the procedure, take them with only a sip of water.     · Take a bath or shower before you come in for your procedure. Do not apply lotions, perfumes, deodorants, or nail polish.     · Take off all jewelry and piercings. And take out contact lenses, if you wear them. At the hospital or surgery center   · Bring a picture ID.     · You will be kept comfortable and safe by your anesthesia provider. The anesthesia may make you sleep. Or it may just numb the area being worked on.     · The procedure will probably take less than an hour. When should you call your doctor? · You have questions or concerns.     · You don't understand how to prepare for your procedure.     · You become ill before the procedure (such as fever, flu, or a cold).     · You need to reschedule or have changed your mind about having the procedure. Where can you learn more? Go to https://[email or web address].GraphLab. org and sign in to your Augur account. Enter S084 in the Adform box to learn more about \"Operative Hysteroscopy: Before Your Procedure. \"     If you do not have an account, please click on the \"Sign Up Now\" link. Current as of: July 17, 2020               Content Version: 12.8  © 8977-2439 Healthwise, Incorporated. Care instructions adapted under license by [facility] ([facility]). If you have questions about a medical condition or this instruction, always ask your healthcare professional. [place] [address] any warranty or liability for your use of this information. Patient Education        Endometrial Ablation: Before Your Procedure  What is endometrial ablation? Endometrial ablation is a type of procedure that's often used to treat heavy menstrual bleeding. It can also be used for other types of bleeding in the uterus. It's not recommended if you plan to get pregnant.   Ablation works by destroying the lining any of them before the procedure and how soon to do it.     · Make sure your doctor and the hospital have a copy of your advance directive. If you don't have one, you may want to prepare one. It lets others know your health care wishes. It's a good thing to have before any type of surgery or procedure. What happens on the day of the procedure? · Follow the instructions exactly about when to stop eating and drinking. If you don't, your procedure may be canceled. If your doctor told you to take your medicines on the day of the procedure, take them with only a sip of water.     · Take a bath or shower before you come in for your procedure. Do not apply lotions, perfumes, deodorants, or nail polish.     · Take off all jewelry and piercings. And take out contact lenses, if you wear them. At the doctor's office or hospital   · Bring a picture ID.     · You will be kept comfortable and safe by your anesthesia provider. You may get medicine that relaxes you or puts you in a light sleep.     · The procedure will take less than an hour. When should you call your doctor? · You have questions or concerns.     · You do not understand how to prepare for your procedure.     · You become ill before the procedure (such as fever, flu, or a cold).     · You need to reschedule or have changed your mind about having the procedure. Where can you learn more? Go to [email or web address]://[email or web address] Gaming.Shoulder Options. org and sign in to your GameCrush account. Enter B335 in the GigaTrustChristianaCare box to learn more about \"Endometrial Ablation: Before Your Procedure. \"     If you do not have an account, please click on the \"Sign Up Now\" link. Current as of: July 17, 2020               Content Version: 12.8  © 2006-2021 Healthwise, Incorporated. Care instructions adapted under license by [facility] ([facility]).  If you have questions about a medical condition or this instruction, always ask your healthcare professional. Juani Mcfarlane

## 2021-04-20 ENCOUNTER — TELEPHONE (OUTPATIENT)
Dept: OBGYN | Age: 46
End: 2021-04-20

## 2021-04-23 NOTE — TELEPHONE ENCOUNTER
Called patient to give her her surgery info.   She states she is currently driving and asked that I call her back in 10 minutes

## 2021-05-10 RX ORDER — SODIUM CHLORIDE, SODIUM LACTATE, POTASSIUM CHLORIDE, CALCIUM CHLORIDE 600; 310; 30; 20 MG/100ML; MG/100ML; MG/100ML; MG/100ML
1000 INJECTION, SOLUTION INTRAVENOUS CONTINUOUS
Status: CANCELLED | OUTPATIENT
Start: 2021-05-10

## 2021-05-10 NOTE — PROGRESS NOTES
Anesthesia Focused Assessment    STOP-BANG Sleep Apnea Questionnaire    Prior + Covid-19 test? No      SNORE loudly (heard through closed doors)? Yes  TIRED, fatigued, sleepy during daytime? Yes  OBSERVED stopping breathing during sleep? Yes  High blood PRESSURE or being treated? No    BMI over 35? Yes  AGE over 48? No  NECK circumference over 16\"? Yes  GENDER (male)? No             Total 5  High risk 5-8  Intermediate risk 3-4  Low risk 0-2    ----------------------------------------------------------------------------------------------------------------------  DALILA:                                             yes  If yes, machine?:                          Has used c-pap, no longer has machine, due for new sleep study     Type 1 DM:                                   no  T2DM:                                           yes    Coronary Artery Disease:             no  Hypertension:                               Yes, no Rx, is keeping log of her BPs to report to PCP, advised her to f/u with PCP  Defib / AICD / Pacemaker:           no    Renal Failure:                               no  If yes, on dialysis? :                           Active smoker:                              1/2 ppd  Drinks Alcohol:                              occasional  Illicit drugs:                                    Marijuana    Dentition:                                      Many missing, does not use dentures    Past Medical History:   Diagnosis Date    Abnormal uterine bleeding (AUB)     Anemia     Arthritis     KNEE AND BACK    Asthma     Diabetes mellitus ([facility] [address]     I do not know    GERD (gastroesophageal reflux disease)     Hidradenitis suppurativa     History of blood transfusion 1997    Hyperlipidemia     Hypertension     PCP Mojgan Salas NP, seen 10/2019    Leg swelling     Obesity, morbid, BMI 50 or higher ([facility] [address] [facility]     Sleep apnea     needs new machine    Wears dentures     never wears them  Wellness examination 05/11/2021    pcp-[facility]-last visit april 2021         Patient was evaluated in PAT & anesthesia guidelines were applied. NPO guidelines, medication instructions and scheduled arrival time were reviewed with patient. Hx of anesthesia complications:  no  Family hx of anesthesia complications:  no                                                                                                                     Anesthesia contacted:   Yes. I spoke with Dr. Natasha Hoffman Patient history and pertinent findings reviewed: elevated BP, no Rx, keeping log, to f/u with PCP.   Medical or cardiac clearance ordered: dave HERNANDEZ APRN-CNP  5/10/21  4:20 PM

## 2021-05-11 ENCOUNTER — HOSPITAL ENCOUNTER (OUTPATIENT)
Dept: PREADMISSION TESTING | Age: 46
Discharge: HOME | End: 2021-05-15
Payer: COMMERCIAL

## 2021-05-11 VITALS
WEIGHT: 293 LBS | DIASTOLIC BLOOD PRESSURE: 103 MMHG | RESPIRATION RATE: 18 BRPM | HEIGHT: 60 IN | OXYGEN SATURATION: 97 % | BODY MASS INDEX: 57.52 KG/M2 | SYSTOLIC BLOOD PRESSURE: 154 MMHG | HEART RATE: 65 BPM | TEMPERATURE: 97.2 F

## 2021-05-11 DIAGNOSIS — N93.9 ABNORMAL UTERINE BLEEDING (AUB): ICD-10-CM

## 2021-05-11 DIAGNOSIS — Z01.818 PREOP TESTING: ICD-10-CM

## 2021-05-11 LAB
ABSOLUTE EOS #: 0.25 K/UL (ref 0–0.44)
ABSOLUTE IMMATURE GRANULOCYTE: <0.03 K/UL (ref 0–0.3)
ABSOLUTE LYMPH #: 2.58 K/UL (ref 1.1–3.7)
ABSOLUTE MONO #: 0.61 K/UL (ref 0.1–1.2)
ALP BLD-CCNC: 54 U/L (ref 35–104)
ALT SERPL-CCNC: 12 U/L (ref 5–33)
ANION GAP SERPL CALCULATED.3IONS-SCNC: 8 MMOL/L (ref 9–17)
BASOPHILS # BLD: 1 % (ref 0–2)
BASOPHILS ABSOLUTE: 0.05 K/UL (ref 0–0.2)
BUN BLDV-MCNC: 12 MG/DL (ref 6–20)
BUN/CREAT BLD: ABNORMAL (ref 9–20)
CALCIUM SERPL-MCNC: 9.1 MG/DL (ref 8.6–10.4)
CHLORIDE BLD-SCNC: 103 MMOL/L (ref 98–107)
CO2: 28 MMOL/L (ref 20–31)
CREAT SERPL-MCNC: 0.46 MG/DL (ref 0.5–0.9)
DIFFERENTIAL TYPE: ABNORMAL
EOSINOPHILS RELATIVE PERCENT: 3 % (ref 1–4)
GFR AFRICAN AMERICAN: >60 ML/MIN
GFR NON-AFRICAN AMERICAN: >60 ML/MIN
GFR SERPL CREATININE-BSD FRML MDRD: ABNORMAL ML/MIN/{1.73_M2}
GFR SERPL CREATININE-BSD FRML MDRD: ABNORMAL ML/MIN/{1.73_M2}
GLUCOSE BLD-MCNC: 99 MG/DL (ref 70–99)
HCT VFR BLD CALC: 38.5 % (ref 36.3–47.1)
HEMOGLOBIN: 12 G/DL (ref 11.9–15.1)
IMMATURE GRANULOCYTES: 0 %
LYMPHOCYTES # BLD: 32 % (ref 24–43)
MCH RBC QN AUTO: 29 PG (ref 25.2–33.5)
MCHC RBC AUTO-ENTMCNC: 31.2 G/DL (ref 28.4–34.8)
MCV RBC AUTO: 93 FL (ref 82.6–102.9)
MONOCYTES # BLD: 8 % (ref 3–12)
NRBC AUTOMATED: 0 PER 100 WBC
PDW BLD-RTO: 15.6 % (ref 11.8–14.4)
PLATELET # BLD: 249 K/UL (ref 138–453)
PLATELET ESTIMATE: ABNORMAL
PMV BLD AUTO: 11.6 FL (ref 8.1–13.5)
POTASSIUM SERPL-SCNC: 4.1 MMOL/L (ref 3.7–5.3)
RBC # BLD: 4.14 M/UL (ref 3.95–5.11)
RBC # BLD: ABNORMAL 10*6/UL
SEG NEUTROPHILS: 56 % (ref 36–65)
SEGMENTED NEUTROPHILS ABSOLUTE COUNT: 4.53 K/UL (ref 1.5–8.1)
SODIUM BLD-SCNC: 139 MMOL/L (ref 135–144)
WBC # BLD: 8 K/UL (ref 3.5–11.3)
WBC # BLD: ABNORMAL 10*3/UL

## 2021-05-11 PROCEDURE — 80048 BASIC METABOLIC PNL TOTAL CA: CPT

## 2021-05-11 PROCEDURE — 84460 ALANINE AMINO (ALT) (SGPT): CPT

## 2021-05-11 PROCEDURE — 85025 COMPLETE CBC W/AUTO DIFF WBC: CPT

## 2021-05-11 PROCEDURE — 36415 COLL VENOUS BLD VENIPUNCTURE: CPT

## 2021-05-11 PROCEDURE — 84075 ASSAY ALKALINE PHOSPHATASE: CPT

## 2021-05-11 PROCEDURE — 93005 ELECTROCARDIOGRAM TRACING: CPT

## 2021-05-12 LAB
EKG ATRIAL RATE: 62 BPM
EKG P AXIS: 56 DEGREES
EKG P-R INTERVAL: 156 MS
EKG Q-T INTERVAL: 394 MS
EKG QRS DURATION: 78 MS
EKG QTC CALCULATION (BAZETT): 399 MS
EKG R AXIS: -8 DEGREES
EKG T AXIS: -2 DEGREES
EKG VENTRICULAR RATE: 62 BPM

## 2021-05-12 PROCEDURE — 93010 ELECTROCARDIOGRAM REPORT: CPT | Performed by: INTERNAL MEDICINE

## 2021-05-13 DIAGNOSIS — G89.29 CHRONIC BILATERAL LOW BACK PAIN WITHOUT SCIATICA: ICD-10-CM

## 2021-05-13 DIAGNOSIS — M54.50 CHRONIC BILATERAL LOW BACK PAIN WITHOUT SCIATICA: ICD-10-CM

## 2021-05-13 RX ORDER — ACETAMINOPHEN 500 MG
TABLET ORAL
Qty: 30 TABLET | Refills: 0 | Status: SHIPPED | OUTPATIENT
Start: 2021-05-13 | End: 2021-06-01

## 2021-05-13 RX ORDER — TIZANIDINE 4 MG/1
4 TABLET ORAL EVERY 8 HOURS PRN
Qty: 90 TABLET | Refills: 0 | Status: SHIPPED | OUTPATIENT
Start: 2021-05-13 | End: 2021-09-24 | Stop reason: SDUPTHER

## 2021-05-13 RX ORDER — ISOPROPYL ALCOHOL 0.75 G/1
SWAB TOPICAL
Qty: 100 EACH | Refills: 0 | Status: SHIPPED | OUTPATIENT
Start: 2021-05-13 | End: 2021-09-24 | Stop reason: ALTCHOICE

## 2021-05-20 ENCOUNTER — HOSPITAL ENCOUNTER (OUTPATIENT)
Dept: LAB | Age: 46
Setting detail: SPECIMEN
Discharge: HOME | End: 2021-05-20
Payer: COMMERCIAL

## 2021-05-20 DIAGNOSIS — Z20.822 COVID-19 RULED OUT BY LABORATORY TESTING: Primary | ICD-10-CM

## 2021-05-20 PROCEDURE — U0005 INFEC AGEN DETEC AMPLI PROBE: HCPCS

## 2021-05-20 PROCEDURE — U0003 INFECTIOUS AGENT DETECTION BY NUCLEIC ACID (DNA OR RNA); SEVERE ACUTE RESPIRATORY SYNDROME CORONAVIRUS 2 (SARS-COV-2) (CORONAVIRUS DISEASE [COVID-19]), AMPLIFIED PROBE TECHNIQUE, MAKING USE OF HIGH THROUGHPUT TECHNOLOGIES AS DESCRIBED BY CMS-2020-01-R: HCPCS

## 2021-05-21 ENCOUNTER — TELEPHONE (OUTPATIENT)
Dept: OBGYN | Age: 46
End: 2021-05-21

## 2021-05-21 LAB
SARS-COV-2: ABNORMAL
SARS-COV-2: DETECTED
SOURCE: ABNORMAL

## 2021-05-21 NOTE — TELEPHONE ENCOUNTER
Pt informed of positive COVID result from PAT. She is asymptomatic. S/S of COVID 19 reviewed. Pt is to call PCP or go to ED if she experiences any SOB or fever not relieved with tylenol. She was instructed to self isolate for the next 10 days.    Our office will call to reschedule surgery after 5/31/21

## 2021-05-24 ENCOUNTER — TELEPHONE (OUTPATIENT)
Dept: INTERNAL MEDICINE | Age: 46
End: 2021-05-24

## 2021-05-24 ENCOUNTER — VIRTUAL VISIT (OUTPATIENT)
Dept: INTERNAL MEDICINE | Age: 46
End: 2021-05-24
Payer: COMMERCIAL

## 2021-05-24 DIAGNOSIS — U07.1 COVID-19: ICD-10-CM

## 2021-05-24 DIAGNOSIS — I10 ESSENTIAL HYPERTENSION: Primary | ICD-10-CM

## 2021-05-24 PROCEDURE — 99442 PR PHYS/QHP TELEPHONE EVALUATION 11-20 MIN: CPT | Performed by: NURSE PRACTITIONER

## 2021-05-24 RX ORDER — AMLODIPINE BESYLATE 5 MG/1
5 TABLET ORAL DAILY
Qty: 30 TABLET | Refills: 3 | Status: SHIPPED | OUTPATIENT
Start: 2021-05-24 | End: 2021-06-01 | Stop reason: SDUPTHER

## 2021-05-24 SDOH — ECONOMIC STABILITY: FOOD INSECURITY: WITHIN THE PAST 12 MONTHS, THE FOOD YOU BOUGHT JUST DIDN'T LAST AND YOU DIDN'T HAVE MONEY TO GET MORE.: NEVER TRUE

## 2021-05-24 ASSESSMENT — ENCOUNTER SYMPTOMS
BLURRED VISION: 0
ORTHOPNEA: 0
SHORTNESS OF BREATH: 0

## 2021-05-24 ASSESSMENT — SOCIAL DETERMINANTS OF HEALTH (SDOH): HOW HARD IS IT FOR YOU TO PAY FOR THE VERY BASICS LIKE FOOD, HOUSING, MEDICAL CARE, AND HEATING?: NOT HARD AT ALL

## 2021-05-24 NOTE — TELEPHONE ENCOUNTER
The patient called and is requesting Stacie Gaming to give her a call. The writer tried to help the patient but she refused to speak with her, she said she needs to speak with Stacie Gaming. Writer tried to schedule an appointment with the patient but there is nothing available this week so she could talk with Stacie Gaming.

## 2021-05-24 NOTE — PROGRESS NOTES
Melani Isaacs is a 39 y.o. female evaluated via telephone on 5/24/2021. Consent:  She and/or health care decision maker is aware that that she may receive a bill for this telephone service, depending on her insurance coverage, and has provided verbal consent to proceed: Yes      Documentation:  I communicated with the patient and/or health care decision maker about:    Hypertension  This is a new problem. The current episode started more than 1 month ago. The problem is unchanged. The problem is uncontrolled. Pertinent negatives include no anxiety, blurred vision, chest pain, headaches, malaise/fatigue, neck pain, orthopnea, palpitations, peripheral edema, PND, shortness of breath or sweats. Agents associated with hypertension include NSAIDs. Risk factors for coronary artery disease include diabetes mellitus, dyslipidemia, smoking/tobacco exposure and obesity. Past treatments include nothing. The current treatment provides no improvement. Compliance problems include diet and exercise. There is no history of kidney disease or heart failure. Details of this discussion including any medical advice provided:   Reviewed blood pressure readings. Will start medication and continue to monitor. Pre procedure COVID test came back positive last week. She has been asymptomatic. The procedure was cancelled and she was advised to quarantine for 14 days. Multiple questions about COVID asked and answered. Verbalizes understanding. Will re check nest week. I affirm this is a Patient Initiated Episode with a Patient who has not had a related appointment within my department in the past 7 days or scheduled within the next 24 hours. Patient identification was verified at the start of the visit: Yes    Total Time: minutes: 11-20 minutes    1. Essential hypertension  - amLODIPine (NORVASC) 5 MG tablet; Take 1 tablet by mouth daily  Dispense: 30 tablet; Refill: 3    2.  COVID-19  - continue quarantine  - re

## 2021-05-24 NOTE — PATIENT INSTRUCTIONS
Return To Clinic 6/1/2021 for 1 week follow up via [employer]. The medication list included in this document is our record of what you are currently taking, including any changes that were made at today's visit. If you find any differences when compared to your medications at home, or have any questions that were not answered at your visit, please contact the office. It is very important for your care that you keep your appointment. If for some reason you are unable to keep your appointment, it is equally important that you call our office at [phone] to cancel your appointment and reschedule. Failure to do so may result in your termination from our practice. After Visit Summary mailed to the patient along with appointment card and all other paperwork/orders. Medications have been e-scribed to pharmacy of patients choice.      -SHERRY Calderon

## 2021-06-01 ENCOUNTER — TELEMEDICINE (OUTPATIENT)
Dept: INTERNAL MEDICINE | Age: 46
End: 2021-06-01
Payer: COMMERCIAL

## 2021-06-01 DIAGNOSIS — U07.1 COVID-19: ICD-10-CM

## 2021-06-01 DIAGNOSIS — I10 ESSENTIAL HYPERTENSION: Primary | ICD-10-CM

## 2021-06-01 PROCEDURE — 99213 OFFICE O/P EST LOW 20 MIN: CPT | Performed by: NURSE PRACTITIONER

## 2021-06-01 PROCEDURE — G8427 DOCREV CUR MEDS BY ELIG CLIN: HCPCS | Performed by: NURSE PRACTITIONER

## 2021-06-01 RX ORDER — AMLODIPINE BESYLATE 10 MG/1
10 TABLET ORAL DAILY
Qty: 30 TABLET | Refills: 3 | Status: SHIPPED | OUTPATIENT
Start: 2021-06-01 | End: 2021-09-24 | Stop reason: SDUPTHER

## 2021-06-01 NOTE — PROGRESS NOTES
2021    TELEHEALTH EVALUATION -- Audio/Visual (During HQEWL-68 public health emergency)    HPI:    Lori Manzano (:  1975) has requested an audio/video evaluation for the following concern(s):    HPI  Hypertension:  Home blood pressure monitoring: Yes - reading reviwed. She is not adherent to a low sodium diet. Patient denies chest pain, shortness of breath and palpitations. Antihypertensive medication side effects: no medication side effects noted. Use of agents associated with hypertension: none. Today is her last day of quarantine due to a positive COVID swab. She would like to be retested so she can schedule her hysterectomy. Sodium (mmol/L)   Date Value   2021 139    BUN (mg/dL)   Date Value   2021 12    Glucose (mg/dL)   Date Value   2021 99      Potassium (mmol/L)   Date Value   2021 4.1    CREATININE (mg/dL)   Date Value   2021 0.46 (L)           Review of Systems   All other systems reviewed and are negative. Prior to Visit Medications    Medication Sig Taking?  Authorizing Provider   amLODIPine (NORVASC) 10 MG tablet Take 1 tablet by mouth daily Yes JAQUELIN Abbasi CNP   Alcohol Swabs (B-D SINGLE USE SWABS REGULAR) PADS USE TO CLEANSE AREA AS NEEDED Yes JAQUELIN Abbasi CNP   tiZANidine (ZANAFLEX) 4 MG tablet Take 1 tablet by mouth every 8 hours as needed (back pain) Yes JAQUELIN Abbasi CNP   doxycycline hyclate (VIBRAMYCIN) 100 MG capsule take 1 capsule by mouth twice a day with food Yes JAQUELIN Abbasi CNP   metFORMIN (GLUCOPHAGE) 500 MG tablet Take 1 tablet by mouth daily (with breakfast) Yes JAQUELIN Abbasi CNP   Blood Pressure KIT Use to monitor blood pressure daily and as needed Yes JAQUELIN Abbasi CNP   benzoyl peroxide 5 % external liquid Wash face, chest and back 1-2 times daily Yes JAQUELIN Abbasi CNP   clindamycin (CLEOCIN T) 1 % lotion Apply to affected areas daily Yes Juanell Schilder, APRN - CNP       Social History     Tobacco Use    Smoking status: Current Every Day Smoker     Packs/day: 0.50     Years: 30.00     Pack years: 15.00     Types: Cigarettes    Smokeless tobacco: Never Used   Vaping Use    Vaping Use: Never used   Substance Use Topics    Alcohol use: Not Currently     Comment: Occasionally    Drug use: Yes     Frequency: 1.5 times per week     Types: Marijuana     Comment: 4 x a month        No Known Allergies  Past Medical History:   Diagnosis Date    Abnormal uterine bleeding (AUB)     Anemia     Arthritis     KNEE AND BACK    Asthma     Diabetes mellitus (HCC)     GERD (gastroesophageal reflux disease)     Hidradenitis suppurativa     History of blood transfusion 1997    Hyperlipidemia     Hypertension     Leg swelling     Obesity, morbid, BMI 50 or higher ([facility] [address]     Sleep apnea     needs new machine    Wears dentures     never wears them     Current Outpatient Medications   Medication Sig Dispense Refill    amLODIPine (NORVASC) 10 MG tablet Take 1 tablet by mouth daily 30 tablet 3    Alcohol Swabs (B-D SINGLE USE SWABS REGULAR) PADS USE TO CLEANSE AREA AS NEEDED 100 each 0    tiZANidine (ZANAFLEX) 4 MG tablet Take 1 tablet by mouth every 8 hours as needed (back pain) 90 tablet 0    doxycycline hyclate (VIBRAMYCIN) 100 MG capsule take 1 capsule by mouth twice a day with food 60 capsule 0    metFORMIN (GLUCOPHAGE) 500 MG tablet Take 1 tablet by mouth daily (with breakfast) 30 tablet 3    Blood Pressure KIT Use to monitor blood pressure daily and as needed 1 kit 0    benzoyl peroxide 5 % external liquid Wash face, chest and back 1-2 times daily 227 g 3    clindamycin (CLEOCIN T) 1 % lotion Apply to affected areas daily 60 mL 3     No current facility-administered medications for this visit.          PHYSICAL EXAMINATION:  [ INSTRUCTIONS:  \"[x]\" Indicates a positive item  \"[]\" Indicates a negative item  -- DELETE ALL ITEMS NOT EXAMINED]  Vital Signs: (As obtained by patient/caregiver or practitioner observation)    Blood pressure-  Heart rate-    Respiratory rate-    Temperature-  Pulse oximetry-     Constitutional: [x] Appears well-developed and well-nourished [x] No apparent distress      [] Abnormal-   Mental status  [x] Alert and awake  [x] Oriented to person/place/time [x]Able to follow commands      Eyes:  EOM    []  Normal  [] Abnormal-  Sclera  [x]  Normal  [] Abnormal -         Discharge [x]  None visible  [] Abnormal -    HENT:   [x] Normocephalic, atraumatic. [] Abnormal   [] Mouth/Throat: Mucous membranes are moist.     External Ears [x] Normal  [] Abnormal-     Neck: [x] No visualized mass     Pulmonary/Chest: [x] Respiratory effort normal.  [x] No visualized signs of difficulty breathing or respiratory distress        [] Abnormal-      Musculoskeletal:   [] Normal gait with no signs of ataxia         [x] Normal range of motion of neck        [] Abnormal-       Neurological:        [x] No Facial Asymmetry (Cranial nerve 7 motor function) (limited exam to video visit)          [x] No gaze palsy        [] Abnormal-         Skin:        [x] No significant exanthematous lesions or discoloration noted on facial skin         [] Abnormal-            Psychiatric:       [x] Normal Affect [x] No Hallucinations        [] Abnormal-     Other pertinent observable physical exam findings-     ASSESSMENT/PLAN:  1. Essential hypertension  - continue self monitoring and logging of BP  - INCREASEamLODIPine (NORVASC) 10 MG tablet; Take 1 tablet by mouth daily  Dispense: 30 tablet; Refill: 3    2. COVID-19  - COVID-19; Future      Return in about 2 weeks (around 6/15/2021) for review of BP logs. Hanna Chowdary is a 39 y.o. female being evaluated by a Virtual Visit (video visit) encounter to address concerns as mentioned above. A caregiver was present when appropriate.  Due to this being a TeleHealth encounter (During LRZDM-18 public health emergency), evaluation of the following organ systems was limited: Vitals/Constitutional/EENT/Resp/CV/GI//MS/Neuro/Skin/Heme-Lymph-Imm. Pursuant to the emergency declaration under the [facility], [address] [place] [address] authority and the [employer] Act, this Virtual Visit was conducted with patient's (and/or legal guardian's) consent, to reduce the patient's risk of exposure to COVID-19 and provide necessary medical care. The patient (and/or legal guardian) has also been advised to contact this office for worsening conditions or problems, and seek emergency medical treatment and/or call 911 if deemed necessary. Patient identification was verified at the start of the visit: Yes    Total time spent on this encounter: Not billed by time    Services were provided through a video synchronous discussion virtually to substitute for in-person clinic visit. Patient and provider were located at their individual homes. --JAQUELIN Layne - CNP on 6/1/2021 at 4:15 PM    An electronic signature was used to authenticate this note.

## 2021-06-01 NOTE — PROGRESS NOTES
Blood Pressure Log :     05/25 missed    05/26 151/89    05/27 170/102    05/28 155/99    05/29 144/86    05/30 118/46    05/31 142/89

## 2021-06-01 NOTE — PATIENT INSTRUCTIONS
Return To Clinic 6/18/2021 for 2 week follow up via [employer]. The medication list included in this document is our record of what you are currently taking, including any changes that were made at today's visit. If you find any differences when compared to your medications at home, or have any questions that were not answered at your visit, please contact the office. It is very important for your care that you keep your appointment. If for some reason you are unable to keep your appointment, it is equally important that you call our office at [phone] to cancel your appointment and reschedule. Failure to do so may result in your termination from our practice. After Visit Summary mailed to the patient along with appointment card and all other paperwork/orders. Medications have been e-scribed to pharmacy of patients choice. Your physician placed an order for COVID-19 testing. You may go to any of the following [facility] to get this completed:    [facility] [address], [place]   [phone]   M-F 8a-8p DFF 10a-4p [facility]   [address], [address] [facility]   [phone]  M-F 9a-6p Sat 10a-4p [facility] [address]  [phone]  M-F 8a-8p Sat and Sun 10a-4p    [address] [place]   [address] [address]   [phone]  M-F 8a-8P Sat and Sun 10a-4p    -LSHERRY Ann

## 2021-06-07 ENCOUNTER — OFFICE VISIT (OUTPATIENT)
Dept: ORTHOPEDIC SURGERY | Age: 46
End: 2021-06-07
Payer: COMMERCIAL

## 2021-06-07 VITALS — HEIGHT: 60 IN | BODY MASS INDEX: 57.52 KG/M2 | WEIGHT: 293 LBS

## 2021-06-07 DIAGNOSIS — M17.12 ARTHRITIS OF LEFT KNEE: Primary | ICD-10-CM

## 2021-06-07 PROCEDURE — G8427 DOCREV CUR MEDS BY ELIG CLIN: HCPCS | Performed by: ORTHOPAEDIC SURGERY

## 2021-06-07 PROCEDURE — G8417 CALC BMI ABV UP PARAM F/U: HCPCS | Performed by: ORTHOPAEDIC SURGERY

## 2021-06-07 PROCEDURE — 4004F PT TOBACCO SCREEN RCVD TLK: CPT | Performed by: ORTHOPAEDIC SURGERY

## 2021-06-07 PROCEDURE — 99214 OFFICE O/P EST MOD 30 MIN: CPT | Performed by: ORTHOPAEDIC SURGERY

## 2021-06-07 ASSESSMENT — ENCOUNTER SYMPTOMS
CHEST TIGHTNESS: 0
ABDOMINAL PAIN: 0
VOMITING: 0
APNEA: 0
COUGH: 0

## 2021-06-07 NOTE — PROGRESS NOTES
[facility]  [facility]  [address] [place] [address]  Dept: [phone]  Dept Fax: [phone]        Ambulatory Follow Up      Subjective:   Yu Johnson is a 39y.o. year old female who presents to our office today for routine followup regarding her   1. Arthritis of left knee    . Chief Complaint   Patient presents with    Knee Pain     left       HPI- Yu Johnson  is a 39 y.o.   female who presents today in follow for left knee arthritis. The patient was last seen on 3/15/2021 and underwent treatment in the form of formal therapy for 6 weeks. The patient states that formal therapy wasn't helping so she started doing a home exercise program instead of going to formal therapy. The patient notes 10% improvement with the previous treatment. The patient feels like she is having the most pain on the lateral aspect of the left knee. The patient reports that occasionally the knee locks up and gives out on her. The patient is not interested in corticosteroid injections at this time. Review of Systems   Constitutional: Negative for chills and fever. Respiratory: Negative for apnea, cough and chest tightness. Cardiovascular: Negative for chest pain and palpitations. Gastrointestinal: Negative for abdominal pain and vomiting. Genitourinary: Negative for difficulty urinating. Musculoskeletal: Positive for arthralgias (left knee). Negative for gait problem, joint swelling and myalgias. Neurological: Negative for dizziness, weakness and numbness. I have reviewed the CC, HPI, ROS, PMH, FHX, Social History, and if not present in this note, I have reviewed in the patient's chart. I agree with the documentation provided by other staff and have reviewed their documentation prior to providing my signature indicating agreement.     Objective :   Ht 5' (1.524 m)   Wt (!) 310 lb 6.4 oz (140.8 kg)   BMI 60.62 kg/m²  Body mass index is 60.62 kg/m². General: Jeramie Osei is a 39 y.o. female who is alert and oriented and sitting comfortably in our office. Ortho Exam  MS:  Evaluation of the Left knee shows no erythema, warmth, skin lesions, signs of infection. mild Knee effusion is appreciated. Patient has full range of motion of the knee. Tenderness over the lateral joint line is appreciated. Patient has a negative patellar grind sign and a negative patellar apprehension sign. There is no instability with varus and valgus stress applied at 0 and 30° of flexion. A negative anterior drawer and Lachman's test is appreciated. There is increased pain with a lateralMcMurray's test but no palpable click. There is no calf tenderness. There is a negative hip log-roll and Stinchfield test.  Motor, sensory, vascular examination to the Left lower extremity is intact without focal deficits. Neuro: alert and oriented to person and place. Eyes: Extra-ocular muscles intact  Mouth: Oral mucosa moist. No perioral lesions  Pulm: Respirations unlabored and regular. Symmetric chest excursion without outward deformity is noted. Skin: warm, well perfused  Psych:   Patient has good fund of knowledge and displays understanging of exam, diagnosis, and plan. Radiology:     No results found. Assessment:      1. Arthritis of left knee       Plan:      Discussed etiology and natural history of left knee arthritis. The treatment options may include oral anti-inflammatories, bracing, injections, advanced imaging, activity modification, physical therapy and/or surgical intervention. Due to the symptoms the patient is experiencing I feel an MRI would benefit the patient going forward. The patient would like to proceed with MRI on the left knee. The patient will follow up in the office after the MRI. We discussed that the patient should call us with any concerns or questions. Follow up:Return after MRI.     No orders of the defined types were placed in this encounter. Orders Placed This Encounter   Procedures    MRI KNEE LEFT WO CONTRAST     Standing Status:   Future     Standing Expiration Date:   6/7/2022     Order Specific Question:   Reason for exam:     Answer:   rule out tears     I, Earl Irene LPN am scribing for and in the presence of Dr. Keegan De León  6/8/2021 8:38 AM      I have reviewed and made changes accordingly to the work scribed by Earl Irene LPN. The documentation accurately reflects work and decisions made by me. I have also reviewed documentation completed by clinical staff.     Keegan De León DO, [facility]  6/8/2021 8:38 AM    This note is created with the assistance of a speech recognition program.  While intending to generate a document that actually reflects the content of the visit, the document can still have some errors including those of syntax and sound a like substitutions which may escape proof reading.  In such instances, actual meaning can be extrapolated by contextual diversion      Electronically signed by Mami Nieves DO, FAOAO on 6/8/2021 at 8:38 AM

## 2021-06-24 DIAGNOSIS — Z01.818 PREOP TESTING: Primary | ICD-10-CM

## 2021-06-28 ENCOUNTER — TELEPHONE (OUTPATIENT)
Dept: OBGYN | Age: 46
End: 2021-06-28

## 2021-07-01 ENCOUNTER — HOSPITAL ENCOUNTER (OUTPATIENT)
Dept: MRI IMAGING | Age: 46
Discharge: HOME | End: 2021-07-03
Payer: COMMERCIAL

## 2021-07-01 DIAGNOSIS — M17.12 ARTHRITIS OF LEFT KNEE: ICD-10-CM

## 2021-07-01 PROCEDURE — 73721 MRI JNT OF LWR EXTRE W/O DYE: CPT

## 2021-07-06 DIAGNOSIS — E11.9 TYPE 2 DIABETES MELLITUS WITHOUT COMPLICATION, WITHOUT LONG-TERM CURRENT USE OF INSULIN (HCC): ICD-10-CM

## 2021-07-06 NOTE — TELEPHONE ENCOUNTER
48 or higher ([facility] [address]     Hidradenitis suppurativa     Sleep apnea     Perirectal abscess     Iron deficiency anemia     Dysmenorrhea     Menorrhagia     Acute cystitis without hematuria     Abnormal uterine bleeding (AUB)     Hysteroscopy, D&C, Myosure, Mirena IUD Insertion 11/18/19

## 2021-07-08 RX ORDER — SODIUM CHLORIDE, SODIUM LACTATE, POTASSIUM CHLORIDE, CALCIUM CHLORIDE 600; 310; 30; 20 MG/100ML; MG/100ML; MG/100ML; MG/100ML
1000 INJECTION, SOLUTION INTRAVENOUS CONTINUOUS
Status: CANCELLED | OUTPATIENT
Start: 2021-07-08

## 2021-07-12 ENCOUNTER — HOSPITAL ENCOUNTER (OUTPATIENT)
Dept: PREADMISSION TESTING | Age: 46
Discharge: HOME | End: 2021-07-16
Payer: COMMERCIAL

## 2021-07-12 VITALS
DIASTOLIC BLOOD PRESSURE: 78 MMHG | SYSTOLIC BLOOD PRESSURE: 109 MMHG | WEIGHT: 293 LBS | TEMPERATURE: 97.2 F | BODY MASS INDEX: 57.52 KG/M2 | RESPIRATION RATE: 18 BRPM | HEART RATE: 65 BPM | OXYGEN SATURATION: 98 % | HEIGHT: 60 IN

## 2021-07-12 DIAGNOSIS — Z01.818 PREOP TESTING: ICD-10-CM

## 2021-07-12 LAB
ABSOLUTE EOS #: 0.22 K/UL (ref 0–0.44)
ABSOLUTE IMMATURE GRANULOCYTE: 0.03 K/UL (ref 0–0.3)
ABSOLUTE LYMPH #: 2.17 K/UL (ref 1.1–3.7)
ABSOLUTE MONO #: 0.49 K/UL (ref 0.1–1.2)
ANION GAP SERPL CALCULATED.3IONS-SCNC: 11 MMOL/L (ref 9–17)
BASOPHILS # BLD: 1 % (ref 0–2)
BASOPHILS ABSOLUTE: 0.05 K/UL (ref 0–0.2)
BUN BLDV-MCNC: 10 MG/DL (ref 6–20)
BUN/CREAT BLD: ABNORMAL (ref 9–20)
CALCIUM SERPL-MCNC: 8.9 MG/DL (ref 8.6–10.4)
CHLORIDE BLD-SCNC: 104 MMOL/L (ref 98–107)
CO2: 25 MMOL/L (ref 20–31)
CREAT SERPL-MCNC: 0.38 MG/DL (ref 0.5–0.9)
DIFFERENTIAL TYPE: ABNORMAL
EOSINOPHILS RELATIVE PERCENT: 3 % (ref 1–4)
GFR AFRICAN AMERICAN: >60 ML/MIN
GFR NON-AFRICAN AMERICAN: >60 ML/MIN
GFR SERPL CREATININE-BSD FRML MDRD: ABNORMAL ML/MIN/{1.73_M2}
GFR SERPL CREATININE-BSD FRML MDRD: ABNORMAL ML/MIN/{1.73_M2}
GLUCOSE BLD-MCNC: 107 MG/DL (ref 70–99)
HCT VFR BLD CALC: 40.1 % (ref 36.3–47.1)
HEMOGLOBIN: 12.6 G/DL (ref 11.9–15.1)
IMMATURE GRANULOCYTES: 0 %
LYMPHOCYTES # BLD: 27 % (ref 24–43)
MCH RBC QN AUTO: 28.9 PG (ref 25.2–33.5)
MCHC RBC AUTO-ENTMCNC: 31.4 G/DL (ref 28.4–34.8)
MCV RBC AUTO: 92 FL (ref 82.6–102.9)
MONOCYTES # BLD: 6 % (ref 3–12)
NRBC AUTOMATED: 0 PER 100 WBC
PDW BLD-RTO: 15 % (ref 11.8–14.4)
PLATELET # BLD: 273 K/UL (ref 138–453)
PLATELET ESTIMATE: ABNORMAL
PMV BLD AUTO: 11.7 FL (ref 8.1–13.5)
POTASSIUM SERPL-SCNC: 3.9 MMOL/L (ref 3.7–5.3)
RBC # BLD: 4.36 M/UL (ref 3.95–5.11)
RBC # BLD: ABNORMAL 10*6/UL
SEG NEUTROPHILS: 63 % (ref 36–65)
SEGMENTED NEUTROPHILS ABSOLUTE COUNT: 5.09 K/UL (ref 1.5–8.1)
SODIUM BLD-SCNC: 140 MMOL/L (ref 135–144)
WBC # BLD: 8.1 K/UL (ref 3.5–11.3)
WBC # BLD: ABNORMAL 10*3/UL

## 2021-07-12 PROCEDURE — 36415 COLL VENOUS BLD VENIPUNCTURE: CPT

## 2021-07-12 PROCEDURE — 85025 COMPLETE CBC W/AUTO DIFF WBC: CPT

## 2021-07-12 PROCEDURE — 80048 BASIC METABOLIC PNL TOTAL CA: CPT

## 2021-07-25 PROBLEM — Z98.890 POST-OPERATIVE STATE: Status: ACTIVE | Noted: 2021-07-25

## 2021-07-25 NOTE — H&P
OB/GYN Pre-Op H&P  [facility]    Patient Name: Refugio Ceasr     Patient : 1975  Room/Bed: [facility] OR [facility]/NONE  Admission Date/Time: 2021  6:18 AM  Primary Care Physician: JAQUELIN De Oliveira CNP  MRN: [id number]    Date: 2021  Time: 7:37 AM    The patient was seen in pre-op holding. She is here for IUD removal, hysteroscopy and Adrianna endometrial ablation. The patient has had periods that last 6 days which require her to use 4-6 pads daily. She admits that her bleeding has been lighter since her IUD placement, however, the improvement was not as much as she anticipated. She has requested removal of her IUD and an alternative treatment for her heavy menstrual periods. The patient's Hgb from 21 was 12.6. She has previously underwent tubal ligation and has completed childbearing. EMB was completed previously on 19 and was negative for atypia or neoplasia. The procedure risks and complications were reviewed. The labs, Consent, and H&P were reviewed and updated. The patient was counseled on the possibility of  the need of a second surgery. The patient voiced understanding and had all of her questions answered. The possibility of incomplete removal of abnormal tissue was discussed.     OBSTETRICAL HISTORY:   OB History    Para Term  AB Living   5 3 3 0 2 4   SAB TAB Ectopic Molar Multiple Live Births   1 1 0 0 1 4      # Outcome Date GA Lbr Mckinley/2nd Weight Sex Delivery Anes PTL Lv   5 Term      Vag-Spont   ARNOLDO   4 SAB            3 Term 18    F Vag-Spont   ARNOLDO   2 TAB            1A Term      Vag-Spont   ARNOLDO   1B Term      Vag-Spont   ARNOLDO       PAST MEDICAL HISTORY:   has a past medical history of Abnormal uterine bleeding (AUB), Anemia, Arthritis, Asthma, Diabetes mellitus ([address] GERD (gastroesophageal reflux disease), Hidradenitis suppurativa, History of blood transfusion, Hyperlipidemia, Hypertension, Leg swelling, Obesity, morbid, BMI 50 or higher ([facility] [address] Sleep apnea, Wears dentures, and Wellness examination. PAST SURGICAL HISTORY:   has a past surgical history that includes Tubal ligation (1997); Abscess Drainage (12/23/2016); pr rectum surgery procedure unlisted (N/A, 07/02/2018); and Dilation and curettage of uterus (N/A, 11/18/2019). ALLERGIES:  Allergies as of 05/28/2021    (No Known Allergies)       MEDICATIONS:  Current Facility-Administered Medications   Medication Dose Route Frequency Provider Last Rate Last Admin    fentaNYL (SUBLIMAZE) injection 25 mcg  25 mcg Intravenous Q5 Min PRN Maddi King MD        lactated ringers infusion   Intravenous Continuous Maddi King MD        sodium chloride flush 0.9 % injection 5-40 mL  5-40 mL Intravenous 2 times per day Dakota Ramos MD        sodium chloride flush 0.9 % injection 5-40 mL  5-40 mL Intravenous PRN Maddi King MD        0.9 % sodium chloride infusion  25 mL Intravenous PRN Maddi King MD        lidocaine PF 1 % injection 1 mL  1 mL Intradermal Once PRN Maddi King MD        midazolam PF (VERSED) injection 1 mg  1 mg Intravenous Q10 Min PRN Maddi King MD        lactated ringers infusion 1,000 mL  1,000 mL Intravenous Continuous Maddi King MD           FAMILY HISTORY:  family history includes Diabetes in her father and paternal grandmother; Glaucoma in her father and mother; High Blood Pressure in her father; Junius Acosta in her maternal grandmother; Kidney stones in her brother; No Known Problems in her maternal grandfather; Other in her brother; Vision Loss in her father and mother. SOCIAL HISTORY:   reports that she has been smoking cigarettes. She has a 15.00 pack-year smoking history. She has never used smokeless tobacco. She reports previous alcohol use. She reports current drug use. Frequency: 1.50 times per week. Drug: Marijuana.     VITALS:  Vitals:    07/26/21 0732   BP: 124/83   Pulse: 78   Resp: 20 Temp: 96.8 °F (36 °C)   TempSrc: Temporal   SpO2: 99%   Weight: (!) 310 lb (140.6 kg)   Height: 5' (1.524 m)                                                                                                                          PHYSICAL EXAM:     Unchanged from Prior H&P  CONSTITUTIONAL:  Alert and oriented, no acute distress  HEAD: normocephalic, atraumatic  EYES: Pupils equal and reactive to light, Extraocular muscles intact, sclera non icteric  ENT: Mucus membranes moist, No otorrhea, no rhinorrhea  NECK:  supple, symmetrical, trachea midline   LUNGS:  Good air movement bilaterally, unlabored respirations, no wheezes or rhonchi  CARDIOVASCULAR: Regular rate and rhythm, no murmurs rubs or gallops  ABDOMEN: soft, non tender, non distended, no rebound or guarding, no hernias, no hepatomegaly, no splenomegly  MUSCULOSKELETAL:  Equal strength bilaterally, normal muscle tone  SKIN: No abscess or rash  NEUROLOGIC:  Cranial nerves 2-12 grossly intact, no focal deficits  PSYCH: affect appropriate  Pelvic Exam: deferred to OR      LAB RESULTS:  Hospital Outpatient Visit on 07/12/2021   Component Date Value Ref Range Status    WBC 07/12/2021 8.1  3.5 - 11.3 k/uL Final    RBC 07/12/2021 4.36  3.95 - 5.11 m/uL Final    Hemoglobin 07/12/2021 12.6  11.9 - 15.1 g/dL Final    Hematocrit 07/12/2021 40.1  36.3 - 47.1 % Final    MCV 07/12/2021 92.0  82.6 - 102.9 fL Final    MCH 07/12/2021 28.9  25.2 - 33.5 pg Final    MCHC 07/12/2021 31.4  28.4 - 34.8 g/dL Final    RDW 07/12/2021 15.0* 11.8 - 14.4 % Final    Platelets 69/05/8951 273  138 - 453 k/uL Final    MPV 07/12/2021 11.7  8.1 - 13.5 fL Final    NRBC Automated 07/12/2021 0.0  0.0 per 100 WBC Final    Differential Type 07/12/2021 NOT REPORTED   Final    Seg Neutrophils 07/12/2021 63  36 - 65 % Final    Lymphocytes 07/12/2021 27  24 - 43 % Final    Monocytes 07/12/2021 6  3 - 12 % Final    Eosinophils % 07/12/2021 3  1 - 4 % Final    Basophils 07/12/2021 1  0 - 2 % Final    Immature Granulocytes 07/12/2021 0  0 % Final    Segs Absolute 07/12/2021 5.09  1.50 - 8.10 k/uL Final    Absolute Lymph # 07/12/2021 2.17  1.10 - 3.70 k/uL Final    Absolute Mono # 07/12/2021 0.49  0.10 - 1.20 k/uL Final    Absolute Eos # 07/12/2021 0.22  0.00 - 0.44 k/uL Final    Basophils Absolute 07/12/2021 0.05  0.00 - 0.20 k/uL Final    Absolute Immature Granulocyte 07/12/2021 0.03  0.00 - 0.30 k/uL Final    WBC Morphology 07/12/2021 NOT REPORTED   Final    RBC Morphology 07/12/2021 ANISOCYTOSIS PRESENT   Final    Platelet Estimate 06/35/4244 NOT REPORTED   Final    Glucose 07/12/2021 107* 70 - 99 mg/dL Final    BUN 07/12/2021 10  6 - 20 mg/dL Final    CREATININE 07/12/2021 0.38* 0.50 - 0.90 mg/dL Final    Bun/Cre Ratio 07/12/2021 NOT REPORTED  9 - 20 Final    Calcium 07/12/2021 8.9  8.6 - 10.4 mg/dL Final    Sodium 07/12/2021 140  135 - 144 mmol/L Final    Potassium 07/12/2021 3.9  3.7 - 5.3 mmol/L Final    Chloride 07/12/2021 104  98 - 107 mmol/L Final    CO2 07/12/2021 25  20 - 31 mmol/L Final    Anion Gap 07/12/2021 11  9 - 17 mmol/L Final    GFR Non- 07/12/2021 >60  >60 mL/min Final    GFR  07/12/2021 >60  >60 mL/min Final    GFR Comment 07/12/2021        Final    Comment: Average GFR for 38-51 years old:   80 mL/min/1.73sq m  Chronic Kidney Disease:   <60 mL/min/1.73sq m  Kidney failure:   <15 mL/min/1.73sq m              eGFR calculated using average adult body mass.  Additional eGFR calculator available at:        [email or web address]            GFR Staging 07/12/2021 NOT REPORTED   Final       DIAGNOSTICS:    ENDOMETRIUM, CURETTINGS 11/20/19        - Benign shedding endometrium        - Negative for atypia and neoplasia    PELVIC ULTRASOUND 8/16/19  Narrative   EXAMINATION:   PELVIC ULTRASOUND       8/16/2019       TECHNIQUE:   Transabdominal and transvaginal pelvic ultrasound was performed.     COMPARISON:   CT abdomen and pelvis July 2, 2018.       HISTORY:   ORDERING SYSTEM PROVIDED HISTORY: Abnormal uterine bleeding (AUB)   TECHNOLOGIST PROVIDED HISTORY:   AUB       FINDINGS:       Measurements:       Uterus:  10.0 x 5.0 x 6.0 cm       Endometrial stripe:  2.9 mm       Right Ovary:  Not measured       Left Ovary:  1.6 x 1.7 x 2.5 cm           Ultrasound Findings:       Uterus: Uterus demonstrates normal myometrial echotexture.       Endometrial stripe: Endometrial stripe is within normal limits.       Right Ovary: Not identified.  No adnexal mass.       Left Ovary:  Left ovary is within normal limits.       Free Fluid: No evidence of free fluid.           Impression   Unremarkable pelvic ultrasound. PAP WITH HPV 4/11/17   - Negative for intraepithelial lesion or malignancy   - HPV 16, 18 and other high risk HPV not detected     DIAGNOSIS & PLAN:  1. Abnormal Uterine Bleeding   - Proceed with planned procedure: IUD removal, hysteroscopy with Adrianna endometrial ablation   - Consent signed, on chart. - The patient is ready for transport to the operative suite. Counseling: The patient was counseled on all options both medical and surgical, conservative as well as definitive. She has elected to proceed with the procedure as stated above. The patient was counseled on the procedure. Risks and complications were reviewed in detail. The patients orders, labs, consents have been completed. The history and physical as well as all supporting surgical documentation will be forwarded to the pre-operative holding area. The patient is aware that this procedure may not alleviate her symptoms. That there may be a necessity for a second surgery and that there may be an incomplete removal of abnormal tissue.     Mamie Londono MD  Ob/Gyn Resident  Pager: [phone]  [address] [place]  7/26/2021, 7:37 AM         Senior Residents Attestation Statement  I was present with the resident physician during the history and exam. I discussed the findings and plans with the resident physician and agree as documented in her note.      Tan Dorantes DO, PGY-3  Ob/Gyn Resident  Pager: [phone]  [facility] [place]  7/26/2021 7:43 AM

## 2021-07-26 ENCOUNTER — HOSPITAL ENCOUNTER (OUTPATIENT)
Age: 46
Setting detail: OUTPATIENT SURGERY
Discharge: HOME | End: 2021-07-26
Attending: OBSTETRICS & GYNECOLOGY | Admitting: OBSTETRICS & GYNECOLOGY
Payer: COMMERCIAL

## 2021-07-26 ENCOUNTER — ANESTHESIA (OUTPATIENT)
Dept: OPERATING ROOM | Age: 46
End: 2021-07-26
Payer: COMMERCIAL

## 2021-07-26 ENCOUNTER — ANESTHESIA EVENT (OUTPATIENT)
Dept: OPERATING ROOM | Age: 46
End: 2021-07-26
Payer: COMMERCIAL

## 2021-07-26 VITALS
BODY MASS INDEX: 57.52 KG/M2 | HEIGHT: 60 IN | SYSTOLIC BLOOD PRESSURE: 133 MMHG | DIASTOLIC BLOOD PRESSURE: 85 MMHG | TEMPERATURE: 96.9 F | RESPIRATION RATE: 19 BRPM | OXYGEN SATURATION: 94 % | WEIGHT: 293 LBS | HEART RATE: 77 BPM

## 2021-07-26 VITALS
SYSTOLIC BLOOD PRESSURE: 105 MMHG | RESPIRATION RATE: 11 BRPM | DIASTOLIC BLOOD PRESSURE: 72 MMHG | TEMPERATURE: 95.4 F | OXYGEN SATURATION: 100 %

## 2021-07-26 DIAGNOSIS — Z98.890 POST-OPERATIVE STATE: Primary | ICD-10-CM

## 2021-07-26 LAB
GLUCOSE BLD-MCNC: 118 MG/DL (ref 65–105)
GLUCOSE BLD-MCNC: 132 MG/DL (ref 65–105)

## 2021-07-26 PROCEDURE — 3700000001 HC ADD 15 MINUTES (ANESTHESIA): Performed by: OBSTETRICS & GYNECOLOGY

## 2021-07-26 PROCEDURE — 82947 ASSAY GLUCOSE BLOOD QUANT: CPT

## 2021-07-26 PROCEDURE — 2580000003 HC RX 258: Performed by: OBSTETRICS & GYNECOLOGY

## 2021-07-26 PROCEDURE — 2580000003 HC RX 258: Performed by: ANESTHESIOLOGY

## 2021-07-26 PROCEDURE — 7100000000 HC PACU RECOVERY - FIRST 15 MIN: Performed by: OBSTETRICS & GYNECOLOGY

## 2021-07-26 PROCEDURE — 2500000003 HC RX 250 WO HCPCS: Performed by: NURSE ANESTHETIST, CERTIFIED REGISTERED

## 2021-07-26 PROCEDURE — 3600000014 HC SURGERY LEVEL 4 ADDTL 15MIN: Performed by: OBSTETRICS & GYNECOLOGY

## 2021-07-26 PROCEDURE — 3600000004 HC SURGERY LEVEL 4 BASE: Performed by: OBSTETRICS & GYNECOLOGY

## 2021-07-26 PROCEDURE — 6360000002 HC RX W HCPCS: Performed by: ANESTHESIOLOGY

## 2021-07-26 PROCEDURE — 7100000011 HC PHASE II RECOVERY - ADDTL 15 MIN: Performed by: OBSTETRICS & GYNECOLOGY

## 2021-07-26 PROCEDURE — 6360000002 HC RX W HCPCS: Performed by: NURSE ANESTHETIST, CERTIFIED REGISTERED

## 2021-07-26 PROCEDURE — 58563 HYSTEROSCOPY ABLATION: CPT | Performed by: OBSTETRICS & GYNECOLOGY

## 2021-07-26 PROCEDURE — 2709999900 HC NON-CHARGEABLE SUPPLY: Performed by: OBSTETRICS & GYNECOLOGY

## 2021-07-26 PROCEDURE — 6370000000 HC RX 637 (ALT 250 FOR IP): Performed by: ANESTHESIOLOGY

## 2021-07-26 PROCEDURE — 88300 SURGICAL PATH GROSS: CPT

## 2021-07-26 PROCEDURE — 88305 TISSUE EXAM BY PATHOLOGIST: CPT

## 2021-07-26 PROCEDURE — 3700000000 HC ANESTHESIA ATTENDED CARE: Performed by: OBSTETRICS & GYNECOLOGY

## 2021-07-26 PROCEDURE — 7100000001 HC PACU RECOVERY - ADDTL 15 MIN: Performed by: OBSTETRICS & GYNECOLOGY

## 2021-07-26 PROCEDURE — 7100000010 HC PHASE II RECOVERY - FIRST 15 MIN: Performed by: OBSTETRICS & GYNECOLOGY

## 2021-07-26 RX ORDER — LIDOCAINE HYDROCHLORIDE 10 MG/ML
1 INJECTION, SOLUTION EPIDURAL; INFILTRATION; INTRACAUDAL; PERINEURAL
Status: DISCONTINUED | OUTPATIENT
Start: 2021-07-26 | End: 2021-07-26 | Stop reason: HOSPADM

## 2021-07-26 RX ORDER — DIPHENHYDRAMINE HYDROCHLORIDE 50 MG/ML
INJECTION INTRAMUSCULAR; INTRAVENOUS PRN
Status: DISCONTINUED | OUTPATIENT
Start: 2021-07-26 | End: 2021-07-26 | Stop reason: SDUPTHER

## 2021-07-26 RX ORDER — SODIUM CHLORIDE 9 MG/ML
25 INJECTION, SOLUTION INTRAVENOUS PRN
Status: DISCONTINUED | OUTPATIENT
Start: 2021-07-26 | End: 2021-07-26 | Stop reason: HOSPADM

## 2021-07-26 RX ORDER — MAGNESIUM HYDROXIDE 1200 MG/15ML
LIQUID ORAL CONTINUOUS PRN
Status: COMPLETED | OUTPATIENT
Start: 2021-07-26 | End: 2021-07-26

## 2021-07-26 RX ORDER — SODIUM CHLORIDE, SODIUM LACTATE, POTASSIUM CHLORIDE, CALCIUM CHLORIDE 600; 310; 30; 20 MG/100ML; MG/100ML; MG/100ML; MG/100ML
1000 INJECTION, SOLUTION INTRAVENOUS CONTINUOUS
Status: DISCONTINUED | OUTPATIENT
Start: 2021-07-26 | End: 2021-07-26 | Stop reason: HOSPADM

## 2021-07-26 RX ORDER — HYDROCODONE BITARTRATE AND ACETAMINOPHEN 5; 325 MG/1; MG/1
1 TABLET ORAL
Status: COMPLETED | OUTPATIENT
Start: 2021-07-26 | End: 2021-07-26

## 2021-07-26 RX ORDER — FENTANYL CITRATE 50 UG/ML
25 INJECTION, SOLUTION INTRAMUSCULAR; INTRAVENOUS EVERY 5 MIN PRN
Status: DISCONTINUED | OUTPATIENT
Start: 2021-07-26 | End: 2021-07-26 | Stop reason: HOSPADM

## 2021-07-26 RX ORDER — DEXAMETHASONE SODIUM PHOSPHATE 10 MG/ML
INJECTION INTRAMUSCULAR; INTRAVENOUS PRN
Status: DISCONTINUED | OUTPATIENT
Start: 2021-07-26 | End: 2021-07-26 | Stop reason: SDUPTHER

## 2021-07-26 RX ORDER — MIDAZOLAM HYDROCHLORIDE 2 MG/2ML
1 INJECTION, SOLUTION INTRAMUSCULAR; INTRAVENOUS EVERY 10 MIN PRN
Status: DISCONTINUED | OUTPATIENT
Start: 2021-07-26 | End: 2021-07-26 | Stop reason: HOSPADM

## 2021-07-26 RX ORDER — HYDROCODONE BITATRATE AND ACETAMINOPHEN 5; 325 MG/1; MG/1
1 TABLET ORAL EVERY 6 HOURS PRN
Qty: 5 TABLET | Refills: 0 | Status: SHIPPED | OUTPATIENT
Start: 2021-07-26 | End: 2021-07-29

## 2021-07-26 RX ORDER — LIDOCAINE HYDROCHLORIDE 10 MG/ML
INJECTION, SOLUTION EPIDURAL; INFILTRATION; INTRACAUDAL; PERINEURAL PRN
Status: DISCONTINUED | OUTPATIENT
Start: 2021-07-26 | End: 2021-07-26 | Stop reason: SDUPTHER

## 2021-07-26 RX ORDER — FENTANYL CITRATE 50 UG/ML
INJECTION, SOLUTION INTRAMUSCULAR; INTRAVENOUS PRN
Status: DISCONTINUED | OUTPATIENT
Start: 2021-07-26 | End: 2021-07-26 | Stop reason: SDUPTHER

## 2021-07-26 RX ORDER — PHENYLEPHRINE HYDROCHLORIDE 10 MG/ML
INJECTION INTRAVENOUS PRN
Status: DISCONTINUED | OUTPATIENT
Start: 2021-07-26 | End: 2021-07-26 | Stop reason: SDUPTHER

## 2021-07-26 RX ORDER — SODIUM CHLORIDE 0.9 % (FLUSH) 0.9 %
5-40 SYRINGE (ML) INJECTION PRN
Status: DISCONTINUED | OUTPATIENT
Start: 2021-07-26 | End: 2021-07-26 | Stop reason: HOSPADM

## 2021-07-26 RX ORDER — IBUPROFEN 600 MG/1
600 TABLET ORAL 4 TIMES DAILY PRN
Qty: 360 TABLET | Refills: 1 | Status: SHIPPED | OUTPATIENT
Start: 2021-07-26 | End: 2021-09-07 | Stop reason: ALTCHOICE

## 2021-07-26 RX ORDER — ONDANSETRON 2 MG/ML
INJECTION INTRAMUSCULAR; INTRAVENOUS PRN
Status: DISCONTINUED | OUTPATIENT
Start: 2021-07-26 | End: 2021-07-26 | Stop reason: SDUPTHER

## 2021-07-26 RX ORDER — PROPOFOL 10 MG/ML
INJECTION, EMULSION INTRAVENOUS PRN
Status: DISCONTINUED | OUTPATIENT
Start: 2021-07-26 | End: 2021-07-26 | Stop reason: SDUPTHER

## 2021-07-26 RX ORDER — KETOROLAC TROMETHAMINE 30 MG/ML
INJECTION, SOLUTION INTRAMUSCULAR; INTRAVENOUS PRN
Status: DISCONTINUED | OUTPATIENT
Start: 2021-07-26 | End: 2021-07-26 | Stop reason: SDUPTHER

## 2021-07-26 RX ORDER — MEPERIDINE HYDROCHLORIDE 50 MG/ML
12.5 INJECTION INTRAMUSCULAR; INTRAVENOUS; SUBCUTANEOUS EVERY 5 MIN PRN
Status: DISCONTINUED | OUTPATIENT
Start: 2021-07-26 | End: 2021-07-26 | Stop reason: HOSPADM

## 2021-07-26 RX ORDER — SODIUM CHLORIDE 0.9 % (FLUSH) 0.9 %
5-40 SYRINGE (ML) INJECTION EVERY 12 HOURS SCHEDULED
Status: DISCONTINUED | OUTPATIENT
Start: 2021-07-26 | End: 2021-07-26 | Stop reason: HOSPADM

## 2021-07-26 RX ORDER — ONDANSETRON 4 MG/1
4 TABLET, ORALLY DISINTEGRATING ORAL EVERY 8 HOURS PRN
Qty: 10 TABLET | Refills: 0 | Status: SHIPPED | OUTPATIENT
Start: 2021-07-26 | End: 2021-09-24 | Stop reason: ALTCHOICE

## 2021-07-26 RX ORDER — DOCUSATE SODIUM 100 MG/1
100 CAPSULE, LIQUID FILLED ORAL 2 TIMES DAILY
Qty: 60 CAPSULE | Refills: 1 | Status: SHIPPED | OUTPATIENT
Start: 2021-07-26 | End: 2021-11-09 | Stop reason: SDUPTHER

## 2021-07-26 RX ORDER — SODIUM CHLORIDE, SODIUM LACTATE, POTASSIUM CHLORIDE, CALCIUM CHLORIDE 600; 310; 30; 20 MG/100ML; MG/100ML; MG/100ML; MG/100ML
INJECTION, SOLUTION INTRAVENOUS CONTINUOUS
Status: DISCONTINUED | OUTPATIENT
Start: 2021-07-26 | End: 2021-07-26 | Stop reason: HOSPADM

## 2021-07-26 RX ADMIN — FENTANYL CITRATE 50 MCG: 50 INJECTION, SOLUTION INTRAMUSCULAR; INTRAVENOUS at 08:29

## 2021-07-26 RX ADMIN — FENTANYL CITRATE 25 MCG: 50 INJECTION, SOLUTION INTRAMUSCULAR; INTRAVENOUS at 09:05

## 2021-07-26 RX ADMIN — Medication 12.5 MG: at 09:04

## 2021-07-26 RX ADMIN — LIDOCAINE HYDROCHLORIDE 50 MG: 10 INJECTION, SOLUTION EPIDURAL; INFILTRATION; INTRACAUDAL; PERINEURAL at 08:29

## 2021-07-26 RX ADMIN — HYDROCODONE BITARTRATE AND ACETAMINOPHEN 1 TABLET: 5; 325 TABLET ORAL at 10:49

## 2021-07-26 RX ADMIN — ONDANSETRON 4 MG: 2 INJECTION, SOLUTION INTRAMUSCULAR; INTRAVENOUS at 09:16

## 2021-07-26 RX ADMIN — PHENYLEPHRINE HYDROCHLORIDE 100 MCG: 10 INJECTION INTRAVENOUS at 08:52

## 2021-07-26 RX ADMIN — PROPOFOL INJECTABLE EMULSION 200 MG: 10 INJECTION, EMULSION INTRAVENOUS at 08:29

## 2021-07-26 RX ADMIN — FENTANYL CITRATE 25 MCG: 50 INJECTION, SOLUTION INTRAMUSCULAR; INTRAVENOUS at 08:57

## 2021-07-26 RX ADMIN — FENTANYL CITRATE 25 MCG: 50 INJECTION, SOLUTION INTRAMUSCULAR; INTRAVENOUS at 10:00

## 2021-07-26 RX ADMIN — KETOROLAC TROMETHAMINE 30 MG: 30 INJECTION, SOLUTION INTRAMUSCULAR at 09:24

## 2021-07-26 RX ADMIN — DEXAMETHASONE SODIUM PHOSPHATE 10 MG: 10 INJECTION INTRAMUSCULAR; INTRAVENOUS at 08:39

## 2021-07-26 RX ADMIN — SODIUM CHLORIDE, POTASSIUM CHLORIDE, SODIUM LACTATE AND CALCIUM CHLORIDE 1000 ML: 600; 310; 30; 20 INJECTION, SOLUTION INTRAVENOUS at 07:49

## 2021-07-26 RX ADMIN — FENTANYL CITRATE 25 MCG: 50 INJECTION, SOLUTION INTRAMUSCULAR; INTRAVENOUS at 10:17

## 2021-07-26 RX ADMIN — FENTANYL CITRATE 25 MCG: 50 INJECTION, SOLUTION INTRAMUSCULAR; INTRAVENOUS at 09:48

## 2021-07-26 ASSESSMENT — PULMONARY FUNCTION TESTS
PIF_VALUE: 18
PIF_VALUE: 17
PIF_VALUE: 18
PIF_VALUE: 18
PIF_VALUE: 19
PIF_VALUE: 19
PIF_VALUE: 18
PIF_VALUE: 3
PIF_VALUE: 17
PIF_VALUE: 0
PIF_VALUE: 0
PIF_VALUE: 19
PIF_VALUE: 19
PIF_VALUE: 4
PIF_VALUE: 18
PIF_VALUE: 17
PIF_VALUE: 18
PIF_VALUE: 17
PIF_VALUE: 18
PIF_VALUE: 17
PIF_VALUE: 3
PIF_VALUE: 18
PIF_VALUE: 18
PIF_VALUE: 19
PIF_VALUE: 16
PIF_VALUE: 18
PIF_VALUE: 17
PIF_VALUE: 19
PIF_VALUE: 17
PIF_VALUE: 18
PIF_VALUE: 19
PIF_VALUE: 17
PIF_VALUE: 17
PIF_VALUE: 19
PIF_VALUE: 18
PIF_VALUE: 19
PIF_VALUE: 17
PIF_VALUE: 0
PIF_VALUE: 18
PIF_VALUE: 18
PIF_VALUE: 17
PIF_VALUE: 19
PIF_VALUE: 17
PIF_VALUE: 19
PIF_VALUE: 18
PIF_VALUE: 17
PIF_VALUE: 19
PIF_VALUE: 17
PIF_VALUE: 18
PIF_VALUE: 17
PIF_VALUE: 17
PIF_VALUE: 18
PIF_VALUE: 19
PIF_VALUE: 17
PIF_VALUE: 0
PIF_VALUE: 18
PIF_VALUE: 18
PIF_VALUE: 17

## 2021-07-26 ASSESSMENT — PAIN - FUNCTIONAL ASSESSMENT: PAIN_FUNCTIONAL_ASSESSMENT: 0-10

## 2021-07-26 ASSESSMENT — PAIN SCALES - GENERAL
PAINLEVEL_OUTOF10: 7
PAINLEVEL_OUTOF10: 8
PAINLEVEL_OUTOF10: 10
PAINLEVEL_OUTOF10: 10
PAINLEVEL_OUTOF10: 9

## 2021-07-26 ASSESSMENT — PAIN DESCRIPTION - ORIENTATION: ORIENTATION: LOWER

## 2021-07-26 ASSESSMENT — PAIN DESCRIPTION - PAIN TYPE: TYPE: ACUTE PAIN;SURGICAL PAIN

## 2021-07-26 ASSESSMENT — PAIN DESCRIPTION - LOCATION: LOCATION: ABDOMEN

## 2021-07-26 NOTE — PROGRESS NOTES
The patient was seen in the preoperative holding area. There have been no changes in her condition since completion of the preoperative H&P. I have personally explained the planned procedure to her. The indication, risks and alternatives were reviewed. All of her questions were answered to the best of my ability. Ok to proceed to the OR.

## 2021-07-26 NOTE — OP NOTE
Operative Note  Department of Obstetrics and Gynecology  [facility]       Patient: Keshawn Perea   : 1975  MRN: [id number]       Acct: [de-identified]   PCP: JAQUELIN Millan CNP  Date of Procedure: 21    Pre-operative Diagnosis: 39 y.o. female [id number]               AUB      Post-operative Diagnosis: same, Mirena IUD removed intact     Procedure:  IUD removal, D&C, hysteroscopy and Adrianna endometrial ablation.     Surgeon: Dr. Emely Alvarado      Assistant(s): Raheem Patient, DO, PGY3; Emil Gilliam MD, PGY1;      Anesthesia: general via LMA    Indications: The patient is a 39y.o.-year-old [id number] with AUB. She is here for IUD removal, hysteroscopy and Adrianna endometrial ablation. The patient has had periods that last 6 days which require her to use 4-6 pads daily. She admits that her bleeding has been lighter since her IUD placement, however, the improvement was not as much as she anticipated. She has requested removal of her IUD and an alternative treatment for her heavy menstrual periods. The patient's Hgb from 21 was 12.6. She has previously underwent tubal ligation and has completed childbearing. EMB was completed previously on 19 and was negative for atypia or neoplasia. Procedure Details: The patient was seen in the pre-op room. The risks, benefits, complications, treatment options, and expected outcomes were discussed with the patient. The patient concurred with the proposed plan, giving informed consent. The patient was taken to the Operating Room and identified as Keshawn Perea and the procedure was verified. A Time Out was held and the above information confirmed. After administration of adequate general anesthesia. She was placed in the dorsolithotomy position with yellofin stirrups and prepped and draped in the usual sterile fashion. The bladder was emptied.  Examination under anesthesia revealed anteverted, mobile uterus without any masses noted. Bilateral adnexa difficult to assess due to patient body habitus. A weighted speculum was inserted into the posterior vaginal fornix. The anterior cervical lip was grasped with a single-tooth tenaculum. Bilateral IUD strings visualized protruding from the external cervical OS and were grasped with a ringed forceps. IUD was removed without any difficultly and appeared intact and sent to pathology for gross examination. The uterine sound was used to measure the cervix and uterine cavity, measuring 4.5cm and 10.5cm respectively. The cervix was then dilated with hegars dilators to size 6mm. Symphion hysteroscopy was carried out revealing a normal appearing endocervical canal and lush uniform endometrium without fibroids or polyps. A sharp curettage was then carried out with endometrial curettings sent to pathology. The endocervical canal was further dilated to 8mm with hegar dilators and Adrianna dilator passed easily and without resistance into the cervical os. Adrianna device was advanced to the uterine fundus after setting to cavity length of 6 cm. The cavity assessment was successfully completed followed by initiation of the ablation cycle which was completed without difficulty in 120 seconds per manufacture's guidelines. The Adrianna device was removed and bina was visualized on the device. All the vaginal instruments were removed and hemostasis at the site of the tenaculum was noted. Instrument, sponge, and needle counts were correct at the conclusion of the case. SCDs for DVT prophylaxis remain in place for the post operative period. Dr. She George was present for the entire operation. Findings:  Normal appearing external genitalia without any lesions. Normal  Appearing vaginal mucosa with redundant vaginal tissue. Normal  Appearing cervix without lesions. IUD strings visualized in the external OS. Uterine cavity showed lush uniform endometrium without fibroids or polyps.  Fallopian tube ostia both visualized. No gross abnormalities noted in the uterine cavity. Total IV fluids/Blood products:  700 ml crystalloid  Urine Output:  50 ml    Estimated blood loss:  10  Drains:  none  Specimens:  Mirena IUD, endometrial curettings   Instrument and Sponge Count: Correct  Complications:  none  Condition:  good, transferred to post anesthesia recovery    Lambert Buchanan MD  Ob/Gyn Resident  7/26/2021, 9:59 AM       Senior Residents Attestation Statement  I was present with the resident physician during the history and exam. I discussed the findings and plans with the resident physician and agree as documented in her note.      Humberto Espinal DO, PGY-3  Ob/Gyn Resident  Pager: [phone] [address]  7/26/2021 10:01 AM

## 2021-07-26 NOTE — ANESTHESIA PRE PROCEDURE
Department of Anesthesiology  Preprocedure Note       Name:  Juan Carlos Srivastava   Age:  39 y.o.  :  1975                                          MRN:  [id number]         Date:  2021      Surgeon: Brianna Sainz):  Kelechi Castro MD    Procedure: Procedure(s):  IUD REMOVAL, HYSTEROSCOPY,  MICHELE ENDOMETRIAL ABLATION REP CONFIRMED BY [facility]    Medications prior to admission:   Prior to Admission medications    Medication Sig Start Date End Date Taking? Authorizing Provider   amLODIPine (NORVASC) 10 MG tablet Take 1 tablet by mouth daily 21   Francoise Castaneda APRN - CNP   Alcohol Swabs (B-D SINGLE USE SWABS REGULAR) PADS USE TO CLEANSE AREA AS NEEDED 21   Francoise Castaneda APRN - CNP   tiZANidine (ZANAFLEX) 4 MG tablet Take 1 tablet by mouth every 8 hours as needed (back pain) 21   Francoise Castaneda APRN - CNP   doxycycline hyclate (VIBRAMYCIN) 100 MG capsule take 1 capsule by mouth twice a day with food 21   Francoise Castaneda APRN - CNP   metFORMIN (GLUCOPHAGE) 500 MG tablet Take 1 tablet by mouth daily (with breakfast) 3/1/21   Francoise Castaneda APRN - CNP   Blood Pressure KIT Use to monitor blood pressure daily and as needed 3/1/21   Francoise Castaneda APRN - CNP   benzoyl peroxide 5 % external liquid Wash face, chest and back 1-2 times daily 20   Francoise Castaneda APRN - CNP   clindamycin (CLEOCIN T) 1 % lotion Apply to affected areas daily 20   JAQUELIN Calderon CNP       Current medications:    No current facility-administered medications for this encounter.        Allergies:  No Known Allergies    Problem List:    Patient Active Problem List   Diagnosis Code    Hyperlipidemia E78.5    Hypertension I10    Diabetes mellitus ([facility] [address] E11.9    GERD (gastroesophageal reflux disease) K21.9    Obesity, morbid, BMI 50 or higher ([facility]) E66.01    Hidradenitis suppurativa L73.2    Sleep apnea G47.30    Perirectal abscess K61.1    Iron deficiency anemia D50.9    Dysmenorrhea N94.6    Menorrhagia N92.0    Acute cystitis without hematuria N30.00    Abnormal uterine bleeding (AUB) N93.9    Hysteroscopy, D&C, Myosure, Mirena IUD Insertion 11/18/19 Z98.890    S/p IUD removal, hysteroscopy with Adrianna endometrial ablation 7/26/21 ** Z98.890       Past Medical History:        Diagnosis Date    Abnormal uterine bleeding (AUB)     Anemia     Arthritis     KNEE AND BACK    Asthma     Diabetes mellitus ([address]     GERD (gastroesophageal reflux disease)     Hidradenitis suppurativa     History of blood transfusion 1997    Hyperlipidemia     Hypertension     Leg swelling     Obesity, morbid, BMI 50 or higher ([address]     Sleep apnea     needs new machine    Wears dentures     never wears them    Wellness examination 07/12/2021    [facility]-last visit june 2021       Past Surgical History:        Procedure Laterality Date    ABSCESS DRAINAGE  12/23/2016    BUTTOCK    DILATION AND CURETTAGE OF UTERUS N/A 11/18/2019    DILATATION AND CURETTAGE HYSTEROSCOPY, Samule Spice, MIRENA IUD INSERTION performed by Brian Mcneal MD at [address] [place] [address] N/A 07/02/2018    RECTAL PERIRECTAL INCISION AND DRAINAGE performed by Justin Wall DO at [address] [place] [address]       Social History:    Social History     Tobacco Use    Smoking status: Current Every Day Smoker     Packs/day: 0.50     Years: 30.00     Pack years: 15.00     Types: Cigarettes    Smokeless tobacco: Never Used   Substance Use Topics    Alcohol use: Not Currently     Comment: Occasionally                                Ready to quit: Not Answered  Counseling given: Not Answered      Vital Signs (Current): There were no vitals filed for this visit.                                            BP Readings from Last 3 Encounters:   07/12/21 109/78   05/11/21 (!) 154/103   04/13/21 136/89       NPO Status: Time of last liquid consumption: 2200                        Time of last solid consumption: 2100                                                      BMI:   Wt Readings from Last 3 Encounters:   07/12/21 (!) 310 lb (140.6 kg)   06/07/21 (!) 310 lb 6.4 oz (140.8 kg)   05/11/21 (!) 312 lb (141.5 kg)     There is no height or weight on file to calculate BMI.    CBC:   Lab Results   Component Value Date    WBC 8.1 07/12/2021    RBC 4.36 07/12/2021    HGB 12.6 07/12/2021    HCT 40.1 07/12/2021    MCV 92.0 07/12/2021    RDW 15.0 07/12/2021     07/12/2021       CMP:   Lab Results   Component Value Date     07/12/2021    K 3.9 07/12/2021     07/12/2021    CO2 25 07/12/2021    BUN 10 07/12/2021    CREATININE 0.38 07/12/2021    GFRAA >60 07/12/2021    LABGLOM >60 07/12/2021    GLUCOSE 107 07/12/2021    PROT 7.2 05/11/2018    CALCIUM 8.9 07/12/2021    BILITOT 0.19 05/11/2018    ALKPHOS 54 05/11/2021    AST 11 05/11/2018    ALT 12 05/11/2021       POC Tests: No results for input(s): POCGLU, POCNA, POCK, POCCL, POCBUN, POCHEMO, POCHCT in the last 72 hours.     Coags:   Lab Results   Component Value Date    PROTIME 11.4 08/11/2013    INR 1.1 08/11/2013    APTT 26.1 08/11/2013       HCG (If Applicable):   Lab Results   Component Value Date    PREGTESTUR negative 07/02/2018    HCG NEGATIVE 11/18/2019    HCGQUANT <1 08/16/2019        ABGs: No results found for: PHART, PO2ART, XLJ4FJX, ZQC1SPK, BEART, I2FIJKZI     Type & Screen (If Applicable):  No results found for: LABABO, LABRH    Drug/Infectious Status (If Applicable):  Lab Results   Component Value Date    HEPCAB NONREACTIVE 08/16/2019       COVID-19 Screening (If Applicable):   Lab Results   Component Value Date    COVID19 DETECTED 05/20/2021           Anesthesia Evaluation  Patient summary reviewed no history of anesthetic complications:   Airway: Mallampati: II  TM distance: >3 FB   Neck ROM: full  Mouth opening: > = 3 FB Dental:    (+) edentulous      Pulmonary:normal exam    (+) sleep apnea: on CPAP,  asthma: seasonal asthma, Cardiovascular:    (+) hypertension: no interval change,         Rhythm: regular  Rate: normal                    Neuro/Psych:   Negative Neuro/Psych ROS              GI/Hepatic/Renal:   (+) GERD: no interval change, morbid obesity          Endo/Other:    (+) DiabetesType II DM, no interval change, , .                 Abdominal:   (+) obese,           Vascular: negative vascular ROS. Other Findings:           Anesthesia Plan      general     ASA 3       Induction: intravenous. Anesthetic plan and risks discussed with patient. Plan discussed with CRNA.                   Lyssa Palencia MD   7/26/2021

## 2021-07-26 NOTE — BRIEF OP NOTE
Brief Operative Note  Department of Obstetrics and Gynecology  [facility]     Patient: Izaiah Amador   : 1975  MRN: [id number]       Acct: [de-identified]   Date of Procedure: 21     Pre-operative Diagnosis: 39 y.o. female [id number]    AUB     Post-operative Diagnosis: same, Mirena IUD removed intact    Procedure:  IUD removal, D&C, hysteroscopy and Adrianna endometrial ablation. Surgeon: Dr. Jasper Turk      Assistant(s): Zeyad Le DO, PGY3; Leticia Valenzuela MD, PGY1;     Anesthesia: general via LMA    Findings:  Normal appearing external genitalia without any lesions. Normal  Appearing vaginal mucosa with redundant vaginal tissue. Normal  Appearing cervix without lesions. IUD strings visualized in the external OS. Uterine cavity showed lush uniform endometrium without fibroids or polyps. Fallopian tube ostia both visualized. No gross abnormalities noted in the uterine cavity. Total IV fluids/Blood products:  700 ml crystalloid  Urine Output:  50 ml    Estimated blood loss:  10  Drains:  none  Specimens:  Mirena IUD, endometrial curettings   Instrument and Sponge Count: Correct  Complications:  none  Condition:  good, transferred to post anesthesia recovery    See full operative report for further details.     Leticia Valenzuela MD  Ob/Gyn Resident  Pager: [phone]  2021, 9:35 AM

## 2021-07-26 NOTE — ANESTHESIA POSTPROCEDURE EVALUATION
Department of Anesthesiology  Postprocedure Note    Patient: Chelita Ortiz  MRN: [id number]  [place]: 1975  Date of evaluation: 7/26/2021  Time:  10:00 AM     Procedure Summary     Date: 07/26/21 Room / Location: [address]    Anesthesia Start: 6757 Anesthesia Stop: 8541    Procedures:       IUD REMOVAL, HYSTEROSCOPY,  MICHELE ENDOMETRIAL ABLATION REP CONFIRMED BY [facility] (N/A )      DILATATION AND CURETTAGE (N/A ) Diagnosis: (ABNORMAL UTERINE BLEEDING)    Surgeons: Aline Quijano MD Responsible Provider: James Wu MD    Anesthesia Type: general ASA Status: 3          Anesthesia Type: general    Deejay Phase I: Deejay Score: 8    Deejay Phase II:      Last vitals: Reviewed and per EMR flowsheets.        Anesthesia Post Evaluation    Patient location during evaluation: PACU  Patient participation: complete - patient participated  Level of consciousness: awake and alert  Pain score: 2  Airway patency: patent  Nausea & Vomiting: no vomiting and no nausea  Complications: no  Cardiovascular status: hemodynamically stable  Respiratory status: acceptable  Hydration status: stable

## 2021-07-27 DIAGNOSIS — G89.18 POSTOPERATIVE PAIN: Primary | ICD-10-CM

## 2021-07-27 LAB — SURGICAL PATHOLOGY REPORT: NORMAL

## 2021-07-27 RX ORDER — ACETAMINOPHEN 325 MG/1
650 TABLET ORAL EVERY 6 HOURS PRN
Qty: 60 TABLET | Refills: 3 | Status: SHIPPED | OUTPATIENT
Start: 2021-07-27 | End: 2021-09-24 | Stop reason: SDUPTHER

## 2021-07-30 DIAGNOSIS — E11.9 TYPE 2 DIABETES MELLITUS WITHOUT COMPLICATION, WITHOUT LONG-TERM CURRENT USE OF INSULIN (HCC): ICD-10-CM

## 2021-07-30 NOTE — TELEPHONE ENCOUNTER
Metformin refill    Pt last seen 06/01/2021     Pt has future with dr physician     Next Visit Date:  Future Appointments   Date Time Provider Red Brianne   8/10/2021  9:15 AM Rupesh Hayward MD [address] [place] [address] OB/Gyn [address] Maintenance   Topic Date Due    Diabetic retinal exam  Never done    COVID-19 Vaccine (1) Never done    Diabetic microalbuminuria test  07/24/2021    Hepatitis B vaccine (1 of 3 - Risk 3-dose series) 09/01/2021 (Originally 11/17/1996)    DTaP/Tdap/Td vaccine (1 - Tdap) 09/01/2021 (Originally 11/17/1996)    Pneumococcal 0-64 years Vaccine (1 of 2 - PPSV23) 09/01/2021 (Originally 11/17/1983)    Flu vaccine (1) 09/01/2021    Diabetic foot exam  03/01/2022    A1C test (Diabetic or Prediabetic)  03/01/2022    Lipid screen  03/01/2022    Cervical cancer screen  04/07/2022    Hepatitis C screen  Completed    HIV screen  Completed    Hepatitis A vaccine  Aged Out    Hib vaccine  Aged Out    Meningococcal (ACWY) vaccine  Aged Out       Hemoglobin A1C (%)   Date Value   03/01/2021 6.4   07/24/2020 6.6 (H)   02/18/2020 6.6 (H)             ( goal A1C is < 7)   Microalb/Crt.  Ratio (mcg/mg creat)   Date Value   07/24/2020 48 (H)     LDL Cholesterol (mg/dL)   Date Value   03/01/2021 81   09/11/2019 81       (goal LDL is <100)   AST (U/L)   Date Value   05/11/2018 11     ALT (U/L)   Date Value   05/11/2021 12     BUN (mg/dL)   Date Value   07/12/2021 10     BP Readings from Last 3 Encounters:   07/26/21 133/85   07/26/21 105/72   07/12/21 109/78          (goal 120/80)    All Future Testing planned in [facility]  Lab Frequency Next Occurrence   ESTEBAN DIGITAL SCREEN W OR WO CAD BILATERAL Once 10/13/2021   COVID-19 Once 09/09/2021               Patient Active Problem List:     Hyperlipidemia     Hypertension     Diabetes mellitus ([address]     GERD (gastroesophageal reflux disease)     Obesity, morbid, BMI 50 or higher (HCC)     Hidradenitis suppurativa     Sleep apnea     Perirectal abscess Iron deficiency anemia     Dysmenorrhea     Menorrhagia     Acute cystitis without hematuria     Abnormal uterine bleeding (AUB)     Hysteroscopy, D&C, Myosure, Mirena IUD Insertion 11/18/19     S/p IUD removal, D&C, hysteroscopy with Adrianna endometrial ablation 7/26/21      Retained intrauterine contraceptive device (IUD)

## 2021-08-10 ENCOUNTER — VIRTUAL VISIT (OUTPATIENT)
Dept: OBGYN | Age: 46
End: 2021-08-10
Payer: COMMERCIAL

## 2021-08-10 ENCOUNTER — TELEPHONE (OUTPATIENT)
Dept: OBGYN | Age: 46
End: 2021-08-10

## 2021-08-10 DIAGNOSIS — Z09 POSTOPERATIVE EXAMINATION: ICD-10-CM

## 2021-08-10 PROCEDURE — G8427 DOCREV CUR MEDS BY ELIG CLIN: HCPCS | Performed by: OBSTETRICS & GYNECOLOGY

## 2021-08-10 PROCEDURE — 4004F PT TOBACCO SCREEN RCVD TLK: CPT | Performed by: OBSTETRICS & GYNECOLOGY

## 2021-08-10 PROCEDURE — G8417 CALC BMI ABV UP PARAM F/U: HCPCS | Performed by: OBSTETRICS & GYNECOLOGY

## 2021-08-10 PROCEDURE — 99214 OFFICE O/P EST MOD 30 MIN: CPT | Performed by: OBSTETRICS & GYNECOLOGY

## 2021-08-10 RX ORDER — GABAPENTIN 300 MG/1
CAPSULE ORAL
Qty: 30 CAPSULE | Refills: 1 | Status: SHIPPED | OUTPATIENT
Start: 2021-08-10 | End: 2021-09-07 | Stop reason: SDUPTHER

## 2021-08-10 NOTE — Clinical Note
Patient requests in person visit with me. Refuses visit with any other provider. She was offered office visit tomorrow. Refuses. Please call the patient and schedule follow up visit with me .

## 2021-08-10 NOTE — TELEPHONE ENCOUNTER
----- Message from Mandi Mcdaniel MD sent at 8/10/2021 10:05 AM EDT -----  Patient requests in person visit with me. Refuses visit with any other provider. She was offered office visit tomorrow. Refuses. Please call the patient and schedule follow up visit with me .

## 2021-08-10 NOTE — PROGRESS NOTES
Glen Alvarado is a 39 y.o. female evaluated via telephone on 8/10/2021. Consent:  She and/or health care decision maker is aware that that she may receive a bill for this telephone service, depending on her insurance coverage, and has provided verbal consent to proceed: Yes      Documentation:  I communicated with the patient and/or health care decision maker about post operative care and postoperative pain. .   Details of this discussion including any medical advice provided: refer to note below. I affirm this is a Patient Initiated Episode with a Patient who has not had a related appointment within my department in the past 7 days or scheduled within the next 24 hours. Patient identification was verified at the start of the visit: Yes    Total Time: minutes: 21-30 minutes    The visit was conducted pursuant to the emergency declaration under the [address] [place] [address], [address] [place] [address] authority and the [employer] Act. Patient identification was verified, and a caregiver was present when appropriate. The patient was located in a state where the provider was credentialed to provide care. Note: not billable if this call serves to triage the patient into an appointment for the relevant concern      Mohit Esparza MD       Glen Alvarado  8/10/2021  5:06 PM      Glen Alvarado  Procedure: H-scope, removal of Mirena IUD, D&C, jayden endometrial ablation      Glen Alvarado is a 39 y.o. female [id number]      The patient was conducted by phone today due in person visit not being possible due to me feeling ill today and to avoid exposing any patients to possible URI. She reports onset of pelvic and lower back pain that started 8/5/21 after having intercourse. . She denied any shortness of breath, chest p or dizziness. She denied any nausea, vomiting, or diarrhea. There is no fever, chills, or rigors.  The patient denies any vaginal bleeding, discharge or odor. There are no UTI symptoms  Her pain is not relieved with tylenol. Pt refuses to take ibuprofen due seeing a report on TV advising against that med for patients who have COVID infection. Pt advised that information is no longer believed to be true. She is requesting additional Norco that was prescribed for 24 hr post op. Pt offered gabapentin as an alternative. She expressed displeasure this visit was being conducted by phone. I immediately offered in person visit tomorrow with an other provider expressing her wish to see only me. She uttered a comment that there was a financial motive on my part not to she her. She did not provide further explanation. Pt informed my staff will contact her to schedule appointment and the offer remains for her to see another qualified OB /Gyn in our office. not currently breastfeeding. Assessment:     POD# 15     Stable   Pathology reviewed and found to be benign. Yes    Plan:  1. Postoperative examination  - gabapentin for postop pain  - additional narcotics refused        Return in about 1 week (around 8/17/2021) for follow up. Continue with restrictions. Stressed no intercourse until evaluation in office is completed   Pelvic rest. No  intercourse. No douching or tampons.    Return to office 1 weeks

## 2021-08-10 NOTE — TELEPHONE ENCOUNTER
Phone call to schedule appt for patient for 8/17 with Dr. Joe Garcia. Patient said she is driving and unable to take call. She was advised to call to schedule appointment.

## 2021-08-25 PROBLEM — Z98.890 POST-OPERATIVE STATE: Status: RESOLVED | Noted: 2021-07-25 | Resolved: 2021-08-25

## 2021-09-07 ENCOUNTER — HOSPITAL ENCOUNTER (OUTPATIENT)
Age: 46
Setting detail: SPECIMEN
Discharge: HOME | End: 2021-09-07
Payer: COMMERCIAL

## 2021-09-07 ENCOUNTER — ANCILLARY PROCEDURE (OUTPATIENT)
Dept: OBGYN | Age: 46
End: 2021-09-07
Payer: COMMERCIAL

## 2021-09-07 ENCOUNTER — OFFICE VISIT (OUTPATIENT)
Dept: OBGYN | Age: 46
End: 2021-09-07
Payer: COMMERCIAL

## 2021-09-07 ENCOUNTER — NURSE TRIAGE (OUTPATIENT)
Dept: OTHER | Facility: CLINIC | Age: 46
End: 2021-09-07

## 2021-09-07 VITALS
SYSTOLIC BLOOD PRESSURE: 118 MMHG | HEART RATE: 80 BPM | DIASTOLIC BLOOD PRESSURE: 80 MMHG | WEIGHT: 293 LBS | BODY MASS INDEX: 58.59 KG/M2

## 2021-09-07 DIAGNOSIS — N94.6 PAINFUL MENSTRUATION: ICD-10-CM

## 2021-09-07 DIAGNOSIS — R10.2 FEMALE PELVIC PAIN: ICD-10-CM

## 2021-09-07 DIAGNOSIS — R10.2 FEMALE PELVIC PAIN: Primary | ICD-10-CM

## 2021-09-07 LAB
ABSOLUTE EOS #: 0.26 K/UL (ref 0–0.44)
ABSOLUTE IMMATURE GRANULOCYTE: <0.03 K/UL (ref 0–0.3)
ABSOLUTE LYMPH #: 2.56 K/UL (ref 1.1–3.7)
ABSOLUTE MONO #: 0.72 K/UL (ref 0.1–1.2)
BASOPHILS # BLD: 1 % (ref 0–2)
BASOPHILS ABSOLUTE: 0.05 K/UL (ref 0–0.2)
DIFFERENTIAL TYPE: ABNORMAL
EOSINOPHILS RELATIVE PERCENT: 3 % (ref 1–4)
HCT VFR BLD CALC: 41.6 % (ref 36.3–47.1)
HEMOGLOBIN: 12.8 G/DL (ref 11.9–15.1)
IMMATURE GRANULOCYTES: 0 %
LYMPHOCYTES # BLD: 26 % (ref 24–43)
MCH RBC QN AUTO: 29.2 PG (ref 25.2–33.5)
MCHC RBC AUTO-ENTMCNC: 30.8 G/DL (ref 28.4–34.8)
MCV RBC AUTO: 95 FL (ref 82.6–102.9)
MONOCYTES # BLD: 7 % (ref 3–12)
NRBC AUTOMATED: 0 PER 100 WBC
PDW BLD-RTO: 15 % (ref 11.8–14.4)
PLATELET # BLD: 261 K/UL (ref 138–453)
PLATELET ESTIMATE: ABNORMAL
PMV BLD AUTO: 12.5 FL (ref 8.1–13.5)
RBC # BLD: 4.38 M/UL (ref 3.95–5.11)
RBC # BLD: ABNORMAL 10*6/UL
SEG NEUTROPHILS: 63 % (ref 36–65)
SEGMENTED NEUTROPHILS ABSOLUTE COUNT: 6.44 K/UL (ref 1.5–8.1)
WBC # BLD: 10.1 K/UL (ref 3.5–11.3)
WBC # BLD: ABNORMAL 10*3/UL

## 2021-09-07 PROCEDURE — G8427 DOCREV CUR MEDS BY ELIG CLIN: HCPCS | Performed by: OBSTETRICS & GYNECOLOGY

## 2021-09-07 PROCEDURE — 99214 OFFICE O/P EST MOD 30 MIN: CPT | Performed by: OBSTETRICS & GYNECOLOGY

## 2021-09-07 PROCEDURE — G8417 CALC BMI ABV UP PARAM F/U: HCPCS | Performed by: OBSTETRICS & GYNECOLOGY

## 2021-09-07 PROCEDURE — 4004F PT TOBACCO SCREEN RCVD TLK: CPT | Performed by: OBSTETRICS & GYNECOLOGY

## 2021-09-07 PROCEDURE — 76856 US EXAM PELVIC COMPLETE: CPT | Performed by: RADIOLOGY

## 2021-09-07 RX ORDER — GABAPENTIN 300 MG/1
CAPSULE ORAL
Qty: 30 CAPSULE | Refills: 1 | Status: SHIPPED | OUTPATIENT
Start: 2021-09-07 | End: 2021-11-02

## 2021-09-07 RX ORDER — NAPROXEN 500 MG/1
500 TABLET ORAL 2 TIMES DAILY PRN
Qty: 60 TABLET | Refills: 1 | Status: SHIPPED | OUTPATIENT
Start: 2021-09-07 | End: 2021-12-02

## 2021-09-07 NOTE — TELEPHONE ENCOUNTER
Reason for Disposition   Sounds like a life-threatening emergency to the triager    Answer Assessment - Initial Assessment Questions  1. SYMPTOM: \"What's the main symptom you're concerned about? \" (e.g., pain, fever, vomiting)      Vaginal bleeding this morning - bed covers were filled    2. ONSET: \"When did symptoms  start? \"      Onset was this morning    3. SURGERY: \"What surgery was performed? \"      July 26th was when she had a D&C    4. DATE of SURGERY: \"When was surgery performed? \"       July 26th    5. ANESTHESIA: \" What type of anesthesia did you have? \" (e.g., general, spinal, epidural, local)      Unknown    6. PAIN: \"Is there any pain? \" If so, ask: \"How bad is it? \"  (Scale 1-10; or mild, moderate, severe)      Abdominal pain 10/10 - taking Tylenol with no results    7. FEVER: \"Do you have a fever? \" If so, ask: \"What is your temperature, how was it measured, and when did it start? \"      No - but had chills yesterday    8. VOMITING: \"Is there any vomiting? \" If yes, ask: \"How many times? \"      No nausea/vomiting    9. BLEEDING: \"Is there any bleeding? \" If so, ask: \"How much? \" and \"Where? \"      Yes - see item 1    10. OTHER SYMPTOMS: \"Do you have any other symptoms? \" (e.g., drainage from wound, painful urination, constipation)        Abdominal pain and vaginal bleeding    Protocols used: POST-OP SYMPTOMS AND QUESTIONS-ADULT-OH    Received call from Alexandria Robertson at [facility] with [employer]. Brief description of triage: Patient reports severe abdominal pain and vaginal bleeding post D & C. Patient states that her surgeon told her she should not have any vaginal bleeding after this surgery. She woke this morning with pain (10/10) and with her bed clothes covered in blood. Patient denies fever, dizziness or a racing heart. Triage indicates for patient to be seen in the ED.     Care advice provided, patient verbalizes understanding; denies any other questions or concerns; instructed to call back for any new or worsening symptoms. Attention Provider: Thank you for allowing me to participate in the care of your patient. The patient was connected to triage in response to information provided to the ECC. Please do not respond through this encounter as the response is not directed to a shared pool.

## 2021-09-07 NOTE — PROGRESS NOTES
Chelita Ortiz  9/7/2021  2:12 PM      Chelita Ortiz  Procedure: removal Mirena IUD, H-scope, D&C , Adrianna endometrial ablation on 7/26/21      Chelita Ortiz is a 39 y.o. female [id number] The patient was experiencing pelvic pain earlier last month but had been pain free and without bleeding until she woke up this AM with vaginal bleeding that she reports as heavy and severe pelvic cramping. She reports # 10 pain. She has had some constipation that is relieved with OTC stool softener. There has been no fever, no urinary symptoms. Blood pressure 118/80, pulse 80, weight 300 lb (136.1 kg), not currently breastfeeding. Abdominal Exam: Soft, mild suprapubic tenderness. . No guarding, rebound or rigidity. Extremities: No edema or calf pain noted bilaterally. Pelvic Exam:External genitalia: normal general appearance  Vaginal: presence of small amount of menstrual  blood  Cervix: normal appearance  Adnexa: non palpable  Uterus: anterior, tender. Not grossly enlarged    Ultrasound completed in the office was negative for hematometra. Refer to Imaging tab for complete report         Assessment / Plan :  1. Female pelvic pain, dysmenorrhea S/P endometrial . Hematometra , infection not strongly suspected    - US PELVIS COMPLETE; Future  - naproxen (NAPROSYN) 500 MG tablet; Take 1 tablet by mouth 2 times daily as needed for Pain  Dispense: 60 tablet; Refill: 1  - CBC With Auto Differential; Future    2. Painful menstruation  - discussed post ablation pain and likelihood it may require about 3 month to observe maximum benefit for the ablation.   - naproxen (NAPROSYN) 500 MG tablet; Take 1 tablet by mouth 2 times daily as needed for Pain  Dispense: 60 tablet; Refill: 1  - Refill Gabapentin for prn use  - call prn if symptoms persist or worsen.

## 2021-09-09 PROBLEM — Z09 POSTOPERATIVE EXAMINATION: Status: RESOLVED | Noted: 2021-08-10 | Resolved: 2021-09-09

## 2021-09-20 ENCOUNTER — OFFICE VISIT (OUTPATIENT)
Dept: ORTHOPEDIC SURGERY | Age: 46
End: 2021-09-20
Payer: COMMERCIAL

## 2021-09-20 VITALS — WEIGHT: 293 LBS | HEIGHT: 60 IN | BODY MASS INDEX: 57.52 KG/M2

## 2021-09-20 DIAGNOSIS — S83.242A TEAR OF MEDIAL MENISCUS OF LEFT KNEE, CURRENT, UNSPECIFIED TEAR TYPE, INITIAL ENCOUNTER: ICD-10-CM

## 2021-09-20 DIAGNOSIS — M17.12 ARTHRITIS OF LEFT KNEE: Primary | ICD-10-CM

## 2021-09-20 PROCEDURE — 4004F PT TOBACCO SCREEN RCVD TLK: CPT | Performed by: ORTHOPAEDIC SURGERY

## 2021-09-20 PROCEDURE — G8427 DOCREV CUR MEDS BY ELIG CLIN: HCPCS | Performed by: ORTHOPAEDIC SURGERY

## 2021-09-20 PROCEDURE — 20610 DRAIN/INJ JOINT/BURSA W/O US: CPT | Performed by: ORTHOPAEDIC SURGERY

## 2021-09-20 PROCEDURE — G8417 CALC BMI ABV UP PARAM F/U: HCPCS | Performed by: ORTHOPAEDIC SURGERY

## 2021-09-20 PROCEDURE — 99213 OFFICE O/P EST LOW 20 MIN: CPT | Performed by: ORTHOPAEDIC SURGERY

## 2021-09-20 RX ORDER — METHYLPREDNISOLONE ACETATE 80 MG/ML
80 INJECTION, SUSPENSION INTRA-ARTICULAR; INTRALESIONAL; INTRAMUSCULAR; SOFT TISSUE ONCE
Status: COMPLETED | OUTPATIENT
Start: 2021-09-20 | End: 2021-09-20

## 2021-09-20 RX ORDER — BUPIVACAINE HYDROCHLORIDE 2.5 MG/ML
2 INJECTION, SOLUTION INFILTRATION; PERINEURAL ONCE
Status: COMPLETED | OUTPATIENT
Start: 2021-09-20 | End: 2021-09-20

## 2021-09-20 RX ADMIN — METHYLPREDNISOLONE ACETATE 80 MG: 80 INJECTION, SUSPENSION INTRA-ARTICULAR; INTRALESIONAL; INTRAMUSCULAR; SOFT TISSUE at 11:30

## 2021-09-20 RX ADMIN — BUPIVACAINE HYDROCHLORIDE 5 MG: 2.5 INJECTION, SOLUTION INFILTRATION; PERINEURAL at 11:30

## 2021-09-20 ASSESSMENT — ENCOUNTER SYMPTOMS
VOMITING: 0
ABDOMINAL PAIN: 0
COUGH: 0
APNEA: 0
CHEST TIGHTNESS: 0

## 2021-09-20 NOTE — PROGRESS NOTES
[facility]  [facility]  [address] [place] [address]  Dept: [phone]  Dept Fax: [phone]        Ambulatory Follow Up      Subjective:   Rafi Ramachandran is a 39y.o. year old female who presents to our office today for routine followup regarding her   1. Arthritis of left knee    2. Tear of medial meniscus of left knee, current, unspecified tear type, initial encounter    . Chief Complaint   Patient presents with    Follow-up     REVIEW MRI of LEFT KNEE. pt requesting injection        HPI- Rafi Ramachandran  is a 39 y.o.   female who presents today in follow for left knee pain. The patient was last seen on 6/7/2021 and underwent treatment in the form of MRI of the left knee. The patient mentions that her left knee is still giving out on her and is still very weak. At times she feels she has to compensate for the left knee pain and it is now causing right knee pain. The patient has been taking oral antiinflammatories at this time and has tried physical therapy. The patient feels like therapy did not help but she continues to try a home exercise program.         Review of Systems   Constitutional: Negative for chills and fever. Respiratory: Negative for apnea, cough and chest tightness. Cardiovascular: Negative for chest pain and palpitations. Gastrointestinal: Negative for abdominal pain and vomiting. Genitourinary: Negative for difficulty urinating. Musculoskeletal: Positive for arthralgias (left knee). Negative for gait problem, joint swelling and myalgias. Neurological: Negative for dizziness, weakness and numbness. I have reviewed the CC, HPI, ROS, PMH, FHX, Social History, and if not present in this note, I have reviewed in the patient's chart. I agree with the documentation provided by other staff and have reviewed their documentation prior to providing my signature indicating agreement.     Objective :   Ht 5' (1.524 m)   Wt (!) 304 lb (137.9 kg)   BMI 59.37 kg/m²  Body mass index is 59.37 kg/m². General: Teagan Hannon is a 39 y.o. female who is alert and oriented and sitting comfortably in our office. Ortho Exam  MS:  Nearly full range of motion of the left knee. Evaluation of the Left knee reveals no significant outward deformity. There is no erythema, warmth, skin lesions, signs of infection. There is tenderness over the medial joint line. There is a mildknee effusion. Range of motion of the Left knee is  nearly full. No instability of the knee is appreciated at 0 and 30° of flexion. There is a negative anterior drawer Lachman's test.  There is increased pain with varus Melvin's testing. No calf tenderness is noted. There is a negative hip log roll and Stinchfield test.  Motor, sensory, vascular examination to the Left lower extremity is intact. Patient has full range of motion of the ankle. Neuro: alert and oriented to person and place. Eyes: Extra-ocular muscles intact  Mouth: Oral mucosa moist. No perioral lesions  Pulm: Respirations unlabored and regular. Symmetric chest excursion without outward deformity is noted. Skin: warm, well perfused  Psych:   Patient has good fund of knowledge and displays understanging of exam, diagnosis, and plan.     Radiology:   Narrative   EXAMINATION:   MRI OF THE LEFT KNEE WITHOUT CONTRAST, 7/1/2021 8:44 pm       TECHNIQUE:   Multiplanar multisequence MRI of the left knee was performed without the   administration of intravenous contrast.       COMPARISON:   Left knee plain radiographs from 03/15/2021       HISTORY:   ORDERING SYSTEM PROVIDED HISTORY: Arthritis of left knee   TECHNOLOGIST PROVIDED HISTORY:   rule out tears   Is the patient pregnant?->No   Reason for Exam: Pt c/o left knee pain, more lateral and radiates down leg.       47-year-old female who complains of left knee pain       FINDINGS:   MENISCI: Lateral meniscus demonstrates normal morphology and signal mixture of 2 mL of 0.25% Marcaine and 80 mg of Depo-Medrol. Patient tolerated the procedure well without post injection complications. I instructed the patient to call our office immediately if they have any swelling or increased pain at the injection site. Assessment:      1. Arthritis of left knee    2. Tear of medial meniscus of left knee, current, unspecified tear type, initial encounter       Plan:     Reviewed MRI results with the patient today in the office. Discussed etiology and natural history of left knee arthritis and left knee medial meniscus tear. The treatment options may include oral anti-inflammatories, bracing, injections, advanced imaging, activity modification, physical therapy and/or surgical intervention. Had a lengthy discussion with the patient about weight loss and how it would benefit her overall. The patient would like to proceed with corticosteroid injection to the left knee, oral antiinflammatories, and home exercise program.  The patient will follow up in the office as needed. We discussed that the patient should call us with any concerns or questions. Follow up:Return if symptoms worsen or fail to improve. Orders Placed This Encounter   Medications    methylPREDNISolone acetate (DEPO-MEDROL) injection 80 mg    bupivacaine (MARCAINE) 0.25 % injection 5 mg         Orders Placed This Encounter   Procedures    [id number] - DRAIN/INJECT LARGE JOINT/BURSA     I, Michelle Loza LPN am scribing for and in the presence of Dr. Shama Flores  9/20/2021 11:28 AM      I have reviewed and made changes accordingly to the work scribed by Michelle Loza LPN. The documentation accurately reflects work and decisions made by me. I have also reviewed documentation completed by clinical staff.     Shama Flores DO, [facility]  9/21/2021 2:15 PM    This note is created with the assistance of a speech recognition program.  While intending to generate a document that actually reflects the content of the visit, the document can still have some errors including those of syntax and sound a like substitutions which may escape proof reading.  In such instances, actual meaning can be extrapolated by contextual diversion      Electronically signed by Lavena Finders, LPN, Erenest Rell on 6/27/6697 at 11:28 AM

## 2021-09-23 RX ORDER — ACETAMINOPHEN 500 MG
500 TABLET ORAL EVERY 6 HOURS PRN
Qty: 120 TABLET | Refills: 3 | Status: SHIPPED | OUTPATIENT
Start: 2021-09-23 | End: 2022-01-26 | Stop reason: SDUPTHER

## 2021-09-24 ENCOUNTER — OFFICE VISIT (OUTPATIENT)
Dept: INTERNAL MEDICINE | Age: 46
End: 2021-09-24
Payer: COMMERCIAL

## 2021-09-24 VITALS
BODY MASS INDEX: 57.52 KG/M2 | SYSTOLIC BLOOD PRESSURE: 131 MMHG | DIASTOLIC BLOOD PRESSURE: 88 MMHG | HEART RATE: 70 BPM | WEIGHT: 293 LBS | HEIGHT: 60 IN | TEMPERATURE: 98.7 F

## 2021-09-24 DIAGNOSIS — G89.29 CHRONIC BILATERAL LOW BACK PAIN WITHOUT SCIATICA: ICD-10-CM

## 2021-09-24 DIAGNOSIS — Z72.0 TOBACCO ABUSE: ICD-10-CM

## 2021-09-24 DIAGNOSIS — E11.9 TYPE 2 DIABETES MELLITUS WITHOUT COMPLICATION, WITHOUT LONG-TERM CURRENT USE OF INSULIN (HCC): Primary | ICD-10-CM

## 2021-09-24 DIAGNOSIS — I10 ESSENTIAL HYPERTENSION: ICD-10-CM

## 2021-09-24 DIAGNOSIS — Z23 NEED FOR PROPHYLACTIC VACCINATION AGAINST DIPHTHERIA-TETANUS-PERTUSSIS (DTP): ICD-10-CM

## 2021-09-24 DIAGNOSIS — L73.2 HIDRADENITIS SUPPURATIVA: ICD-10-CM

## 2021-09-24 DIAGNOSIS — M54.50 CHRONIC BILATERAL LOW BACK PAIN WITHOUT SCIATICA: ICD-10-CM

## 2021-09-24 LAB — HBA1C MFR BLD: 6.3 %

## 2021-09-24 PROCEDURE — 3044F HG A1C LEVEL LT 7.0%: CPT | Performed by: STUDENT IN AN ORGANIZED HEALTH CARE EDUCATION/TRAINING PROGRAM

## 2021-09-24 PROCEDURE — G8417 CALC BMI ABV UP PARAM F/U: HCPCS | Performed by: STUDENT IN AN ORGANIZED HEALTH CARE EDUCATION/TRAINING PROGRAM

## 2021-09-24 PROCEDURE — 99213 OFFICE O/P EST LOW 20 MIN: CPT | Performed by: STUDENT IN AN ORGANIZED HEALTH CARE EDUCATION/TRAINING PROGRAM

## 2021-09-24 PROCEDURE — 99211 OFF/OP EST MAY X REQ PHY/QHP: CPT | Performed by: INTERNAL MEDICINE

## 2021-09-24 PROCEDURE — 90715 TDAP VACCINE 7 YRS/> IM: CPT | Performed by: INTERNAL MEDICINE

## 2021-09-24 PROCEDURE — 4004F PT TOBACCO SCREEN RCVD TLK: CPT | Performed by: STUDENT IN AN ORGANIZED HEALTH CARE EDUCATION/TRAINING PROGRAM

## 2021-09-24 PROCEDURE — 83036 HEMOGLOBIN GLYCOSYLATED A1C: CPT | Performed by: STUDENT IN AN ORGANIZED HEALTH CARE EDUCATION/TRAINING PROGRAM

## 2021-09-24 PROCEDURE — 2022F DILAT RTA XM EVC RTNOPTHY: CPT | Performed by: STUDENT IN AN ORGANIZED HEALTH CARE EDUCATION/TRAINING PROGRAM

## 2021-09-24 PROCEDURE — G8427 DOCREV CUR MEDS BY ELIG CLIN: HCPCS | Performed by: STUDENT IN AN ORGANIZED HEALTH CARE EDUCATION/TRAINING PROGRAM

## 2021-09-24 RX ORDER — AMLODIPINE BESYLATE 10 MG/1
10 TABLET ORAL DAILY
Qty: 30 TABLET | Refills: 3 | Status: SHIPPED | OUTPATIENT
Start: 2021-09-24 | End: 2022-03-31 | Stop reason: SDUPTHER

## 2021-09-24 RX ORDER — DOXYCYCLINE HYCLATE 100 MG/1
CAPSULE ORAL
Qty: 60 CAPSULE | Refills: 0 | Status: SHIPPED | OUTPATIENT
Start: 2021-09-24 | End: 2021-11-02

## 2021-09-24 RX ORDER — TIZANIDINE 4 MG/1
4 TABLET ORAL EVERY 8 HOURS PRN
Qty: 90 TABLET | Refills: 0 | Status: SHIPPED | OUTPATIENT
Start: 2021-09-24 | End: 2021-12-02

## 2021-09-24 ASSESSMENT — ENCOUNTER SYMPTOMS
VOMITING: 0
BLOOD IN STOOL: 0
DIARRHEA: 0
NAUSEA: 0
CHEST TIGHTNESS: 0
WHEEZING: 0
SHORTNESS OF BREATH: 0
COUGH: 0
ABDOMINAL PAIN: 0
BACK PAIN: 1
CONSTIPATION: 0

## 2021-09-24 NOTE — PATIENT INSTRUCTIONS
Medications e-scribe to pharmacy of pt's choice. Script for lab given to pt, no fasting required. Pt will get labs done before next appt . Pt was added to wait list for 6 months. An After Visit Summary was printed and given to the patient.   BAR

## 2021-09-24 NOTE — PROGRESS NOTES
Attending Physician Statement  I have discussed the care of Erroll Snellen including pertinent history and exam findings,  with the resident. I have reviewed the key elements of all parts of the encounter with the resident. I agree with the assessment, plan and orders as documented by the resident.   (GE Modifier)    MD MANNIE Nolan  Attending Physician, [address] [place] [address], Internal Medicine Residency Program  [address] [place] [address]  9/24/2021, 2:06 PM ambulate

## 2021-09-24 NOTE — PROGRESS NOTES
[facility]    New Patient Note/History and Physical    Date of patient's visit: 9/24/2021    Name:  Ting Mariano      YOB: 1975    Patient Care Team:  Braden Durand MD as PCP - General (Internal Medicine)    REASON FOR VISIT: First Visit, establish care     HISTORY OF PRESENTING ILLNESS:    History was obtained from the patient. Ting Mariano is a 39 y.o. is here to for a follow up visit and transition of care. Patient past medical history of type 2 diabetes, hypertension, osteoarthritis of left knee, hidradenitis suppurativa is here for follow-up visit. Hypertension: Controlled. Blood pressure during this visit 131/88. Patient states that she takes amlodipine 10 mg daily and denies any complaints of chest pain, palpitations, headaches, dizziness, blurring of vision, shortness of breath or ankle swelling. Type 2 diabetes: Controlled. HbA1c 6.3 today. Patient takes Metformin 500 mg daily. Osteoarthritis of left knee: Patient states that she has a lot of pain in the left knee, back and left ankle. Patient states that she follows with Dr. Mariela Carmona with orthopedics and is currently taking ibuprofen, naproxen, gabapentin. Patient is tearful and notes that she has to lose weight. Patient's BMI is 59. Patient states that she is trying to lose weight and recently got shot in her left knee by Dr. Mariela Carmona. Hidradenitis suppurativa: Currently taking doxycycline, clindamycin topically and benzyl peroxide body washes. Patient is a current smoker and smokes half a pack of cigarettes every day and has been smoking for the past 30 years.     PAST MEDICAL AND SURGICAL HISTORY:          Diagnosis Date    Abnormal uterine bleeding (AUB)     Anemia     Arthritis     KNEE AND BACK    Asthma     Diabetes mellitus (HCC)     GERD (gastroesophageal reflux disease)     Hidradenitis suppurativa     History of blood transfusion 1997    Hyperlipidemia     Hypertension     Leg swelling     Obesity, morbid, BMI 50 or higher ([address]     Sleep apnea     needs new machine    Wears dentures     never wears them    Wellness examination 07/12/2021    [facility]-last visit june 2021           Procedure Laterality Date    ABSCESS DRAINAGE  12/23/2016    BUTTOCK    DILATION AND CURETTAGE OF UTERUS N/A 11/18/2019    DILATATION AND CURETTAGE HYSTEROSCOPY, MYOSURE, MIRENA IUD INSERTION performed by Sharron Sandhoff, MD at [address] N/A 7/26/2021    IUD REMOVAL, HYSTEROSCOPY,  MICHELE ENDOMETRIAL ABLATION REP CONFIRMED BY OFFC performed by Sharron Sandhoff, MD at [facility] N/A 7/26/2021    DILATATION AND CURETTAGE performed by Sharron Sandhoff, MD at [address] [facility] [place] N/A 07/02/2018    RECTAL PERIRECTAL INCISION AND DRAINAGE performed by Pravin Bell DO at [address] [place] [address] Mainor:    TOBACCO:   reports that she has been smoking cigarettes. She has a 15.00 pack-year smoking history. She has never used smokeless tobacco.  ETOH:   reports previous alcohol use. DRUGS:  reports current drug use. Frequency: 1.50 times per week. Drug: Marijuana. OCCUPATION:      ALLERGIES:    No Known Allergies      HOME MEDICATION:      Current Outpatient Medications on File Prior to Visit   Medication Sig Dispense Refill    acetaminophen (ACETAMINOPHEN EXTRA STRENGTH) 500 MG tablet Take 1 tablet by mouth every 6 hours as needed for Pain 120 tablet 3    naproxen (NAPROSYN) 500 MG tablet Take 1 tablet by mouth 2 times daily as needed for Pain 60 tablet 1    gabapentin (NEURONTIN) 300 MG capsule 1 capsule daily . If no improvement after 1 - 2 days increase to 1 capsule twice daily.  30 capsule 1    metFORMIN (GLUCOPHAGE) 500 MG tablet Take 1 tablet by mouth daily (with breakfast) 30 tablet 1    amLODIPine (NORVASC) 10 MG tablet Take 1 tablet by mouth daily 30 tablet 3    tiZANidine (ZANAFLEX) 4 MG tablet Take 1 tablet by mouth every 8 hours as needed (back pain) 90 tablet 0    doxycycline hyclate (VIBRAMYCIN) 100 MG capsule take 1 capsule by mouth twice a day with food 60 capsule 0    benzoyl peroxide 5 % external liquid Wash face, chest and back 1-2 times daily 227 g 3    clindamycin (CLEOCIN T) 1 % lotion Apply to affected areas daily 60 mL 3     No current facility-administered medications on file prior to visit. FAMILY HISTORY:          Problem Relation Age of Onset   [facility] Vision Loss Mother     Glaucoma Mother     Diabetes Father     High Blood Pressure Father     Glaucoma Father     Vision Loss Father     Kidney Cancer Maternal Grandmother     Kidney stones Brother     No Known Problems Maternal Grandfather     Diabetes Paternal Grandmother     Other Brother         stomach problem       REVIEW OF SYSTEMS:        Review of Systems   Constitutional: Negative for activity change, appetite change and fever. HENT: Negative for congestion. Respiratory: Negative for cough, chest tightness, shortness of breath and wheezing. Cardiovascular: Negative for chest pain, palpitations and leg swelling. Gastrointestinal: Negative for abdominal pain, blood in stool, constipation, diarrhea, nausea and vomiting. Genitourinary: Negative for difficulty urinating. Musculoskeletal: Positive for arthralgias, back pain and joint swelling. Neurological: Negative for dizziness, light-headedness and headaches. Psychiatric/Behavioral: Negative for behavioral problems, confusion and decreased concentration. PHYSICAL EXAM:      Vitals:    09/24/21 1322   BP: 131/88   Pulse: 70   Temp: 98.7 °F (37.1 °C)   TempSrc: Temporal   Weight: (!) 302 lb 9.6 oz (137.3 kg)   Height: 5' (1.524 m)     Physical Exam  Constitutional:       Appearance: Normal appearance. HENT:      Head: Normocephalic and atraumatic.    Eyes:      Extraocular Movements: Extraocular movements intact. Pupils: Pupils are equal, round, and reactive to light. Cardiovascular:      Rate and Rhythm: Normal rate and regular rhythm. Pulses: Normal pulses. Heart sounds: Normal heart sounds. No murmur heard. Pulmonary:      Effort: Pulmonary effort is normal. No respiratory distress. Breath sounds: Normal breath sounds. No wheezing. Chest:      Chest wall: No tenderness. Abdominal:      General: Abdomen is flat. Bowel sounds are normal. There is no distension. Palpations: Abdomen is soft. Tenderness: There is no abdominal tenderness. There is no guarding. Musculoskeletal:      Right lower leg: No edema. Left lower leg: No edema. Neurological:      Mental Status: She is alert and oriented to person, place, and time. Psychiatric:         Behavior: Behavior normal.           LABORATORY FINDINGS:    CBC:   Lab Results   Component Value Date    WBC 10.1 09/07/2021    HGB 12.8 09/07/2021     09/07/2021     BMP:    Lab Results   Component Value Date     07/12/2021    K 3.9 07/12/2021     07/12/2021    CO2 25 07/12/2021    BUN 10 07/12/2021    CREATININE 0.38 07/12/2021    GLUCOSE 107 07/12/2021     Hemoglobin A1C:   Lab Results   Component Value Date    LABA1C 6.3 09/24/2021     Lipid profile:   Lab Results   Component Value Date    CHOL 149 03/01/2021    TRIG 118 03/01/2021    HDL 44 03/01/2021     Thyroid functions:   Lab Results   Component Value Date    TSH 2.27 08/16/2019      Hepatic functions:   Lab Results   Component Value Date    ALT 12 05/11/2021    AST 11 05/11/2018    PROT 7.2 05/11/2018    BILITOT 0.19 05/11/2018    BILIDIR <0.08 05/11/2018    LABALBU 3.7 05/11/2018     ASSESSMENT AND PLAN:   Joy Álvarez was seen today for establish care, health maintenance, diabetes, hypertension and medication refill.     Diagnoses and all orders for this visit:    Type 2 diabetes mellitus without complication, without long-term current use of insulin (HCC)  -     POCT glycosylated hemoglobin (Hb A1C) - 6.3  -Continue patient Metformin 500 mg daily    Essential hypertension  -Continue patient on amlodipine 10 mg daily    Hidradenitis suppurativa  -Can you patient doxycycline, clindamycin ointment and benzyl peroxide body washes  -Encouraged for weight reduction and smoking cessation    Tobacco abuse  -Encouraged again smoking    Need for prophylactic vaccination against diphtheria-tetanus-pertussis (DTP)    Chronic bilateral low back pain without sciatica        INSTRUCTIONS:   No follow-ups on file. · Mundo Billy received counseling on the following healthy behaviors: nutrition, exercise, medication adherence and tobacco cessation    · Reviewed prior labs and health maintenance. · Discussed use, benefit, and side effects of prescribed medications. Barriers to medication compliance addressed. All patient questions answered. Pt voiced understanding. · Patient given educational materials - see patient instructions        Dorothea Menon MD  Internal Medicine Resident, PGY-2  [facility];  Samson Reza  5/55/5979,3:66 PM

## 2021-09-29 ENCOUNTER — HOSPITAL ENCOUNTER (OUTPATIENT)
Age: 46
Setting detail: SPECIMEN
Discharge: HOME | End: 2021-09-29
Payer: COMMERCIAL

## 2021-09-29 DIAGNOSIS — E11.9 TYPE 2 DIABETES MELLITUS WITHOUT COMPLICATION, WITHOUT LONG-TERM CURRENT USE OF INSULIN (HCC): ICD-10-CM

## 2021-09-29 LAB
CREATININE URINE: 342.1 MG/DL (ref 28–217)
CREATININE URINE: 345.3 MG/DL (ref 28–217)
MICROALBUMIN/CREAT 24H UR: 28 MG/L
MICROALBUMIN/CREAT UR-RTO: 8 MCG/MG CREAT

## 2021-10-04 ENCOUNTER — HOSPITAL ENCOUNTER (EMERGENCY)
Age: 46
Discharge: HOME | End: 2021-10-04
Attending: EMERGENCY MEDICINE
Payer: COMMERCIAL

## 2021-10-04 VITALS
OXYGEN SATURATION: 98 % | HEIGHT: 60 IN | BODY MASS INDEX: 57.52 KG/M2 | DIASTOLIC BLOOD PRESSURE: 78 MMHG | WEIGHT: 293 LBS | RESPIRATION RATE: 20 BRPM | HEART RATE: 78 BPM | SYSTOLIC BLOOD PRESSURE: 145 MMHG | TEMPERATURE: 98.2 F

## 2021-10-04 DIAGNOSIS — L73.2 HIDRADENITIS SUPPURATIVA: Primary | ICD-10-CM

## 2021-10-04 DIAGNOSIS — N61.1 BREAST ABSCESS: ICD-10-CM

## 2021-10-04 PROCEDURE — 99283 EMERGENCY DEPT VISIT LOW MDM: CPT

## 2021-10-04 RX ORDER — CEPHALEXIN 500 MG/1
500 CAPSULE ORAL 4 TIMES DAILY
Qty: 40 CAPSULE | Refills: 0 | Status: SHIPPED | OUTPATIENT
Start: 2021-10-04 | End: 2021-10-14

## 2021-10-04 RX ORDER — SULFAMETHOXAZOLE AND TRIMETHOPRIM 800; 160 MG/1; MG/1
1 TABLET ORAL 2 TIMES DAILY
Qty: 20 TABLET | Refills: 0 | Status: SHIPPED | OUTPATIENT
Start: 2021-10-04 | End: 2021-10-14

## 2021-10-04 RX ORDER — FLUCONAZOLE 150 MG/1
150 TABLET ORAL ONCE
Qty: 1 TABLET | Refills: 0 | Status: SHIPPED | OUTPATIENT
Start: 2021-10-04 | End: 2021-10-04

## 2021-10-04 ASSESSMENT — PAIN DESCRIPTION - LOCATION: LOCATION: BREAST

## 2021-10-04 ASSESSMENT — PAIN SCALES - GENERAL: PAINLEVEL_OUTOF10: 10

## 2021-10-04 ASSESSMENT — PAIN DESCRIPTION - PAIN TYPE: TYPE: ACUTE PAIN

## 2021-10-04 ASSESSMENT — PAIN DESCRIPTION - ORIENTATION: ORIENTATION: LEFT

## 2021-10-04 NOTE — ED PROVIDER NOTES
[address] ED  eMERGENCY dEPARTMENT eNCOUnter      Pt Name: Carley Phillips  MRN: [id number]  [place] 1975  Date of evaluation: 10/4/21      CHIEF COMPLAINT:  Chief Complaint   Patient presents with    Skin Problem       HISTORY OF PRESENT ILLNESS    Carley Phillips is a 39 y.o. female with hx of hidradenitis suppurativa presents to the emergency room complaining of flare to her left breast.  States that they noticed this area painful, red and swollen for the past 3 days. She denies drainage, fever, chills, nausea, emesis. Pt states she has been on doxycycline for 6 years. Pt has been taking motrin, tylenol for pain. No other complaints. Nursing Notes were reviewed. REVIEW OF SYSTEMS       Constitutional:  Per HPI  Eyes: No visual changes. Neck: No neck pain. Respiratory: Denies recent shortness of breath. Cardiac:  Denies recent chest pain. GI:  Per HPI  Musculoskeletal: Denies focal weakness. Neurologic: Denies headache or focal weakness. Skin:  rash    Negative in 10 essential Systems except as mentioned above and in the HPI. PAST MEDICAL HISTORY   PMH:  has a past medical history of Abnormal uterine bleeding (AUB), Anemia, Arthritis, Asthma, Diabetes mellitus ([address] GERD (gastroesophageal reflux disease), Hidradenitis suppurativa, History of blood transfusion, Hyperlipidemia, Hypertension, Leg swelling, Obesity, morbid, BMI 50 or higher ([address] Sleep apnea, Wears dentures, and Wellness examination. Surgical History:  has a past surgical history that includes Tubal ligation (1997); Abscess Drainage (12/23/2016); pr rectum surgery procedure unlisted (N/A, 07/02/2018); Dilation and curettage of uterus (N/A, 11/18/2019); Dilation and curettage of uterus (N/A, 7/26/2021); and Dilation and curettage of uterus (N/A, 7/26/2021). Social History:  reports that she has been smoking cigarettes. She has a 15.00 pack-year smoking history.  She has never used smokeless tobacco. She reports previous alcohol use. She reports current drug use. Frequency: 1.50 times per week. Drug: Marijuana. Family History: None  Psychiatric History: None    Allergies:has No Known Allergies. PHYSICAL EXAM     INITIAL VITALS: BP (!) 145/78   Pulse 78   Temp 98.2 °F (36.8 °C) (Oral)   Resp 20   Ht 5' (1.524 m)   Wt (!) 303 lb (137.4 kg)   SpO2 98%   BMI 59.18 kg/m²   Constitutional:  Well developed   Eyes:  Pupils equal and readily reactive to light  HENT:  Atraumatic, external ears normal, nose normal, oropharynx moist. Neck- supple   Respiratory:  Clear to auscultation bilaterally with good air exchange, no W/R/R  Cardiovascular:  RRR with normal S1 and S2  Gastrointestinal/Abdomen:  Soft, NT.  BS present. Musculoskeletal:  No edema, no tenderness, no deformities. Back:  No CVA tenderness. Normal to inspection. Integument:  HS flare noted to lateral side of left breast in skin fold. There is induration, pockets of fluctuance. Tender. There is no erythema. No streaking. No drainage. No nipple/ areola involvement. Neurologic:  Alert & oriented x 3, no focal deficits noted       DIAGNOSTIC RESULTS     EKG: All EKG's are interpreted by the Emergency Department Physician who either signs or Co-signs this chart in the absence of a cardiologist.  Not indicated    RADIOLOGY:   Reviewed the radiologist:  No orders to display     Not indicated      LABS:  Labs Reviewed - No data to display      EMERGENCY DEPARTMENT COURSE:   -------------------------  HS flare of lateral left breast in skin folds. There are areas of induration, fluctuance. Very tender. No erythema or drainage. No fevers, chills, emesis, nausea. Pt takes doxycycline daily. Does Not have a surgeon. I did offer I&D to the patient. Procedure discussed in detail. She refused. States she is in too much pain and is afraid of needles. Will start patient on bactrim, keflex, diflucan.   Informed patient that abx may not improve condition and she may have to come back to the ED. She understands. Referral was placed to Dr. Rosalva Apgar. Pt instructed to call office upon discharge from the ED. Recommend warm compresses. Strict return precautions discussed with patient at bedside. She is agreeable. Discussed results and plan with the pt. They expressed appropriate understanding. Pt given close follow up, supportive care instructions and strict return instructions at the bedside. Patient was given oral antibiotics for bacterial coverage and both verbal and written instructions to follow up to ED or Family Doctor in two days for recheck and/or packing change. Was instructed to return for any worsening of the abscess or surrounding cellulitis. Orders Placed This Encounter   Medications    sulfamethoxazole-trimethoprim (BACTRIM DS) 800-160 MG per tablet     Sig: Take 1 tablet by mouth 2 times daily for 10 days     Dispense:  20 tablet     Refill:  0    cephALEXin (KEFLEX) 500 MG capsule     Sig: Take 1 capsule by mouth 4 times daily for 10 days     Dispense:  40 capsule     Refill:  0    fluconazole (DIFLUCAN) 150 MG tablet     Sig: Take 1 tablet by mouth once for 1 dose     Dispense:  1 tablet     Refill:  0       CONSULTS:  None      FINAL IMPRESSION      1. Hidradenitis suppurativa    2.  Breast abscess          DISPOSITION/PLAN:  DISPOSITION Decision To Discharge 10/04/2021 10:29:01 AM        PATIENT REFERRED TO:  Tk Recio MD  [facility] access [facility] [address]  [phone]          [facility] OR [facility] ED  [facility] [id number] [address], [address] [facility] [place] [address]  [phone]    Call       Benedicto Womack MD  [facility]  [phone]            DISCHARGE MEDICATIONS:  New Prescriptions    CEPHALEXIN (KEFLEX) 500 MG CAPSULE    Take 1 capsule by mouth 4 times daily for 10 days    FLUCONAZOLE (DIFLUCAN) 150 MG TABLET    Take 1 tablet by mouth once for 1 dose    SULFAMETHOXAZOLE-TRIMETHOPRIM (BACTRIM DS) 800-160 MG PER TABLET    Take 1 tablet by mouth 2 times daily for 10 days       (Please note that portions of this note were completed with a voice recognition program.  Efforts were made to edit the dictations but occasionally words are mis-transcribed.)    Branson Fabry, [address], [place]  10/04/21 9190

## 2021-10-04 NOTE — ED TRIAGE NOTES
Mode of arrival (squad #, walk in, police, etc) : Walk In        Chief complaint(s): Skin problem        Arrival Note (brief scenario, treatment PTA, etc). : Pt arrives to ED c/o pain under her left breast. Patient reports a history of HS and states that she is having a flare up of it. C= \"Have you ever felt that you should Cut down on your drinking? \"  No  A= \"Have people Annoyed you by criticizing your drinking? \"  No  G= \"Have you ever felt bad or Guilty about your drinking? \"  No  E= \"Have you ever had a drink as an Eye-opener first thing in the morning to steady your nerves or to help a hangover? \"  No      Deferred []      Reason for deferring: N/A    *If yes to two or more: probable alcohol abuse. *

## 2021-10-04 NOTE — ED NOTES
Pt tearful at this time, states \"this medication (doxycycline) isn't working, I need something that's going to help me. \" Pt states she has been taking her prescribed doxy for the past 6 years as written, as well as ibuprofen and acetaminophen intermittently for pain control. Pt states this episode w/ her breasts started approx. 3 days ago, but states the L is worse than the R at this time.       Izzy Wiley, RN  10/04/21 [id number] Joy Mathew, TORSTEN  10/04/21 1024

## 2021-10-05 NOTE — ED PROVIDER NOTES
[address] ED  eMERGENCY dEPARTMENT eNCOUnter   Independent Attestation     Pt Name: Michoacano Ceballos  MRN: [id number]  [place] 1975  Date of evaluation: 10/4/21   Michoacano Ceballos is a 39 y.o. female who presents with Skin Problem    Vitals:   Vitals:    10/04/21 1010 10/04/21 1012   BP: (!) 145/78    Pulse: 78    Resp: 20    Temp:  98.2 °F (36.8 °C)   TempSrc: Oral Oral   SpO2: 98%    Weight: (!) 303 lb (137.4 kg)    Height: 5' (1.524 m)      Impression:   1. Hidradenitis suppurativa    2. Breast abscess      I was personally available for consultation in the Emergency Department. I have reviewed the chart and agree with the documentation as recorded by the [facility], including the assessment, treatment plan and disposition.   Silvestre Paul MD  Attending Emergency  Physician                 Silvestre Paul MD  10/05/21 8931

## 2021-11-02 DIAGNOSIS — L73.2 HIDRADENITIS SUPPURATIVA: ICD-10-CM

## 2021-11-02 DIAGNOSIS — E11.9 TYPE 2 DIABETES MELLITUS WITHOUT COMPLICATION, WITHOUT LONG-TERM CURRENT USE OF INSULIN (HCC): ICD-10-CM

## 2021-11-02 RX ORDER — DOXYCYCLINE HYCLATE 100 MG/1
CAPSULE ORAL
Qty: 60 CAPSULE | Refills: 0 | Status: SHIPPED | OUTPATIENT
Start: 2021-11-02 | End: 2022-01-20

## 2021-11-02 NOTE — TELEPHONE ENCOUNTER
Refill request for Doxycycline and Metformin. If appropriate please send medication(s) to patients pharmacy. Next appt: Patient is currently on wait list for provider. Last appt: 9/24/2021    Health Maintenance   Topic Date Due    Hepatitis B vaccine (1 of 3 - Risk 3-dose series) 03/24/2022 (Originally 11/17/1996)    Pneumococcal 0-64 years Vaccine (1 of 2 - PPSV23) 03/24/2022 (Originally 11/17/1983)    Flu vaccine (1) 06/30/2022 (Originally 9/1/2021)    Diabetic foot exam  03/01/2022    Lipid screen  03/01/2022    Cervical cancer screen  04/07/2022    Diabetic retinal exam  07/06/2022    A1C test (Diabetic or Prediabetic)  09/24/2022    Diabetic microalbuminuria test  09/29/2022    DTaP/Tdap/Td vaccine (2 - Td or Tdap) 09/24/2031    COVID-19 Vaccine  Completed    Hepatitis C screen  Completed    HIV screen  Completed    Hepatitis A vaccine  Aged Out    Hib vaccine  Aged Out    Meningococcal (ACWY) vaccine  Aged Out       Hemoglobin A1C (%)   Date Value   09/24/2021 6.3   03/01/2021 6.4   07/24/2020 6.6 (H)             ( goal A1C is < 7)   Microalb/Crt.  Ratio (mcg/mg creat)   Date Value   09/29/2021 8     LDL Cholesterol (mg/dL)   Date Value   03/01/2021 81       (goal LDL is <100)   AST (U/L)   Date Value   05/11/2018 11     ALT (U/L)   Date Value   05/11/2021 12     BUN (mg/dL)   Date Value   07/12/2021 10     BP Readings from Last 3 Encounters:   10/04/21 (!) 145/78   09/24/21 131/88   09/07/21 118/80          (goal 120/80)          Patient Active Problem List:     Hyperlipidemia     Hypertension     Diabetes mellitus ([address]     GERD (gastroesophageal reflux disease)     Obesity, morbid, BMI 50 or higher (HCC)     Hidradenitis suppurativa     Sleep apnea     Perirectal abscess     Iron deficiency anemia     Dysmenorrhea     Menorrhagia     Acute cystitis without hematuria     Abnormal uterine bleeding (AUB)     Hysteroscopy, D&C, Myosure, Mirena IUD Insertion 11/18/19     Retained intrauterine contraceptive device (IUD)

## 2021-11-09 ENCOUNTER — OFFICE VISIT (OUTPATIENT)
Dept: OBGYN | Age: 46
End: 2021-11-09
Payer: COMMERCIAL

## 2021-11-09 VITALS
SYSTOLIC BLOOD PRESSURE: 135 MMHG | DIASTOLIC BLOOD PRESSURE: 85 MMHG | HEART RATE: 67 BPM | BODY MASS INDEX: 58.86 KG/M2 | WEIGHT: 293 LBS

## 2021-11-09 DIAGNOSIS — Z87.42 HISTORY OF ABNORMAL UTERINE BLEEDING: Primary | ICD-10-CM

## 2021-11-09 DIAGNOSIS — Z12.39 BREAST CANCER SCREENING, HIGH RISK PATIENT: ICD-10-CM

## 2021-11-09 DIAGNOSIS — K59.01 SLOW TRANSIT CONSTIPATION: ICD-10-CM

## 2021-11-09 PROCEDURE — 99213 OFFICE O/P EST LOW 20 MIN: CPT | Performed by: OBSTETRICS & GYNECOLOGY

## 2021-11-09 PROCEDURE — G8417 CALC BMI ABV UP PARAM F/U: HCPCS | Performed by: OBSTETRICS & GYNECOLOGY

## 2021-11-09 PROCEDURE — G8427 DOCREV CUR MEDS BY ELIG CLIN: HCPCS | Performed by: OBSTETRICS & GYNECOLOGY

## 2021-11-09 PROCEDURE — 4004F PT TOBACCO SCREEN RCVD TLK: CPT | Performed by: OBSTETRICS & GYNECOLOGY

## 2021-11-09 PROCEDURE — G8484 FLU IMMUNIZE NO ADMIN: HCPCS | Performed by: OBSTETRICS & GYNECOLOGY

## 2021-11-09 PROCEDURE — 99211 OFF/OP EST MAY X REQ PHY/QHP: CPT | Performed by: OBSTETRICS & GYNECOLOGY

## 2021-11-09 RX ORDER — DOCUSATE SODIUM 100 MG/1
100 CAPSULE, LIQUID FILLED ORAL 2 TIMES DAILY
Qty: 60 CAPSULE | Refills: 1 | Status: SHIPPED | OUTPATIENT
Start: 2021-11-09 | End: 2021-12-09

## 2021-11-09 NOTE — PROGRESS NOTES
Angela Sanchez  2021    YOB: 1975          The patient was seen today. She is here regarding follow up for pelvic pain and AUB S/P endometrial ablation 21. Avis Sanchez Her bowels are regular and she is voiding without difficulty. HPI:  Angela Sanchez is a 39 y.o. female [id number] the patient denies any vaginal bleeding since her last visit 21. There have been episodes of pelvic cramping but this has been related to constipation and most recently an episode of diarrhea. She has been using Gabapentin 600 mg HS that I had prescribed for pelvic pain . There pelvic pain has essentially resolved but she is finding the Gabapentin helps with back and muscle pain. The patient admits to taking both Naproxen and Motrin for pain even though the motrin was discontinued last month. She was strong advised to DC the motrin. S/S of gastric bleeding were reviewed and the pt is not having any of those. There is constipation at times and Rx for colace was refilled.        OB History    Para Term  AB Living   5 3 3 0 2 4   SAB IAB Ectopic Molar Multiple Live Births   1 1 0 0 1 4      # Outcome Date GA Lbr M[place]/2nd Weight Sex Delivery Anes PTL Lv   5 Term      Vag-Spont   ARNOLDO   4 SAB            3 Term 18    F Vag-Spont   ARNOLDO   2 IAB            1A Term      Vag-Spont   ARNOLDO   1B Term      Vag-Spont   ARNOLDO       Past Medical History:   Diagnosis Date    Abnormal uterine bleeding (AUB)     Anemia     Arthritis     KNEE AND BACK    Asthma     Diabetes mellitus (HCC)     GERD (gastroesophageal reflux disease)     Hidradenitis suppurativa     History of blood transfusion     Hyperlipidemia     Hypertension     Leg swelling     Obesity, morbid, BMI 50 or higher ([address]     Sleep apnea     needs new machine    Wears dentures     never wears them    Wellness examination 2021    [facility]-last visit 2021       Past Surgical History:   Procedure old     Social Determinants of Health     Financial Resource Strain: Low Risk     Difficulty of Paying Living Expenses: Not hard at all   Food Insecurity: No Food Insecurity    Worried About Running Out of Food in the Last Year: Never true    Abhijit of Food in the Last Year: Never true   Transportation Needs:     Lack of Transportation (Medical): Not on file    Lack of Transportation (Non-Medical):  Not on file   Physical Activity:     Days of Exercise per Week: Not on file    Minutes of Exercise per Session: Not on file   Stress:     Feeling of Stress : Not on file   Social Connections:     Frequency of Communication with Friends and Family: Not on file    Frequency of Social Gatherings with Friends and Family: Not on file    Attends Moravian Services: Not on file    Active Member of [address] [place] [address] [employer] or Organizations: Not on file    Attends Club or Organization Meetings: Not on file    Marital Status: Not on file   Intimate Partner Violence:     Fear of Current or Ex-Partner: Not on file    Emotionally Abused: Not on file    Physically Abused: Not on file    Sexually Abused: Not on file   Housing Stability:     Unable to Pay for Housing in the Last Year: Not on file    Number of [place] in the Last Year: Not on file    Unstable Housing in the Last Year: Not on file         MEDICATIONS:  Current Outpatient Medications on File Prior to Visit   Medication Sig Dispense Refill    doxycycline hyclate (VIBRAMYCIN) 100 MG capsule take 1 capsule by mouth twice a day with food 60 capsule 0    metFORMIN (GLUCOPHAGE) 500 MG tablet take 1 tablet by mouth once daily with breakfast 30 tablet 1    gabapentin (NEURONTIN) 300 MG capsule take 1 capsule by mouth once daily IF NO IMPROVEMENT AFTER 1-2 DAYS INCREASE TO 1 CAPSULE TIWCE DAILY as directed 60 capsule 1    tiZANidine (ZANAFLEX) 4 MG tablet Take 1 tablet by mouth every 8 hours as needed (back pain) 90 tablet 0    amLODIPine (NORVASC) 10 MG tablet Take 1 tablet by mouth daily 30 tablet 3    acetaminophen (ACETAMINOPHEN EXTRA STRENGTH) 500 MG tablet Take 1 tablet by mouth every 6 hours as needed for Pain 120 tablet 3    naproxen (NAPROSYN) 500 MG tablet Take 1 tablet by mouth 2 times daily as needed for Pain 60 tablet 1    benzoyl peroxide 5 % external liquid Wash face, chest and back 1-2 times daily 227 g 3    clindamycin (CLEOCIN T) 1 % lotion Apply to affected areas daily 60 mL 3     No current facility-administered medications on file prior to visit. ALLERGIES:  Allergies as of 11/09/2021    (No Known Allergies)       Blood pressure 135/85, pulse 67, weight (!) 301 lb 6.4 oz (136.7 kg), last menstrual period 09/07/2021, not currently breastfeeding. Lab Results:  Results for orders placed or performed during the hospital encounter of 09/29/21   Microalbumin, Ur   Result Value Ref Range    Microalb, Ur 28 (H) <21 mg/L    Creatinine, Ur 345.3 (H) 28.0 - 217.0 mg/dL    Microalb/Crt. Ratio 8 <25 mcg/mg creat   Creatinine, Random Urine   Result Value Ref Range    Creatinine, Ur 342.1 (H) 28.0 - 217.0 mg/dL         Assessment:   Diagnosis Orders   1. History of abnormal uterine bleeding     2. Slow transit constipation     3. Breast cancer screening, high risk patient  ESTEBAN DIGITAL SCREEN W OR WO CAD BILATERAL           PLAN:  Return in about 5 months (around 4/9/2022) for Annual exam and pap. 1. History of abnormal uterine bleeding, resolved following endometrial ablation. 2. Slow transit constipation  - refill colace     3. Breast cancer screening, high risk patient      - ESTEBAN DIGITAL SCREEN W OR WO CAD BILATERAL;  Future        Counseled on preventative health maintenance follow-up  Orders Placed This Encounter   Procedures    ESTEBAN DIGITAL SCREEN W OR WO CAD BILATERAL     Standing Status:   Future     Standing Expiration Date:   1/9/2023     Order Specific Question:   Reason for exam:     Answer:   screening       Patient was seen with total face to face time of 15 minutes. More than 50% of this visit was counseling and education regarding The primary encounter diagnosis was History of abnormal uterine bleeding. Diagnoses of Slow transit constipation and Breast cancer screening, high risk patient were also pertinent to this visit. and Follow-up (2 month fu)   as well as  counseling on preventative health maintenance follow-up.

## 2021-12-02 DIAGNOSIS — M54.50 CHRONIC BILATERAL LOW BACK PAIN WITHOUT SCIATICA: ICD-10-CM

## 2021-12-02 DIAGNOSIS — N94.6 PAINFUL MENSTRUATION: ICD-10-CM

## 2021-12-02 DIAGNOSIS — R10.2 FEMALE PELVIC PAIN: ICD-10-CM

## 2021-12-02 DIAGNOSIS — G89.29 CHRONIC BILATERAL LOW BACK PAIN WITHOUT SCIATICA: ICD-10-CM

## 2021-12-02 RX ORDER — TIZANIDINE 4 MG/1
TABLET ORAL
Qty: 90 TABLET | Refills: 0 | Status: SHIPPED | OUTPATIENT
Start: 2021-12-02 | End: 2022-03-29 | Stop reason: SDUPTHER

## 2021-12-02 RX ORDER — NAPROXEN 500 MG/1
TABLET ORAL
Qty: 60 TABLET | Refills: 1 | Status: SHIPPED | OUTPATIENT
Start: 2021-12-02 | End: 2022-03-29 | Stop reason: SDUPTHER

## 2021-12-02 NOTE — TELEPHONE ENCOUNTER
Request for Zanaflex. Next Visit Date:  No future appointments. Health Maintenance   Topic Date Due    Hepatitis B vaccine (1 of 3 - Risk 3-dose series) 03/24/2022 (Originally 11/17/1996)    Pneumococcal 0-64 years Vaccine (1 of 2 - PPSV23) 03/24/2022 (Originally 11/17/1983)    Flu vaccine (1) 06/30/2022 (Originally 9/1/2021)    Diabetic foot exam  03/01/2022    Lipid screen  03/01/2022    Cervical cancer screen  04/07/2022    Diabetic retinal exam  07/06/2022    A1C test (Diabetic or Prediabetic)  09/24/2022    Diabetic microalbuminuria test  09/29/2022    DTaP/Tdap/Td vaccine (2 - Td or Tdap) 09/24/2031    COVID-19 Vaccine  Completed    Hepatitis C screen  Completed    HIV screen  Completed    Hepatitis A vaccine  Aged Out    Hib vaccine  Aged Out    Meningococcal (ACWY) vaccine  Aged Out       Hemoglobin A1C (%)   Date Value   09/24/2021 6.3   03/01/2021 6.4   07/24/2020 6.6 (H)             ( goal A1C is < 7)   Microalb/Crt.  Ratio (mcg/mg creat)   Date Value   09/29/2021 8     LDL Cholesterol (mg/dL)   Date Value   03/01/2021 81       (goal LDL is <100)   AST (U/L)   Date Value   05/11/2018 11     ALT (U/L)   Date Value   05/11/2021 12     BUN (mg/dL)   Date Value   07/12/2021 10     BP Readings from Last 3 Encounters:   11/09/21 135/85   10/04/21 (!) 145/78   09/24/21 131/88          (goal 120/80)    All Future Testing planned in [facility]  Lab Frequency Next Occurrence   COVID-19 Once 06/01/2022   ESTEBAN DIGITAL SCREEN W OR WO CAD BILATERAL Once 01/28/2022         Patient Active Problem List:     Hyperlipidemia     Hypertension     Diabetes mellitus (HCC)     GERD (gastroesophageal reflux disease)     Obesity, morbid, BMI 50 or higher (HCC)     Hidradenitis suppurativa     Sleep apnea     Perirectal abscess     Iron deficiency anemia     Dysmenorrhea     Menorrhagia     Acute cystitis without hematuria     Abnormal uterine bleeding (AUB)     Hysteroscopy, D&C, Myosure, Mirena IUD Insertion 11/18/19     Retained intrauterine contraceptive device (IUD)

## 2022-01-20 DIAGNOSIS — L73.2 HIDRADENITIS SUPPURATIVA: ICD-10-CM

## 2022-01-20 NOTE — TELEPHONE ENCOUNTER
Request for Vibramycin. Next Visit Date:  Future Appointments   Date Time Provider Red Decker   1/25/2022  2:30 PM Fransisco Zimmerman MD [facility] [facility]   Topic Date Due    Hepatitis B vaccine (1 of 3 - Risk 3-dose series) 03/24/2022 (Originally 11/17/1996)    Pneumococcal 0-64 years Vaccine (1 of 2 - PPSV23) 03/24/2022 (Originally 11/17/1983)    Flu vaccine (1) 06/30/2022 (Originally 9/1/2021)    COVID-19 Vaccine (3 - Booster for Pfizer series) 02/17/2022    Diabetic foot exam  03/01/2022    Lipid screen  03/01/2022    Depression Screen  03/01/2022    Cervical cancer screen  04/07/2022    Diabetic retinal exam  07/06/2022    A1C test (Diabetic or Prediabetic)  09/24/2022    Diabetic microalbuminuria test  09/29/2022    DTaP/Tdap/Td vaccine (2 - Td or Tdap) 09/24/2031    Hepatitis C screen  Completed    HIV screen  Completed    Hepatitis A vaccine  Aged Out    Hib vaccine  Aged Out    Meningococcal (ACWY) vaccine  Aged Out       Hemoglobin A1C (%)   Date Value   09/24/2021 6.3   03/01/2021 6.4   07/24/2020 6.6 (H)             ( goal A1C is < 7)   Microalb/Crt.  Ratio (mcg/mg creat)   Date Value   09/29/2021 8     LDL Cholesterol (mg/dL)   Date Value   03/01/2021 81       (goal LDL is <100)   AST (U/L)   Date Value   05/11/2018 11     ALT (U/L)   Date Value   05/11/2021 12     BUN (mg/dL)   Date Value   07/12/2021 10     BP Readings from Last 3 Encounters:   11/09/21 135/85   10/04/21 (!) 145/78   09/24/21 131/88          (goal 120/80)    All Future Testing planned in [facility]  Lab Frequency Next Occurrence   COVID-19 Once 06/01/2022   ESTEBAN DIGITAL SCREEN W OR WO CAD BILATERAL Once 01/28/2022         Patient Active Problem List:     Hyperlipidemia     Hypertension     Diabetes mellitus (HCC)     GERD (gastroesophageal reflux disease)     Obesity, morbid, BMI 50 or higher (HCC)     Hidradenitis suppurativa     Sleep apnea     Perirectal abscess     Iron deficiency anemia Dysmenorrhea     Menorrhagia     Acute cystitis without hematuria     Abnormal uterine bleeding (AUB)     Hysteroscopy, D&C, Myosure, Mirena IUD Insertion 11/18/19     Retained intrauterine contraceptive device (IUD)

## 2022-01-23 RX ORDER — DOXYCYCLINE HYCLATE 100 MG/1
CAPSULE ORAL
Qty: 60 CAPSULE | Refills: 3 | Status: SHIPPED | OUTPATIENT
Start: 2022-01-23

## 2022-01-24 ENCOUNTER — TELEPHONE (OUTPATIENT)
Dept: INTERNAL MEDICINE | Age: 47
End: 2022-01-24

## 2022-01-24 NOTE — TELEPHONE ENCOUNTER
Writer called pt to confirm her appt for tomorrow and to chance her appt to virtual instead of ov.  thanks

## 2022-01-25 NOTE — TELEPHONE ENCOUNTER
Request for medication refill aspirin and nasal spray      Next Visit Date:3/29/22  Future Appointments   Date Time Provider Red Brianne   1/25/2022  2:30 PM Mago Nava MD [id number]   3/29/2022  3:30 PM Mago Nava MD [facility]   Topic Date Due    COVID-19 Vaccine (3 - Booster for [employer] series) 01/17/2022    Hepatitis B vaccine (1 of 3 - Risk 3-dose series) 03/24/2022 (Originally 11/17/1996)    Pneumococcal 0-64 years Vaccine (1 of 2 - PPSV23) 03/24/2022 (Originally 11/17/1983)    Flu vaccine (1) 06/30/2022 (Originally 9/1/2021)    Diabetic foot exam  03/01/2022    Lipid screen  03/01/2022    Depression Screen  03/01/2022    Cervical cancer screen  04/07/2022    Diabetic retinal exam  07/06/2022    A1C test (Diabetic or Prediabetic)  09/24/2022    Diabetic microalbuminuria test  09/29/2022    DTaP/Tdap/Td vaccine (2 - Td or Tdap) 09/24/2031    Hepatitis C screen  Completed    HIV screen  Completed    Hepatitis A vaccine  Aged Out    Hib vaccine  Aged Out    Meningococcal (ACWY) vaccine  Aged Out       Hemoglobin A1C (%)   Date Value   09/24/2021 6.3   03/01/2021 6.4   07/24/2020 6.6 (H)             ( goal A1C is < 7)   Microalb/Crt.  Ratio (mcg/mg creat)   Date Value   09/29/2021 8     LDL Cholesterol (mg/dL)   Date Value   03/01/2021 81       (goal LDL is <100)   AST (U/L)   Date Value   05/11/2018 11     ALT (U/L)   Date Value   05/11/2021 12     BUN (mg/dL)   Date Value   07/12/2021 10     BP Readings from Last 3 Encounters:   11/09/21 135/85   10/04/21 (!) 145/78   09/24/21 131/88          (goal 120/80)    All Future Testing planned in [facility]  Lab Frequency Next Occurrence   COVID-19 Once 06/01/2022   ESTEBAN DIGITAL SCREEN W OR WO CAD BILATERAL Once 01/28/2022         Patient Active Problem List:     Hyperlipidemia     Hypertension     Diabetes mellitus ([facility] [address]     GERD (gastroesophageal reflux disease)     Obesity, morbid, BMI 50 or higher ([facility] Hidradenitis suppurativa     Sleep apnea     Perirectal abscess     Iron deficiency anemia     Dysmenorrhea     Menorrhagia     Acute cystitis without hematuria     Abnormal uterine bleeding (AUB)     Hysteroscopy, D&C, Myosure, Mirena IUD Insertion 11/18/19     Retained intrauterine contraceptive device (IUD)

## 2022-01-26 RX ORDER — ACETAMINOPHEN 500 MG
500 TABLET ORAL EVERY 6 HOURS PRN
Qty: 120 TABLET | Refills: 3 | Status: SHIPPED | OUTPATIENT
Start: 2022-01-26 | End: 2022-03-31 | Stop reason: SDUPTHER

## 2022-03-08 DIAGNOSIS — M17.12 ARTHRITIS OF LEFT KNEE: Primary | ICD-10-CM

## 2022-03-09 ENCOUNTER — OFFICE VISIT (OUTPATIENT)
Dept: ORTHOPEDIC SURGERY | Age: 47
End: 2022-03-09
Payer: COMMERCIAL

## 2022-03-09 VITALS — HEIGHT: 60 IN | BODY MASS INDEX: 57.52 KG/M2 | WEIGHT: 293 LBS

## 2022-03-09 DIAGNOSIS — E66.01 CLASS 3 SEVERE OBESITY WITH BODY MASS INDEX (BMI) OF 50.0 TO 59.9 IN ADULT, UNSPECIFIED OBESITY TYPE, UNSPECIFIED WHETHER SERIOUS COMORBIDITY PRESENT (HCC): ICD-10-CM

## 2022-03-09 DIAGNOSIS — M17.12 ARTHRITIS OF LEFT KNEE: Primary | ICD-10-CM

## 2022-03-09 PROCEDURE — G8417 CALC BMI ABV UP PARAM F/U: HCPCS | Performed by: PHYSICIAN ASSISTANT

## 2022-03-09 PROCEDURE — 20610 DRAIN/INJ JOINT/BURSA W/O US: CPT | Performed by: PHYSICIAN ASSISTANT

## 2022-03-09 PROCEDURE — G8484 FLU IMMUNIZE NO ADMIN: HCPCS | Performed by: PHYSICIAN ASSISTANT

## 2022-03-09 PROCEDURE — 4004F PT TOBACCO SCREEN RCVD TLK: CPT | Performed by: PHYSICIAN ASSISTANT

## 2022-03-09 PROCEDURE — G8427 DOCREV CUR MEDS BY ELIG CLIN: HCPCS | Performed by: PHYSICIAN ASSISTANT

## 2022-03-09 ASSESSMENT — ENCOUNTER SYMPTOMS
COLOR CHANGE: 0
VOMITING: 0
COUGH: 0
SHORTNESS OF BREATH: 0

## 2022-03-09 NOTE — PROGRESS NOTES
[address]  [address]  Dept: [phone]  Dept Fax: [phone]        Ambulatory Follow Up      Subjective:   Yesenia Good is a 42y.o. year old female who presents to our office today for routine followup regarding her   1. Arthritis of left knee    2. Class 3 severe obesity with body mass index (BMI) of 50.0 to 59.9 in adult, unspecified obesity type, unspecified whether serious comorbidity present [facility])        Chief Complaint   Patient presents with    Follow-up     L knee pain        HPI Yesenia Good  is a 42 y.o.  female who presents today in follow for left knee arthritis. The patient was last seen on 9/21/2021  and underwent treatment in the form of left knee corticosteroid injection, home exercise program and oral anti-inflammatories. The patient notes 100% improvement with the previous treatment that lasted for approximately 6 weeks. The patient notes that her pain gradually increased over the last 2 to 3 months. 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 Patient's current BMI is 59.76 (306 pounds). She notes that she is trying to lose weight but her left knee is bothering her therefore she has been more sedentary than normal.         Patients most recent HgbA1c was 6.3 on 9/24/2021. Patient is a current smoker who smokes approximately half a pack per day. Review of Systems   Constitutional: Negative for activity change and fever. HENT: Negative for sneezing. Respiratory: Negative for cough and shortness of breath. Cardiovascular: Negative for chest pain. Gastrointestinal: Negative for vomiting. Musculoskeletal: Positive for arthralgias (Left knee). Negative for joint swelling and myalgias. Skin: Negative for color change. Neurological: Negative for weakness and numbness.    Psychiatric/Behavioral: Negative for sleep disturbance. Objective :   Ht 5' (1.524 m)   Wt (!) 306 lb (138.8 kg)   BMI 59.76 kg/m²  Body mass index is 59.76 kg/m². General: Melani Isaacs is a 42 y.o. female who is alert and oriented and sitting comfortably in our office. Ortho Exam  MS:  Evaluation of the Left knee reveals no significant outward deformity. There is no erythema, skin warmth, skin lesions, or signs of infection appreciated. There is tenderness over the medial joint line with palpation. There is a mild knee effusion. Range of motion of the Left knee is  5-100. No instability of the knee is appreciated at 0 and 30° of flexion. There is a negative anterior drawer and Lachman's test.  There is increased pain with varus Melvin's testing. No calf tenderness is noted. There is a negative hip log roll and Stinchfield test on the Left. Motor, sensory, vascular examination to the Left lower extremity is intact. Patient has full range of motion of the Left ankle. Neuro: alert and oriented to person and place. Eyes: Extra-ocular muscles intact  Mouth: Oral mucosa moist. No perioral lesions  Pulm: Respirations unlabored and regular. Symmetric chest excursion without outward deformity is noted. Skin: warm, well perfused  Psych:   Patient has good fund of knowledge and displays understanging of exam, diagnosis, and plan. Radiology:     XR KNEE LEFT (MIN 4 VIEWS)    Result Date: 3/9/2022  History:   Left knee pain. Comparison: 3/15/2021. Findings:   Standing AP/Lateral/Tunnel/Merchant view xrays of the Left knee done in the office today shows moderate  medial joint space narrowing, tricompartmental osteophytosis, joint line sclerosis medially. No evidence of fracture, subluxation, dislocation, radioopaque foreign body/tumor is noted. Lateral subluxation of the tibia is appreciated. Mild increase in medial compartment degenerative changes when compared to images on 3/15/2021.  Impression:   Left knee moderate  degenerative changes as described above. Procedure:  KNEE INJECTION PROCEDURE NOTE:  The patient was identified. Verbal consent was obtained to proceed with a Left  knee injection. The Left knee was confirmed with the patient. After a sterile prep with Betadine the knee was injected using a lateral joint line approach with a mixture of  2mL of 0.25% Marcaine and 80 mg of Depo-Medrol. Patient tolerated the procedure well without post injection complications. I instructed the patient to call our office immediately if they have any swelling or increased pain at the injection site. Assessment:      1. Arthritis of left knee    2. Class 3 severe obesity with body mass index (BMI) of 50.0 to 59.9 in adult, unspecified obesity type, unspecified whether serious comorbidity present [facility])       Plan: Today in office we discussed etiology and natural history of left knee pain due to osteoarthritis. I personally reviewed the patient's x-rays from today revealing moderate degenerative changes primarily within the medial compartment. The treatment options may include activity modification, oral anti-inflammatories, bracing, injections, advanced imaging, physical therapy and/or surgical intervention. Due to the patient's current BMI 59.76 (306 pounds) we are unable to discuss surgical intervention for the patient's left knee discomfort due to increased risk for wound healing complications and possible infection if we were to do a left total knee arthroplasty. The patient would like to proceed with:  1. Left knee corticosteroid injection. The patient was given the injection today in office. She tolerated the procedure without complication. 2.  Referral to weight management/bariatrics to discuss weight loss techniques. He had a very long discussion today in office about healthy eating habits, increase in exercise and smoking cessation to help with her weight loss.   Patient states that she is going to make a conscious effort. 3.  Continue naproxen as needed for left lower extremity discomfort. The patient will follow up in 3 months, or sooner if needed. We discussed that the patient should call us with any questions or concerns. The patient voiced her understanding. Follow up:Return in about 3 months (around 6/9/2022) for re-evaluation. Orders Placed This Encounter   Medications    methylPREDNISolone acetate (DEPO-MEDROL) injection 80 mg    bupivacaine (MARCAINE) 0.25 % injection 5 mg         Orders Placed This Encounter   Procedures   [facility], Weight Management and [address] [place] [address]     Referral Priority:   Routine     Referral Type:   Eval and Treat     Referral Reason:   Specialty Services Required     Referred to Provider:   Sindy Salomon DO     Requested Specialty:   Bariatric Surgery     Number of Visits Requested:   1  [id number] - DRAIN/INJECT [facility]       This note is created with the assistance of a speech recognition program.  While intending to generate a document that actually reflects the content of the visit, the document can still have some errors including those of syntax and sound a like substitutions which may escape proof reading. In such instances, actual meaning can be extrapolated by contextual diversion.      Electronically signed by Elder Trejo PA-C on 3/23/2022 at 10:10 AM

## 2022-03-23 RX ORDER — METHYLPREDNISOLONE ACETATE 80 MG/ML
80 INJECTION, SUSPENSION INTRA-ARTICULAR; INTRALESIONAL; INTRAMUSCULAR; SOFT TISSUE ONCE
Status: COMPLETED | OUTPATIENT
Start: 2022-03-23 | End: 2022-03-23

## 2022-03-23 RX ORDER — BUPIVACAINE HYDROCHLORIDE 2.5 MG/ML
2 INJECTION, SOLUTION INFILTRATION; PERINEURAL ONCE
Status: COMPLETED | OUTPATIENT
Start: 2022-03-23 | End: 2022-03-23

## 2022-03-23 RX ADMIN — METHYLPREDNISOLONE ACETATE 80 MG: 80 INJECTION, SUSPENSION INTRA-ARTICULAR; INTRALESIONAL; INTRAMUSCULAR; SOFT TISSUE at 10:00

## 2022-03-23 RX ADMIN — BUPIVACAINE HYDROCHLORIDE 5 MG: 2.5 INJECTION, SOLUTION INFILTRATION; PERINEURAL at 09:59

## 2022-03-29 DIAGNOSIS — R10.2 FEMALE PELVIC PAIN: ICD-10-CM

## 2022-03-29 DIAGNOSIS — M54.50 CHRONIC BILATERAL LOW BACK PAIN WITHOUT SCIATICA: ICD-10-CM

## 2022-03-29 DIAGNOSIS — E11.9 TYPE 2 DIABETES MELLITUS WITHOUT COMPLICATION, WITHOUT LONG-TERM CURRENT USE OF INSULIN (HCC): ICD-10-CM

## 2022-03-29 DIAGNOSIS — N94.6 PAINFUL MENSTRUATION: ICD-10-CM

## 2022-03-29 DIAGNOSIS — I10 ESSENTIAL HYPERTENSION: ICD-10-CM

## 2022-03-29 DIAGNOSIS — G89.29 OTHER CHRONIC PAIN: Primary | ICD-10-CM

## 2022-03-29 DIAGNOSIS — G89.29 CHRONIC BILATERAL LOW BACK PAIN WITHOUT SCIATICA: ICD-10-CM

## 2022-03-29 RX ORDER — GABAPENTIN 300 MG/1
CAPSULE ORAL
Qty: 60 CAPSULE | Refills: 1 | Status: CANCELLED | OUTPATIENT
Start: 2022-03-29 | End: 2022-04-28

## 2022-03-29 RX ORDER — GABAPENTIN 300 MG/1
300 CAPSULE ORAL 2 TIMES DAILY
Qty: 60 CAPSULE | Refills: 3 | Status: SHIPPED | OUTPATIENT
Start: 2022-03-29 | End: 2022-03-31

## 2022-03-29 RX ORDER — AMLODIPINE BESYLATE 10 MG/1
10 TABLET ORAL DAILY
Qty: 30 TABLET | Refills: 3 | OUTPATIENT
Start: 2022-03-29

## 2022-03-29 NOTE — TELEPHONE ENCOUNTER
Multiple meds pended  Pt has future appointment          Next Visit Date:  Future Appointments   Date Time Provider Red Brianne   5/17/2022  2:30 PM Aleena Thornton MD [facility]   Topic Date Due    Pneumococcal 0-64 years Vaccine (1 of 2 - PPSV23) Never done    Hepatitis B vaccine (1 of 3 - Risk 3-dose series) Never done    COVID-19 Vaccine (3 - Booster for Pfizer series) 01/17/2022    Diabetic foot exam  03/01/2022    Lipid screen  03/01/2022    Depression Screen  03/01/2022    Cervical cancer screen  04/07/2022    Flu vaccine (1) 06/30/2022 (Originally 9/1/2021)    Diabetic retinal exam  07/06/2022    A1C test (Diabetic or Prediabetic)  09/24/2022    Diabetic microalbuminuria test  09/29/2022    DTaP/Tdap/Td vaccine (2 - Td or Tdap) 09/24/2031    Hepatitis C screen  Completed    HIV screen  Completed    Hepatitis A vaccine  Aged Out    Hib vaccine  Aged Out    Meningococcal (ACWY) vaccine  Aged Out       Hemoglobin A1C (%)   Date Value   09/24/2021 6.3   03/01/2021 6.4   07/24/2020 6.6 (H)             ( goal A1C is < 7)   Microalb/Crt.  Ratio (mcg/mg creat)   Date Value   09/29/2021 8     LDL Cholesterol (mg/dL)   Date Value   03/01/2021 81   09/11/2019 81       (goal LDL is <100)   AST (U/L)   Date Value   05/11/2018 11     ALT (U/L)   Date Value   05/11/2021 12     BUN (mg/dL)   Date Value   07/12/2021 10     BP Readings from Last 3 Encounters:   11/09/21 135/85   10/04/21 (!) 145/78   09/24/21 131/88          (goal 120/80)    All Future Testing planned in [facility]  Lab Frequency Next Occurrence   COVID-19 Once 06/01/2022   ESTEBAN DIGITAL SCREEN W OR WO CAD BILATERAL Once 11/09/2022               Patient Active Problem List:     Hyperlipidemia     Hypertension     Diabetes mellitus ([address]     GERD (gastroesophageal reflux disease)     Obesity, morbid, BMI 50 or higher (HCC)     Hidradenitis suppurativa     Sleep apnea     Perirectal abscess     Iron deficiency anemia     Dysmenorrhea     Menorrhagia     Acute cystitis without hematuria     Abnormal uterine bleeding (AUB)     Hysteroscopy, D&C, Myosure, Mirena IUD Insertion 11/18/19     Retained intrauterine contraceptive device (IUD)

## 2022-03-30 RX ORDER — NAPROXEN 500 MG/1
TABLET ORAL
Qty: 60 TABLET | Refills: 1 | OUTPATIENT
Start: 2022-03-30

## 2022-03-31 ENCOUNTER — OFFICE VISIT (OUTPATIENT)
Dept: INTERNAL MEDICINE | Age: 47
End: 2022-03-31
Payer: COMMERCIAL

## 2022-03-31 VITALS
HEIGHT: 60 IN | TEMPERATURE: 97.2 F | WEIGHT: 293 LBS | BODY MASS INDEX: 57.52 KG/M2 | OXYGEN SATURATION: 98 % | SYSTOLIC BLOOD PRESSURE: 127 MMHG | HEART RATE: 83 BPM | DIASTOLIC BLOOD PRESSURE: 58 MMHG

## 2022-03-31 DIAGNOSIS — G89.29 OTHER CHRONIC PAIN: ICD-10-CM

## 2022-03-31 DIAGNOSIS — M17.12 LOCALIZED OSTEOARTHRITIS OF LEFT KNEE: Primary | ICD-10-CM

## 2022-03-31 DIAGNOSIS — I10 HYPERTENSION, UNSPECIFIED TYPE: Chronic | ICD-10-CM

## 2022-03-31 DIAGNOSIS — E66.01 OBESITY, MORBID, BMI 50 OR HIGHER (HCC): Chronic | ICD-10-CM

## 2022-03-31 DIAGNOSIS — Z13.31 POSITIVE DEPRESSION SCREENING: ICD-10-CM

## 2022-03-31 PROCEDURE — 99213 OFFICE O/P EST LOW 20 MIN: CPT | Performed by: STUDENT IN AN ORGANIZED HEALTH CARE EDUCATION/TRAINING PROGRAM

## 2022-03-31 PROCEDURE — G8417 CALC BMI ABV UP PARAM F/U: HCPCS | Performed by: STUDENT IN AN ORGANIZED HEALTH CARE EDUCATION/TRAINING PROGRAM

## 2022-03-31 PROCEDURE — 4004F PT TOBACCO SCREEN RCVD TLK: CPT | Performed by: STUDENT IN AN ORGANIZED HEALTH CARE EDUCATION/TRAINING PROGRAM

## 2022-03-31 PROCEDURE — G8427 DOCREV CUR MEDS BY ELIG CLIN: HCPCS | Performed by: STUDENT IN AN ORGANIZED HEALTH CARE EDUCATION/TRAINING PROGRAM

## 2022-03-31 PROCEDURE — G8484 FLU IMMUNIZE NO ADMIN: HCPCS | Performed by: STUDENT IN AN ORGANIZED HEALTH CARE EDUCATION/TRAINING PROGRAM

## 2022-03-31 RX ORDER — TIZANIDINE 4 MG/1
4 TABLET ORAL EVERY 8 HOURS PRN
Qty: 90 TABLET | Refills: 0 | Status: SHIPPED | OUTPATIENT
Start: 2022-03-31 | End: 2022-08-16 | Stop reason: SDUPTHER

## 2022-03-31 RX ORDER — NAPROXEN 500 MG/1
500 TABLET ORAL 2 TIMES DAILY PRN
Qty: 60 TABLET | Refills: 0 | Status: SHIPPED | OUTPATIENT
Start: 2022-03-31 | End: 2022-08-16 | Stop reason: SDUPTHER

## 2022-03-31 RX ORDER — GABAPENTIN 300 MG/1
300 CAPSULE ORAL 3 TIMES DAILY
Qty: 90 CAPSULE | Refills: 0 | Status: SHIPPED | OUTPATIENT
Start: 2022-04-28 | End: 2022-06-02 | Stop reason: SDUPTHER

## 2022-03-31 RX ORDER — ACETAMINOPHEN 500 MG
500 TABLET ORAL EVERY 6 HOURS PRN
Qty: 120 TABLET | Refills: 3 | Status: SHIPPED | OUTPATIENT
Start: 2022-03-31

## 2022-03-31 RX ORDER — AMLODIPINE BESYLATE 10 MG/1
10 TABLET ORAL DAILY
Qty: 30 TABLET | Refills: 3 | Status: SHIPPED | OUTPATIENT
Start: 2022-03-31 | End: 2022-08-16 | Stop reason: SDUPTHER

## 2022-03-31 ASSESSMENT — PATIENT HEALTH QUESTIONNAIRE - PHQ9
SUM OF ALL RESPONSES TO PHQ QUESTIONS 1-9: 18
4. FEELING TIRED OR HAVING LITTLE ENERGY: 0
7. TROUBLE CONCENTRATING ON THINGS, SUCH AS READING THE NEWSPAPER OR WATCHING TELEVISION: 0
SUM OF ALL RESPONSES TO PHQ9 QUESTIONS 1 & 2: 6
10. IF YOU CHECKED OFF ANY PROBLEMS, HOW DIFFICULT HAVE THESE PROBLEMS MADE IT FOR YOU TO DO YOUR WORK, TAKE CARE OF THINGS AT HOME, OR GET ALONG WITH OTHER PEOPLE: 3
SUM OF ALL RESPONSES TO PHQ QUESTIONS 1-9: 18
6. FEELING BAD ABOUT YOURSELF - OR THAT YOU ARE A FAILURE OR HAVE LET YOURSELF OR YOUR FAMILY DOWN: 3
5. POOR APPETITE OR OVEREATING: 3
3. TROUBLE FALLING OR STAYING ASLEEP: 3
SUM OF ALL RESPONSES TO PHQ QUESTIONS 1-9: 18
1. LITTLE INTEREST OR PLEASURE IN DOING THINGS: 3
9. THOUGHTS THAT YOU WOULD BE BETTER OFF DEAD, OR OF HURTING YOURSELF: 0
8. MOVING OR SPEAKING SO SLOWLY THAT OTHER PEOPLE COULD HAVE NOTICED. OR THE OPPOSITE, BEING SO FIGETY OR RESTLESS THAT YOU HAVE BEEN MOVING AROUND A LOT MORE THAN USUAL: 3
SUM OF ALL RESPONSES TO PHQ QUESTIONS 1-9: 18
2. FEELING DOWN, DEPRESSED OR HOPELESS: 3

## 2022-03-31 NOTE — PROGRESS NOTES
HOUSING ASSISTANCE      RESOURCE SERVICE PHONE WEB   Giovany Thuuz 2-1-1 Emergency Shelter Clearing House [phone]  [address] www.[facility] [email or web address]/211    [employer] 2184 [facility] for Disaster Victims [phone]  Service-Intake www.graceclementinaohio. [address] [place] [address] Housing/Shelter for Women [phone]  Service-Intake www. [place] for Men & Women [phone] 44045 [facility]. [address] [place] [address] for Families, Pregnant Women [phone] ext: [facility] HospitalJambool. org    Evangelical Charities Ton of [employer] of Tapestry for Families, Pregnant Women [phone]) [phone]  Service-Intake www.ANT FarmriCore SolutionsnPLUMgrido. 793 [facility] [phone] 2309 [facility] for [facility] & Women ([phone]) [phone] www. [facility] Hospitalission.  Cindy Llanos [phone]  Service-Intake N/A   TASC of [place] 6558 for Ex-Offenders (8) [phone] of [place] 6558 for Women (263) [phone]  1430 [facility] Rape Crisis Hotline www.bfinance UKcanVestar Capital Partners. [address] [place] [address] for Individuals with AIDS/HIV, Rent Payment Assistance for individuals with AIDS/HIV (614) 458-0952 ext: 6081 [facility]d. [facility] Specialty CenterVenaxis. org    Volunteers of 2100 [facility] Ex-Offender [employer] ([phone]) [phone]  Administrative www.[facility]. [place] [address] ([phone]) [phone]  Service-Intake GetixUniversity Hospitals Geauga Medical Center51hejia.comNorth Knoxville Medical CenterProenza Schouer    [address] [place] [address] for Families, 59 [facility] for Pregnant Women 2-1-1 www.[facility]. [address] for Youth ([phone]) [phone]  Main Number Taulia. [place].  [address] Mortgage/Rent [employer] for [employer] and their families 9110 8646194 http://co.jerardo. oh./   Ovarian Cancer Connection Rent Payment Assistance for Individuals with Ovarian Cancer    (693) 106-6433  Andrew. Frandy Bonner www. ovarianconnection. [facility] 92 (401) [phone]  Administrative Fusion-iosaint-pius. 9 [place] Pendo Systems for Individuals with Multiple Sclerosis 99 [phone] ext: 1  Toll Free [facility] CenterciMount Vernon Hospital.org/Chapters/OHA    54 [facility] & Medical Centertiss Pendo Systems [phone]  Andrew. Frandy Bonner - [employer]. KMEST714. org    Neighborhood Properties Homeless Permanent Support (359) [phone]  Service-Intake www.neighborhoodproperties. org     1/30/20

## 2022-03-31 NOTE — PATIENT INSTRUCTIONS
Referral to Behavioral Health was placed, summary of care printed and faxed to office, phone numbers given to the patient, they will contact office for an appt    Medications e-scribe to pharmacy of pt's choice. Letter given to patient. Return To Clinic 5/17/2022. After Visit Summary  given and reviewed. --    It is very important for your care that you keep your appointment. If for some reason you are unable to keep your appointment it is equally important that you call our office at [phone] to cancel your appointment and reschedule. Failure to do so may result in your termination from our practice.

## 2022-03-31 NOTE — LETTER
IGNACIO Almanza [facility] 41  4829 [facility] 93 [address]  Phone: [phone]  Fax: [phone]    Dennis Hutson MD        March 31, 2022     Patient: Sunitha Owens   YOB: 1975   Date of Visit: 3/31/2022       To Whom It May Concern: It is my medical opinion that Keegan Bhandari has severe pain from arhtitis in her left knee and should restrict from work that requires prolonged standing. Needs break from standing every 2 hours. Cannot lift more than 15 lbs. Avoid bending. If you have any questions or concerns, please don't hesitate to call.     Sincerely,        Dennis Hutson MD

## 2022-03-31 NOTE — PROGRESS NOTES
Attending Physician Statement  I have discussed the care of Arie Quinn, including pertinent history and exam findings,  with the resident. I have reviewed the key elements of all parts of the encounter with the resident. I agree with the assessment, plan and orders as documented by the resident.   (GE Modifier)

## 2022-03-31 NOTE — PROGRESS NOTES
FOOD RESOURCES      RESOURCE SERVICE PHONE [employer]   Outreach of Adelia Caballero 442 [phone]  Administrative www.iccatdarby. org   Mustard [employer] [phone]  AndrewReynold Frandy 47 - and Fax N/A   [facility] [phone] 4700 Lady Alexia Zuniga [phone] [address] / Darrell Flores [phone]  [place] 2368 Assistance Programs ([phone]  Dorcus Luria. [address] Feed Your Neighbor ([phone]  Cindy Nino 178 [phone]  [address] [phone] [address] [phone]  Service-Intake www.pallaviationarmynwohfilomena. 5353 [facility] Emergency Food Pantry ([phone]  Service-Intake www.[facility] Hospitalo. [facility] P[facility]lerendira U. 91. (174) [phone]  Service-Intake www.stpaulscommunitycenter. [address] [place] [address] [phone]) [phone]  Administrative www. PeaceHealthgalindoFoothills Hospitalhcrist. [address] Bank Back Pack Program (685) [phone]  Kendall Rodrigez. [address] Meals Program [phone] 450 [facility] and Shelter for men www.aleidaMarion HospitalcuemisSelect Specialty Hospital-Pontiac. [facility] for Social and 351 [facility] [phone] www. providemicaelantertoledo. [address] [place] [address] [phone]) [phone]  [facility]High[facility] Hospital Ellen Kimble 187. com    Yazidism Women [employer] Emergency Assistance [phone] [address] Feed Your Neighbor [phone] Administrative N/A   [facility] 65 22 [phone]  [address] / [place] [phone] 112 [facility] [phone] 300 [facility] Pantry / Meals [phone]) [phone]  [address] Fr[address]  www. [facility]. [facility] 50 Pantry / [employer] [phone]  Administrative www. sandrine. [facility] [phone]  [address] of 15[address] W [facility] / [place] Lunger [phone]  1840 Wealthy  Se Pantry and Grooming Supplies [phone]  [address]. Fr[address]  http://[email or web address]    300 [place] [phone]  1300 [facility] PASSAVANT-CRANBERRY-ER Positive Living Program [phone] ext: 45 6303 [facility] www. Corefino. [employer]    Food for [address] 66-30-72-19 www. feedtoledo. Vandana 50 Pantry ([phone]) [phone]  [address] Assembly of [address] [phone] http://www. AliveshoestheCvention. DIREVO [employer]    777 [facility] [phone]  227 [facility] Family Pantry and [place] [phone] nightingales-harvest.org    201 [facility] [phone]  Ul. [facility] HospitaleddWaverly [facility] www. [email or web address].XBZ    [facility] [phone]  Administrative factoledo. [address] [place], [place] [address]chetan Swann,15Th Floor [phone]  Toll Free www. Endorphin    Body of 3 [facility] for [place] 1 Pantry (457) 110 [facility] 372 5730 www. [facility] General HospitalemKaiser Permanente Medical Center. Latest Medical     Living [address] [phone] [address] Morning Blessings [phone]  Administrative N/A   [facility]  of Reg[facility] Meridian3 [facility] Feed Your Neighbor [phone] 100 [facility] for the [facility] Food Pantry [phone] 851 [facility]. [facility]/   [facility] of [facility]iatric [facility] 30 Pantry  [phone] Nando Novak 13 Emergency Food and Hygiene Pantry [phone]  Administrative www. friendlyMartins Ferry Hospital. [facility] [address] [phone]  2000 [facility] [phone]  [address] tv    Helping Hands of [address] / [place] / Ann Jordan [phone]  [address] [place] [address]. [address] Pantry [facility] [phone]  [place] [address] [phone]  [address] [place] [address]  [phone]  Press About Us.pt    125 [facility] Pantry [phone] [email or web address].org/   400 [facility] Pantry  [phone] N/A   Food for [address] Pantry [phone] N/A     Updated 1/30/20

## 2022-03-31 NOTE — PROGRESS NOTES
[facility] 1205 [address] [address]  Dept: [phone]  Dept Fax: [phone]    Office Progress/Follow Up Note  Date of patient's visit: 3/31/2022  Patient's Name:  Juan Carlos Srivastava YOB: 1975            Patient Care Team:  Javon Smith MD as PCP - General (Internal Medicine)    REASON FOR VISIT: Same-day visit    HISTORY OF PRESENT ILLNESS:      Chief Complaint   Patient presents with    Letter to Patient     wants a letter that she can't work, or stand for long periods of time.  Health Maintenance     labs pended,        History was obtained from the patient. Juan Carlos Srivastava is a 42 y.o. is here for a same-day visit for left knee pain. Patient has history of longstanding left knee osteoarthritis. Patient follows orthopedic surgery and receiving steroid injections into the left knee, last one on 3/9/2022. She is morbidly obese with BMI 59.57. States that she is in severe pain, 9/10 in intensity. Uses multiple lidocaine patches. Patient is in severe distress. Crying during the encounter. PHQ-9 score is 18 indicating moderately severe depression. Denies any suicidal ideation. Does not want to start on any antidepressants at this time. She has an appointment with bariatric surgery later this month.       Patient Active Problem List   Diagnosis    Hyperlipidemia    Hypertension    Diabetes mellitus ([address]    GERD (gastroesophageal reflux disease)    Obesity, morbid, BMI 50 or higher (HCC)    Hidradenitis suppurativa    Sleep apnea    Perirectal abscess    Iron deficiency anemia    Dysmenorrhea    Menorrhagia    Acute cystitis without hematuria    Abnormal uterine bleeding (AUB)    Hysteroscopy, D&C, Myosure, Mirena IUD Insertion 11/18/19    Retained intrauterine contraceptive device (IUD)    Localized osteoarthritis of left knee         Health Maintenance Due   Topic Date Due    Pneumococcal 0-64 years Vaccine (1 of 2 - PPSV23) Never done    Hepatitis B vaccine (1 of 3 - Risk 3-dose series) Never done    COVID-19 Vaccine (3 - Booster for Pfizer series) 01/17/2022    Diabetic foot exam  03/01/2022    Lipid screen  03/01/2022    Depression Monitoring  03/01/2022    Cervical cancer screen  04/07/2022         No Known Allergies      MEDICATIONS:      Current Outpatient Medications   Medication Sig Dispense Refill    gabapentin (NEURONTIN) 300 MG capsule Take 1 capsule by mouth 2 times daily for 30 days. Take 1 capsule by mouth twice daily 60 capsule 3    metFORMIN (GLUCOPHAGE) 500 MG tablet take 1 tablet by mouth once daily with breakfast 60 tablet 3    acetaminophen (ACETAMINOPHEN EXTRA STRENGTH) 500 MG tablet Take 1 tablet by mouth every 6 hours as needed for Pain 120 tablet 3    doxycycline hyclate (VIBRAMYCIN) 100 MG capsule take 1 capsule by mouth twice a day with food 60 capsule 3    tiZANidine (ZANAFLEX) 4 MG tablet take 1 tablet by mouth every 8 hours AS NEEDED FOR PAIN 90 tablet 0    naproxen (NAPROSYN) 500 MG tablet take 1 tablet by mouth twice a day if needed for pain 60 tablet 1    amLODIPine (NORVASC) 10 MG tablet Take 1 tablet by mouth daily 30 tablet 3    benzoyl peroxide 5 % external liquid Wash face, chest and back 1-2 times daily 227 g 3    clindamycin (CLEOCIN T) 1 % lotion Apply to affected areas daily 60 mL 3     No current facility-administered medications for this visit. SOCIAL HISTORY    Reviewed and no change from previous record. Xuan Zelaya  reports that she has been smoking cigarettes. She has a 15.00 pack-year smoking history. She has never used smokeless tobacco.    FAMILY HISTORY:    Reviewed and No change from previous visit    REVIEW OF SYSTEMS:    12 point ROS Negative except as mentioned above.     Review of Systems    PHYSICAL EXAM:      Vitals:    03/31/22 1353   BP: (!) 127/58   Pulse: 83   Temp: 97.2 °F (36.2 °C)   SpO2: 98%   Weight: (!) 305 lb (138.3 kg) Height: 5' (1.524 m)     BP Readings from Last 3 Encounters:   03/31/22 (!) 127/58   11/09/21 135/85   10/04/21 (!) 145/78      General appearance - alert, well appearing, and in no distress  Mental status - alert, oriented to person, place, and time  Mouth - mucous membranes moist, pharynx normal without lesions  Neck - supple, no significant adenopathy  Chest - clear to auscultation, no wheezes, rales or rhonchi, symmetric air entry  Heart - normal rate, regular rhythm, normal S1, S2, no murmurs, rubs, clicks or gallops  Abdomen - soft, nontender, nondistended, no masses or organomegaly  Neurological - alert, oriented, normal speech, no focal findings or movement disorder noted  Extremities - peripheral pulses normal, no pedal edema, no clubbing or cyanosis  Skin - normal coloration and turgor, no rashes, no suspicious skin lesions noted    LABORATORY FINDINGS:    CBC:  Lab Results   Component Value Date    WBC 10.1 09/07/2021    HGB 12.8 09/07/2021     09/07/2021       BMP:    Lab Results   Component Value Date     07/12/2021    K 3.9 07/12/2021     07/12/2021    CO2 25 07/12/2021    BUN 10 07/12/2021    CREATININE 0.38 07/12/2021    GLUCOSE 107 07/12/2021       HEMOGLOBIN A1C:   Lab Results   Component Value Date    LABA1C 6.3 09/24/2021       FASTING LIPID PANEL:  Lab Results   Component Value Date    CHOL 149 03/01/2021    HDL 44 03/01/2021    TRIG 118 03/01/2021       ASSESSMENT AND PLAN:      1. Localized osteoarthritis of left knee  - Follow with Orthopedic surgery. Receiving steroid injections. We will avoid narcotic medication at this time. Patient continues to be in severe pain, will discuss formally about chronic narcotic pain medication and signing a pain agreement. - Will benefit from knee replacement. Letter for work restriction given. Follow-up with PCP in 2 to 4 weeks. 2. Obesity, morbid, BMI 50 or higher ([facility]  - Has an appointment with bariatric surgery.     3. Positive depression screening  - Referral to psychology. - Will reevaluate in 2-4 weeks and decide about antidepressants. 4. Hypertension, unspecified type  - Controlled. - Continue current medications. FOLLOW UP AND INSTRUCTIONS:   No follow-ups on file. · Usman Mejias received counseling on the following healthy behaviors: nutrition, exercise, medication adherence, tobacco cessation and decrease in alcohol consumption    · Discussed use, benefit, and side effects of prescribed medications. Barriers to medication compliance addressed. All patient questions answered. Pt voiced understanding. · Patient given educational materials - see patient instructions        Shannon Maguire MD  Internal Medicine Resident, Y- [facility]; [place], [place]  3/31/2022, 2:29 PM      PHQ-9 score today: (PHQ-9 Total Score: 18), additional evaluation and assessment performed, follow-up plan includes but not limited to: Medication management and Referral to /Specialist  for evaluation and management.

## 2022-05-17 ENCOUNTER — OFFICE VISIT (OUTPATIENT)
Dept: INTERNAL MEDICINE | Age: 47
End: 2022-05-17
Payer: COMMERCIAL

## 2022-05-17 VITALS
SYSTOLIC BLOOD PRESSURE: 115 MMHG | DIASTOLIC BLOOD PRESSURE: 76 MMHG | BODY MASS INDEX: 57.52 KG/M2 | HEIGHT: 60 IN | WEIGHT: 293 LBS

## 2022-05-17 DIAGNOSIS — L73.2 HIDRADENITIS SUPPURATIVA: ICD-10-CM

## 2022-05-17 DIAGNOSIS — E66.01 OBESITY, MORBID, BMI 50 OR HIGHER (HCC): Chronic | ICD-10-CM

## 2022-05-17 DIAGNOSIS — K59.00 CONSTIPATION, UNSPECIFIED CONSTIPATION TYPE: ICD-10-CM

## 2022-05-17 DIAGNOSIS — E11.9 TYPE 2 DIABETES MELLITUS WITHOUT COMPLICATION, WITHOUT LONG-TERM CURRENT USE OF INSULIN (HCC): Primary | ICD-10-CM

## 2022-05-17 DIAGNOSIS — I10 HYPERTENSION, UNSPECIFIED TYPE: Chronic | ICD-10-CM

## 2022-05-17 LAB — HBA1C MFR BLD: 6.2 %

## 2022-05-17 PROCEDURE — 2022F DILAT RTA XM EVC RTNOPTHY: CPT | Performed by: STUDENT IN AN ORGANIZED HEALTH CARE EDUCATION/TRAINING PROGRAM

## 2022-05-17 PROCEDURE — 4004F PT TOBACCO SCREEN RCVD TLK: CPT | Performed by: STUDENT IN AN ORGANIZED HEALTH CARE EDUCATION/TRAINING PROGRAM

## 2022-05-17 PROCEDURE — 83036 HEMOGLOBIN GLYCOSYLATED A1C: CPT | Performed by: STUDENT IN AN ORGANIZED HEALTH CARE EDUCATION/TRAINING PROGRAM

## 2022-05-17 PROCEDURE — G8417 CALC BMI ABV UP PARAM F/U: HCPCS | Performed by: STUDENT IN AN ORGANIZED HEALTH CARE EDUCATION/TRAINING PROGRAM

## 2022-05-17 PROCEDURE — G8427 DOCREV CUR MEDS BY ELIG CLIN: HCPCS | Performed by: STUDENT IN AN ORGANIZED HEALTH CARE EDUCATION/TRAINING PROGRAM

## 2022-05-17 PROCEDURE — 3044F HG A1C LEVEL LT 7.0%: CPT | Performed by: STUDENT IN AN ORGANIZED HEALTH CARE EDUCATION/TRAINING PROGRAM

## 2022-05-17 PROCEDURE — 99213 OFFICE O/P EST LOW 20 MIN: CPT | Performed by: STUDENT IN AN ORGANIZED HEALTH CARE EDUCATION/TRAINING PROGRAM

## 2022-05-17 RX ORDER — CLINDAMYCIN PHOSPHATE 10 UG/ML
LOTION TOPICAL
Qty: 60 ML | Refills: 3 | Status: SHIPPED | OUTPATIENT
Start: 2022-05-17 | End: 2022-10-04 | Stop reason: SDUPTHER

## 2022-05-17 RX ORDER — DOCUSATE SODIUM 100 MG/1
100 CAPSULE, LIQUID FILLED ORAL 2 TIMES DAILY
Qty: 60 CAPSULE | Refills: 0 | Status: SHIPPED | OUTPATIENT
Start: 2022-05-17 | End: 2022-06-16

## 2022-05-17 NOTE — PROGRESS NOTES
[facility] [address] [address]  Dept: [phone]  Dept Fax: [phone]    Office Progress/Follow Up Note  Date ofpatient's visit: 5/24/2022  Patient's Name:  Teagan Hannon YOB: 1975            Patient Care Team:  Lanny Brooke MD as PCP - General (Internal Medicine)  ================================================================    REASON FOR VISIT/CHIEF COMPLAINT:  Diabetes, Hypertension, and Nicotine Dependence (Patient wants to stop smoking )    HISTORY OF PRESENTING ILLNESS:  History was obtained from: patient, electronic medical record. Ian tanner 42 y.o. is here for a follow up visit. Morbid obesity with BMI of 59: Patient being evaluated for bariatric surgery. Hydradenitis suppurativa: Patient states symptoms have been fairly controlled apart from some pain on the plantar side of the foot. Patient continues take her doxycycline as states symptoms are controlled. Prediabetes: HbA1c during this office visit 6.2. Patient continued on metformin 500 mg twice daily. Constipation: Patient states that she has some constipation. She states that she does not drink a lot of water but tries to eat a lot of fruits for roughage.     Patient Active Problem List   Diagnosis    Hyperlipidemia    Hypertension    Diabetes mellitus ([address]    GERD (gastroesophageal reflux disease)    Obesity, morbid, BMI 50 or higher (HCC)    Hidradenitis suppurativa    Sleep apnea    Perirectal abscess    Iron deficiency anemia    Dysmenorrhea    Menorrhagia    Acute cystitis without hematuria    Abnormal uterine bleeding (AUB)    Hysteroscopy, D&C, Myosure, Mirena IUD Insertion 11/18/19    Retained intrauterine contraceptive device (IUD)    Localized osteoarthritis of left knee       Health Maintenance Due   Topic Date Due    Pneumococcal 0-64 years Vaccine (1 - PCV) Never done    Hepatitis B vaccine (1 of 3 - Risk 3-dose series) Never done    COVID-19 Vaccine (3 - Booster for Pfizer series) 01/17/2022    Cervical cancer screen  04/07/2022       No Known Allergies      Current Outpatient Medications   Medication Sig Dispense Refill    benzoyl peroxide 5 % external liquid Wash face, chest and back 1-2 times daily 227 g 3    clindamycin (CLEOCIN T) 1 % lotion Apply to affected areas daily 60 mL 3    docusate sodium (COLACE) 100 MG capsule Take 1 capsule by mouth 2 times daily 60 capsule 0    Semaglutide 3 MG TABS Take 1 each by mouth daily 30 tablet 3    tiZANidine (ZANAFLEX) 4 MG tablet Take 1 tablet by mouth every 8 hours as needed (muscle spasms/pain) 90 tablet 0    naproxen (NAPROSYN) 500 MG tablet Take 1 tablet by mouth 2 times daily as needed for Pain 60 tablet 0    metFORMIN (GLUCOPHAGE) 500 MG tablet take 1 tablet by mouth once daily with breakfast 60 tablet 3    amLODIPine (NORVASC) 10 MG tablet Take 1 tablet by mouth daily 30 tablet 3    acetaminophen (ACETAMINOPHEN EXTRA STRENGTH) 500 MG tablet Take 1 tablet by mouth every 6 hours as needed for Pain 120 tablet 3    gabapentin (NEURONTIN) 300 MG capsule Take 1 capsule by mouth 3 times daily for 30 days. Take 1 capsule by mouth 3 daily 90 capsule 0    doxycycline hyclate (VIBRAMYCIN) 100 MG capsule take 1 capsule by mouth twice a day with food 60 capsule 3     No current facility-administered medications for this visit.        Social History     Tobacco Use    Smoking status: Current Every Day Smoker     Packs/day: 0.50     Years: 30.00     Pack years: 15.00     Types: Cigarettes    Smokeless tobacco: Never Used   Vaping Use    Vaping Use: Never used   Substance Use Topics    Alcohol use: Not Currently     Comment: Occasionally    Drug use: Yes     Frequency: 1.5 times per week     Types: Marijuana (Weed)     Comment: 4 x a month       Family History   Problem Relation Age of Onset    Vision Loss Mother     Glaucoma Mother     Diabetes Father  High Blood Pressure Father     Glaucoma Father     Vision Loss Father     Kidney Cancer Maternal Grandmother     Kidney stones Brother     No Known Problems Maternal Grandfather     Diabetes Paternal Grandmother     Other Brother         stomach problem        REVIEW OF SYSTEMS:  Review of Systems   Constitutional: Negative for activity change, appetite change and fever. HENT: Negative for congestion. Respiratory: Negative for cough, chest tightness, shortness of breath and wheezing. Cardiovascular: Negative for chest pain, palpitations and leg swelling. Gastrointestinal: Positive for constipation. Negative for abdominal pain, blood in stool, diarrhea, nausea and vomiting. Genitourinary: Negative for difficulty urinating. Musculoskeletal: Negative for arthralgias and back pain. Foot pain   Neurological: Negative for dizziness, light-headedness and headaches. Psychiatric/Behavioral: Negative for behavioral problems, confusion and decreased concentration. PHYSICAL EXAM:  Vitals:    05/17/22 1429   BP: 115/76   Weight: (!) 302 lb 3.2 oz (137.1 kg)   Height: 5' (1.524 m)     BP Readings from Last 3 Encounters:   05/17/22 115/76   03/31/22 (!) 127/58   11/09/21 135/85        Physical Exam  Constitutional:       Appearance: Normal appearance. HENT:      Head: Normocephalic and atraumatic. Eyes:      Extraocular Movements: Extraocular movements intact. Pupils: Pupils are equal, round, and reactive to light. Cardiovascular:      Rate and Rhythm: Normal rate and regular rhythm. Pulses: Normal pulses. Heart sounds: Normal heart sounds. No murmur heard. Pulmonary:      Effort: Pulmonary effort is normal. No respiratory distress. Breath sounds: Normal breath sounds. No wheezing. Chest:      Chest wall: No tenderness. Abdominal:      General: Abdomen is flat. Bowel sounds are normal. There is no distension. Palpations: Abdomen is soft. Tenderness: There is no abdominal tenderness. There is no guarding. Musculoskeletal:         General: Tenderness present. Right lower leg: No edema. Left lower leg: No edema. Neurological:      Mental Status: She is alert and oriented to person, place, and time. Psychiatric:         Behavior: Behavior normal.           DIAGNOSTIC FINDINGS:  CBC:  Lab Results   Component Value Date    WBC 10.1 09/07/2021    HGB 12.8 09/07/2021     09/07/2021       BMP:    Lab Results   Component Value Date     07/12/2021    K 3.9 07/12/2021     07/12/2021    CO2 25 07/12/2021    BUN 10 07/12/2021    CREATININE 0.38 07/12/2021    GLUCOSE 107 07/12/2021       HEMOGLOBIN A1C:   Lab Results   Component Value Date    LABA1C 6.2 05/17/2022       FASTING LIPID PANEL:  Lab Results   Component Value Date    CHOL 149 03/01/2021    HDL 48 05/23/2022    TRIG 118 03/01/2021       ASSESSMENT AND PLAN:  Evelyn Dawkins was seen today for diabetes, hypertension and nicotine dependence. Diagnoses and all orders for this visit:    Type 2 diabetes mellitus without complication, without long-term current use of insulin ([facility])  -     POCT glycosylated hemoglobin (Hb A1C)  -     Lipid, Fasting; Future  -      DIABETES FOOT EXAM    Hidradenitis suppurativa  -     benzoyl peroxide 5 % external liquid; Wash face, chest and back 1-2 times daily  -     clindamycin (CLEOCIN T) 1 % lotion; Apply to affected areas daily    Hypertension, unspecified type    Obesity, morbid, BMI 50 or higher ([facility])  -     TSH With Reflex Ft4; Future  -     Semaglutide 3 MG TABS; Take 1 each by mouth daily    Constipation, unspecified constipation type    Other orders  -     docusate sodium (COLACE) 100 MG capsule; Take 1 capsule by mouth 2 times daily      FOLLOW UP AND INSTRUCTIONS:  Return in about 2 months (around 7/17/2022).     · Evelyn Dawkins received counseling on the following healthy behaviors: nutrition, exercise and medication adherence    · Discussed use, benefit, and side effects of prescribed medications. Barriers to medication compliance addressed. All patient questions answered. Pt voiced understanding. · Patient given educational materials - see patient instructions        Oscar Anderson MD  Internal Medicine Resident, PGY-2  [address] ;  Jen Gomes  6/00/4614,65:55 AM

## 2022-05-17 NOTE — PROGRESS NOTES
Attending Physician Statement  I have discussed the care of Katiuska Null including pertinent history and exam findings,  with the resident. I have reviewed the key elements of all parts of the encounter with the resident. I agree with the assessment, plan and orders as documented by the resident.   (GE Modifier)      semaglutide oral ordered for weight loss while she gets evaluated for bariatric surgery    MD MANNIE Alex  Attending Physician, [address] [place] [address], Internal Medicine Residency Program  [address] [place] [address]  5/17/2022, 3:25 PM

## 2022-05-20 ENCOUNTER — TELEPHONE (OUTPATIENT)
Dept: INTERNAL MEDICINE | Age: 47
End: 2022-05-20

## 2022-05-20 NOTE — TELEPHONE ENCOUNTER
Prior Authorization request received for Rybelsus 3MG tablets    Prior Authorization processed and submitted to patient's insurance, waiting for response in regards to medication coverage

## 2022-05-23 ENCOUNTER — TELEPHONE (OUTPATIENT)
Dept: INTERNAL MEDICINE | Age: 47
End: 2022-05-23

## 2022-05-23 ENCOUNTER — HOSPITAL ENCOUNTER (OUTPATIENT)
Age: 47
Discharge: HOME | End: 2022-05-23
Payer: COMMERCIAL

## 2022-05-23 DIAGNOSIS — E11.9 TYPE 2 DIABETES MELLITUS WITHOUT COMPLICATION, WITHOUT LONG-TERM CURRENT USE OF INSULIN (HCC): ICD-10-CM

## 2022-05-23 LAB
CHOLESTEROL, FASTING: 151 MG/DL
CHOLESTEROL/HDL RATIO: 3.1
HDLC SERPL-MCNC: 48 MG/DL
LDL CHOLESTEROL: 88 MG/DL (ref 0–130)
TRIGLYCERIDE, FASTING: 75 MG/DL

## 2022-05-23 PROCEDURE — 80061 LIPID PANEL: CPT

## 2022-05-23 PROCEDURE — 36415 COLL VENOUS BLD VENIPUNCTURE: CPT

## 2022-05-23 NOTE — TELEPHONE ENCOUNTER
Fax here from [facility]. The Prior Authorization Request for Rybelsus 3MG tablets has been denied. Denial information scanned to this encounter. Please review and advise. Denial reason:     Unable to approve [Rybelsus 3MG tablets] due to unmet criteria (1, 2 and 5) as outlined in the guideline. Rybelsus 3MG tablets is not approved by the Foot and Drug Administration (FDA) or recommended by the  for the submitted diagnosis.  (Rybelsus 3MG tablets) is only FDA approved for the treatment of type 2 diabetes mellitus (T2DM)    Please discontinue denied medication in patients medication list.

## 2022-05-23 NOTE — TELEPHONE ENCOUNTER
pc said she like her cholesterol lab results , she was also denied the Rybelsus  weight loss med that was prescribed and would like to know if there is another medication she can try please advise

## 2022-05-24 ASSESSMENT — ENCOUNTER SYMPTOMS
ABDOMINAL PAIN: 0
BACK PAIN: 0
CHEST TIGHTNESS: 0
DIARRHEA: 0
COUGH: 0
VOMITING: 0
SHORTNESS OF BREATH: 0
BLOOD IN STOOL: 0
WHEEZING: 0
NAUSEA: 0
CONSTIPATION: 1

## 2022-05-24 NOTE — TELEPHONE ENCOUNTER
Call the patient to discuss the cholesterol results. Discussed with the patient that all the rest results are normal.  Patient verbalized understanding. Patient good sounds over denial of Ozempic for weight loss as insurance did not approve it. We will look into other medications which can be given for weight loss while patient is worked up for bariatric surgery. We will follow. Sarahi Ross MD  Internal Medicine Resident, PGY-2  [facility];  Daxa Wharton  5/51/0183,2:73 PM

## 2022-05-26 ENCOUNTER — OFFICE VISIT (OUTPATIENT)
Dept: BARIATRICS/WEIGHT MGMT | Age: 47
End: 2022-05-26
Payer: COMMERCIAL

## 2022-05-26 VITALS
BODY MASS INDEX: 55.32 KG/M2 | SYSTOLIC BLOOD PRESSURE: 126 MMHG | HEART RATE: 78 BPM | RESPIRATION RATE: 20 BRPM | WEIGHT: 293 LBS | DIASTOLIC BLOOD PRESSURE: 76 MMHG | HEIGHT: 61 IN

## 2022-05-26 DIAGNOSIS — R06.09 DYSPNEA ON EXERTION: ICD-10-CM

## 2022-05-26 DIAGNOSIS — G47.33 OSA (OBSTRUCTIVE SLEEP APNEA): ICD-10-CM

## 2022-05-26 DIAGNOSIS — E66.9 DIABETES MELLITUS TYPE 2 IN OBESE (HCC): Primary | ICD-10-CM

## 2022-05-26 DIAGNOSIS — E11.69 DIABETES MELLITUS TYPE 2 IN OBESE (HCC): Primary | ICD-10-CM

## 2022-05-26 DIAGNOSIS — K21.9 GASTROESOPHAGEAL REFLUX DISEASE WITHOUT ESOPHAGITIS: ICD-10-CM

## 2022-05-26 DIAGNOSIS — E66.01 MORBID OBESITY WITH BMI OF 50.0-59.9, ADULT (HCC): ICD-10-CM

## 2022-05-26 PROCEDURE — 2022F DILAT RTA XM EVC RTNOPTHY: CPT | Performed by: SURGERY

## 2022-05-26 PROCEDURE — 4004F PT TOBACCO SCREEN RCVD TLK: CPT | Performed by: SURGERY

## 2022-05-26 PROCEDURE — G8427 DOCREV CUR MEDS BY ELIG CLIN: HCPCS | Performed by: SURGERY

## 2022-05-26 PROCEDURE — 3044F HG A1C LEVEL LT 7.0%: CPT | Performed by: SURGERY

## 2022-05-26 PROCEDURE — G8417 CALC BMI ABV UP PARAM F/U: HCPCS | Performed by: SURGERY

## 2022-05-26 PROCEDURE — 99204 OFFICE O/P NEW MOD 45 MIN: CPT | Performed by: SURGERY

## 2022-05-26 NOTE — LETTER
Visit Date: 5/26/2022    Patient: Malgorzata Birmingham  YOB: 1975    Dear Dr. Chante Espinal MD,      I had the pleasure of seeing Mariano Stevens in the office today for a consult for weight loss surgery. Her current Weight: (!) 306 lb (138.8 kg), which gives her a Body mass index is 58.78 kg/m². .  We had a long discussion regarding surgical options for weight loss and improvement in co-morbidities. She is considering a bariatric  procedure. We will start the preoperative workup, which includes bloodwork, psychological evaluation, support group attendance, and preoperative medical clearance from you. We will also initiate pre-certification for surgery, which requires a letter of medical necessity from you and often office notes documenting a weight history and co-morbidities. Mariano Stevens will require 3 months of medically supervised visits as specified by the patient's insurance. Thank you for allowing me to participate in the care of your patient. If you have any questions or concerns, please do not hesitate to call.       Sincerely,     Nav Mendoza DO  Director of Bariatric and Minimally Invasive Surgery  [facility], [address], [place], [address] [place] [address] Bear: [phone]  F: [phone]

## 2022-05-26 NOTE — PROGRESS NOTES
[address] INVASIVE BARIATRIC SURG  [address] [place] [address] [place]  [address] [facility] [address]  Dept: [phone]    SURGICAL WEIGHT MANAGEMENT PROGRAM  PROGRESS NOTE INITIAL EVALUATION     Patient: Konrad Espitia        Service Date: 5/26/2022      HPI:     Chief Complaint   Patient presents with    Bariatric, Initial Visit    Weight Loss       The patient is a pleasant 42y.o. year old female  with morbid obesity, who stands Height: 5' 0.5\" (153.7 cm) tall with a weight of Weight: (!) 306 lb (138.8 kg) , resulting in a BMI of Body mass index is 58.78 kg/m². . The patient suffers from multiple co-morbidities as a result of morbid obesity, including: Type 2 Diabetes Mellitus, Hypertension, Obstructive Sleep Apnea treated with BiPAP/CPAP, Dyspnea on Exertion and GERD. She has suffered from obesity for many years. The patient denies  a history of myocardial infarction, deep vein thrombosis, pulmonary embolism, renal failure, hepatic failure and stroke. The patient has failed multiple attempts at non-surgical weight loss, and is now seeking surgical intervention to promote permanent and consistent weight loss. She  has chosen Sleeve Gastrectomy. She is well educated regarding it, as she has recently viewed our weight loss surgery informational seminar .      Medical History:  Past Medical History:   Diagnosis Date    Abnormal uterine bleeding (AUB)     Anemia     Arthritis     KNEE AND BACK    Asthma     Diabetes mellitus (HCC)     GERD (gastroesophageal reflux disease)     Hidradenitis suppurativa     History of blood transfusion 1997    Hyperlipidemia     Hypertension     Leg swelling     Obesity, morbid, BMI 50 or higher ([facility] [address]     Sleep apnea     needs new machine    Wears dentures     never wears them    Wellness examination 07/12/2021    [facility]-last visit june 2021       Surgical History:  Past Surgical History:   Procedure Laterality Date    ABSCESS DRAINAGE  12/23/2016    BUTTOCK    DILATION AND CURETTAGE OF UTERUS N/A 11/18/2019    DILATATION AND CURETTAGE HYSTEROSCOPY, Avril Dumont, MIRENA IUD INSERTION performed by Koko Ramos MD at [facility] N/A 7/26/2021    IUD REMOVAL, HYSTEROSCOPY,  MICHELE ENDOMETRIAL ABLATION REP CONFIRMED BY OFFC performed by Koko Ramos MD at [facility] N/A 7/26/2021    DILATATION AND CURETTAGE performed by Koko Ramos MD at [facility] N/A 07/02/2018    RECTAL PERIRECTAL INCISION AND DRAINAGE performed by Matthew Worthington DO at [facility]       Family History:      Problem Relation Age of Onset   Gabriella Stabs Vision Loss Mother     Glaucoma Mother     Diabetes Father     High Blood Pressure Father     Glaucoma Father     Vision Loss Father     Kidney Cancer Maternal Grandmother     Kidney stones Brother     No Known Problems Maternal Grandfather     Diabetes Paternal Grandmother     Other Brother         stomach problem       Social History:   Social History     Tobacco Use    Smoking status: Current Every Day Smoker     Packs/day: 0.50     Years: 30.00     Pack years: 15.00     Types: Cigarettes    Smokeless tobacco: Never Used   Vaping Use    Vaping Use: Never used   Substance Use Topics    Alcohol use: Not Currently     Comment: Occasionally    Drug use: Yes     Frequency: 1.5 times per week     Types: Marijuana (Weed)     Comment: 4 x a month       Current Med List:  Current Outpatient Medications   Medication Sig Dispense Refill    benzoyl peroxide 5 % external liquid Wash face, chest and back 1-2 times daily 227 g 3    clindamycin (CLEOCIN T) 1 % lotion Apply to affected areas daily 60 mL 3    docusate sodium (COLACE) 100 MG capsule Take 1 capsule by mouth 2 times daily 60 capsule 0    Semaglutide 3 MG TABS Take 1 each by mouth daily 30 tablet 3    tiZANidine (ZANAFLEX) 4 MG tablet Take 1 tablet by mouth every 8 hours as needed (muscle spasms/pain) 90 tablet 0    naproxen (NAPROSYN) 500 MG tablet Take 1 tablet by mouth 2 times daily as needed for Pain 60 tablet 0    metFORMIN (GLUCOPHAGE) 500 MG tablet take 1 tablet by mouth once daily with breakfast 60 tablet 3    amLODIPine (NORVASC) 10 MG tablet Take 1 tablet by mouth daily 30 tablet 3    acetaminophen (ACETAMINOPHEN EXTRA STRENGTH) 500 MG tablet Take 1 tablet by mouth every 6 hours as needed for Pain 120 tablet 3    gabapentin (NEURONTIN) 300 MG capsule Take 1 capsule by mouth 3 times daily for 30 days. Take 1 capsule by mouth 3 daily 90 capsule 0    doxycycline hyclate (VIBRAMYCIN) 100 MG capsule take 1 capsule by mouth twice a day with food 60 capsule 3     No current facility-administered medications for this visit. No Known Allergies     SOCIAL:      This patient is alone for the evaluation today.       [] HIV Risk Factors (i.e.) intravenous drug abuser; at risk sexual behavior; received blood products    [] TB Risk Factors (i.e.) Medically underserved, institutional care, foreign born, endemic area; exposure to active case    [] Hepatitis B&C Risk Factors (i.e.) Received blood transfusion prior to 1992; recreational drug use; high risk sexual behaviors; tattoos or body piercings; contact with blood or needle sticks in the workplace    Comprehension    Ability to grasp concepts and respond to questions:   [x] High   [] Medium   [] Low    Motivation    [x] Asks Questions; eager to learn   [] Needs education   [] Extreme anxiety    [] uncooperative   [] Denies need for education    English Speaking Ability    [x] Speaks English well   [x] Reads English well   [x] Understands spoken english    [x] Understands written English   [] No need for interpretive support      [] Might benefit from interpretive support   []  required for all services     REVIEW OF SYSTEMS: (Negative unless marked otherwise)     See review of Systems scanned into media    PRESENT ILLNESS:     Weight Parameters  Weight (!) 306 lb (138.8 kg)   Height 5' 0.5\" (1.537 m)   BMI Body mass index is 58.78 kg/m². IBW     EBW               IMMUNIZATION STATUS  Immunization History   Administered Date(s) Administered    COVID-19, Pfizer Purple top, DILUTE for use, 12+ yrs, 30mcg/0.3mL dose 07/27/2021, 08/17/2021    HPV 9-valent Armin [employer]) 08/09/2019    Tdap (Boostrix, Adacel) 09/24/2021       FALLS ASSESSMENT    [x] LOW RISK FOR FALLS    [] MODERATE RISK FOR FALLS    [] Difficulty walking/selfcare    [] Falls in the past 2 months    [] Suspicion of Clinician    [] Other:      SMOKING CESSATION     [] Not needed     [] Instructed to stop smoking    [] Pamphlet community resources given     VTE SCREEN    [] Family hx DVT/PE  /   [] Personal hx of DVT/PE    [x] Denies any family or personal hx of DVT/PE    Physician Review    [x] Past medical, family, & social history reviewed and discussed with patient. Review of surgery and post-surgical changes (by surgeon for surgical patients only)    [x] Lifelong diet expectations reviewed with patient    [x] Need for lifelong vitamin supplementation reviewed with patient    PHYSICAL EXAMINATION:      /76 (Site: Right Upper Arm, Position: Sitting, Cuff Size: Large Adult)   Pulse 78   Resp 20   Ht 5' 0.5\" (1.537 m)   Wt (!) 306 lb (138.8 kg)   BMI 58.78 kg/m²     Constitutional:  Vital signs are normal. The patient appears well-developed   HEENT:      Head: Normocephalic. Atraumatic     Eyes: pupils are equal and reactive. No scleral icterus is present. Neck: No mass and no thyromegaly present. Cardiovascular: Normal rate, regular rhythm, S1 normal and S2 normal.  Bilateral pulses present. Pulmonary/Chest: Effort normal and breath sounds normal. No retractions. Abdominal: Soft. Normal appearance. There is no organomegaly. No tenderness.  There is no rigidity, no rebound, no guarding and no Levine's sign. Musculoskeletal:      Right lower leg: Normal. No tenderness and no edema. Left lower leg: Normal. No tenderness and no edema. Lymphadenopathy:     No cervical adenopathy, No Exrtemity Adenopathy. Neurological: The patient is alert and oriented. Moving all four extremities equally, sensation grossly intact bilateral.  Skin: Skin is warm, dry and intact. Psychiatric: The patient has a normal mood and affect. Speech is normal and behavior is normal. Judgment and thought content normal. Cognition and memory are normal.     RECOMMENDATIONS:     We spent a great deal of time discussing the risks and benefits of Sleeve Gastrectomy, including but not limited to injury to intra-abdominal organs, breakdown of the gastric staple line, the need for re-operative therapy,  prolonged hospitalization,  mechanical ventilation,  and death. We discussed the possibility of bleeding, the need for blood transfusions, blood clots, hospital-acquired and intra-abdominal infection, anastomotic stricture, and worsening GERD. And we discussed the need for post-operative visit compliance, behavior modifications and diet changes, protein and vitamin supplementation, as well as routine scheduled and dedicated exercise. I instructed the patient to utilize the exercise log that will be given to them at their fist dietician appointment. We discussed the potential weight loss benefit of approximately 60-70% of her excess body weight at 12-18 months post-op, as well as the possibility of insufficient weight loss or weight gain after 2 years post-operative time. Discussed the risk of substance abuse and or nicotine abuse today with patient. They expressed understanding of the risks of abuse of such drugs. PLAN:       Diagnosis Orders   1. Diabetes mellitus type 2 in obese ([facility] [address]     2.  Dyspnea on exertion  Nhan Allen MD, Pulmonology, Aren Genao MD, Cardiology, [address]    Full PFT Study With Bronchodilator    XR CHEST STANDARD (2 VW)   3. DALILA (obstructive sleep apnea)  Baseline Diagnostic Sleep Study    Sleep Study with PAP Titration   4. Gastroesophageal reflux disease without esophagitis     5. Morbid obesity with BMI of 50.0-59.9, adult ([facility] [address]  Baseline Diagnostic Sleep Study    Sleep Study with PAP Titration          Initial Testing     Primary Procedure: Sleeve Gastrectomy     Other Procedures:None    Labwork: Initial Pre-surgical Lab Tests (CMP, TSH, Fasting Lipid Profile, Mg, Zinc, Vit B1 (whole blood), Vit B12, 25-OH Vit D, Fe,  Ferritin,  Folate), Urine drug and alcohol screen  and Negative serum nicotine prior to submission for pre-auth    Imaging: None    Endoscopic Studies: Upper GI Endoscopy for abdominal pain and GERD which has been untreated. Psychological Assessment: Psychological Evaluation and Clearance    Nutrition Assessment: Bariatric Nutrition Assessment and Clearance    Cardiology Risk Stratification:  Cardiology clearance with echo    Pulmonary Evaluation: Obstructive Sleep Apnea Evaluation, Pulmonary Clearance, PFT Screen, Copy of Previous Sleep Study and CPAP Settings    Other  Consultations: Medical clearance for BMI 58    Physician Supervised Diet and Exercise required by the patients insurance company: 3 months.       Surgical Diet requirement:  2 weeks      Final Testing  Screening Chest Xray  and EKG within 6 months of date of surgery    Labwork:  Final Lab Tests  within 3 months of date of surgery (CBC, PT/PTT, BMP)     Electronically signed by Luann Abrams DO on 6/1/2022 at 9:45 PM

## 2022-05-31 ENCOUNTER — HOSPITAL ENCOUNTER (OUTPATIENT)
Age: 47
Discharge: HOME | End: 2022-06-02
Payer: COMMERCIAL

## 2022-05-31 ENCOUNTER — HOSPITAL ENCOUNTER (OUTPATIENT)
Dept: PULMONOLOGY | Age: 47
Discharge: HOME | End: 2022-05-31
Payer: COMMERCIAL

## 2022-05-31 ENCOUNTER — HOSPITAL ENCOUNTER (OUTPATIENT)
Dept: GENERAL RADIOLOGY | Age: 47
Discharge: HOME | End: 2022-06-02
Payer: COMMERCIAL

## 2022-05-31 DIAGNOSIS — R06.09 DYSPNEA ON EXERTION: ICD-10-CM

## 2022-05-31 PROCEDURE — 94729 DIFFUSING CAPACITY: CPT

## 2022-05-31 PROCEDURE — 94060 EVALUATION OF WHEEZING: CPT

## 2022-05-31 PROCEDURE — 94726 PLETHYSMOGRAPHY LUNG VOLUMES: CPT

## 2022-05-31 PROCEDURE — 71046 X-RAY EXAM CHEST 2 VIEWS: CPT

## 2022-05-31 PROCEDURE — 94640 AIRWAY INHALATION TREATMENT: CPT

## 2022-05-31 PROCEDURE — 94664 DEMO&/EVAL PT USE INHALER: CPT

## 2022-06-02 ENCOUNTER — HOSPITAL ENCOUNTER (OUTPATIENT)
Dept: SLEEP CENTER | Age: 47
Discharge: HOME | End: 2022-06-04
Payer: COMMERCIAL

## 2022-06-02 VITALS
HEIGHT: 60 IN | HEART RATE: 80 BPM | BODY MASS INDEX: 57.52 KG/M2 | WEIGHT: 293 LBS | OXYGEN SATURATION: 95 % | RESPIRATION RATE: 40 BRPM

## 2022-06-02 DIAGNOSIS — G89.29 OTHER CHRONIC PAIN: ICD-10-CM

## 2022-06-02 DIAGNOSIS — G47.33 OSA (OBSTRUCTIVE SLEEP APNEA): ICD-10-CM

## 2022-06-02 DIAGNOSIS — E66.01 MORBID OBESITY WITH BMI OF 50.0-59.9, ADULT (HCC): ICD-10-CM

## 2022-06-02 DIAGNOSIS — E66.01 OBESITY, MORBID, BMI 50 OR HIGHER (HCC): Chronic | ICD-10-CM

## 2022-06-02 PROCEDURE — 95810 POLYSOM 6/> YRS 4/> PARAM: CPT

## 2022-06-03 RX ORDER — GABAPENTIN 300 MG/1
300 CAPSULE ORAL 3 TIMES DAILY
Qty: 90 CAPSULE | Refills: 0 | Status: SHIPPED | OUTPATIENT
Start: 2022-06-03 | End: 2022-08-08

## 2022-06-03 NOTE — TELEPHONE ENCOUNTER
Request for Gabapentin. Next Visit Date:  Future Appointments   Date Time Provider Red Storyi   6/9/2022 10:00 AM SCHEDULE, [facility] bariatric [address] Maintenance   Topic Date Due    Pneumococcal 0-64 years Vaccine (1 - PCV) Never done    Hepatitis B vaccine (1 of 3 - Risk 3-dose series) Never done    COVID-19 Vaccine (3 - Booster for Pfizer series) 01/17/2022    Cervical cancer screen  04/07/2022    Diabetic retinal exam  07/06/2022    Flu vaccine (Season Ended) 09/01/2022    Diabetic microalbuminuria test  09/29/2022    Depression Monitoring  03/31/2023    Diabetic foot exam  05/17/2023    A1C test (Diabetic or Prediabetic)  05/17/2023    Lipids  05/23/2023    DTaP/Tdap/Td vaccine (2 - Td or Tdap) 09/24/2031    Hepatitis C screen  Completed    HIV screen  Completed    Hepatitis A vaccine  Aged Out    Hib vaccine  Aged Out    Meningococcal (ACWY) vaccine  Aged Out       Hemoglobin A1C (%)   Date Value   05/17/2022 6.2   09/24/2021 6.3   03/01/2021 6.4             ( goal A1C is < 7)   Microalb/Crt.  Ratio (mcg/mg creat)   Date Value   09/29/2021 8     LDL Cholesterol (mg/dL)   Date Value   05/23/2022 88       (goal LDL is <100)   AST (U/L)   Date Value   05/11/2018 11     ALT (U/L)   Date Value   05/11/2021 12     BUN (mg/dL)   Date Value   07/12/2021 10     BP Readings from Last 3 Encounters:   05/26/22 126/76   05/17/22 115/76   03/31/22 (!) 127/58          (goal 120/80)    All Future Testing planned in [facility]  Lab Frequency Next Occurrence   ESTEBAN DIGITAL SCREEN W OR WO CAD BILATERAL Once 11/09/2022   TSH With Reflex Ft4 Once 05/17/2022   Sleep Study with PAP Titration Once 05/26/2022         Patient Active Problem List:     Hyperlipidemia     Hypertension     Diabetes mellitus ([facility] [address]     GERD (gastroesophageal reflux disease)     Obesity, morbid, BMI 50 or higher (HCC)     Hidradenitis suppurativa     Sleep apnea     Perirectal abscess     Iron deficiency anemia     Dysmenorrhea     Menorrhagia     Acute cystitis without hematuria     Abnormal uterine bleeding (AUB)     Hysteroscopy, D&C, Myosure, Mirena IUD Insertion 11/18/19     Retained intrauterine contraceptive device (IUD)     Localized osteoarthritis of left knee

## 2022-06-16 LAB — STATUS: NORMAL

## 2022-06-29 ENCOUNTER — HOSPITAL ENCOUNTER (OUTPATIENT)
Dept: SLEEP CENTER | Age: 47
Discharge: HOME | End: 2022-07-01
Payer: COMMERCIAL

## 2022-06-29 DIAGNOSIS — E66.01 MORBID OBESITY WITH BMI OF 50.0-59.9, ADULT (HCC): ICD-10-CM

## 2022-06-29 DIAGNOSIS — G47.33 OSA (OBSTRUCTIVE SLEEP APNEA): ICD-10-CM

## 2022-06-29 PROCEDURE — 95811 POLYSOM 6/>YRS CPAP 4/> PARM: CPT

## 2022-07-18 LAB — STATUS: NORMAL

## 2022-07-18 PROCEDURE — 95811 POLYSOM 6/>YRS CPAP 4/> PARM: CPT | Performed by: PSYCHIATRY & NEUROLOGY

## 2022-08-03 ENCOUNTER — OFFICE VISIT (OUTPATIENT)
Dept: ORTHOPEDIC SURGERY | Age: 47
End: 2022-08-03
Payer: COMMERCIAL

## 2022-08-03 VITALS — WEIGHT: 293 LBS | BODY MASS INDEX: 57.52 KG/M2 | HEIGHT: 60 IN

## 2022-08-03 DIAGNOSIS — M25.562 LEFT KNEE PAIN, UNSPECIFIED CHRONICITY: Primary | ICD-10-CM

## 2022-08-03 DIAGNOSIS — S83.242A TEAR OF MEDIAL MENISCUS OF LEFT KNEE, CURRENT, UNSPECIFIED TEAR TYPE, INITIAL ENCOUNTER: ICD-10-CM

## 2022-08-03 PROCEDURE — 4004F PT TOBACCO SCREEN RCVD TLK: CPT

## 2022-08-03 PROCEDURE — 20610 DRAIN/INJ JOINT/BURSA W/O US: CPT

## 2022-08-03 PROCEDURE — G8427 DOCREV CUR MEDS BY ELIG CLIN: HCPCS

## 2022-08-03 PROCEDURE — G8417 CALC BMI ABV UP PARAM F/U: HCPCS

## 2022-08-03 RX ORDER — METHYLPREDNISOLONE ACETATE 80 MG/ML
80 INJECTION, SUSPENSION INTRA-ARTICULAR; INTRALESIONAL; INTRAMUSCULAR; SOFT TISSUE ONCE
Status: SHIPPED | OUTPATIENT
Start: 2022-08-03

## 2022-08-03 RX ORDER — LIDOCAINE HYDROCHLORIDE 10 MG/ML
2 INJECTION, SOLUTION INFILTRATION; PERINEURAL ONCE
Status: SHIPPED | OUTPATIENT
Start: 2022-08-03

## 2022-08-03 NOTE — PROGRESS NOTES
[address]  [address] [facility] [address]  Dept: [phone]  Dept Fax: [phone]        Orthopaedic Clinic Follow Up      Subjective:     Saroj Cornejo is a 42y.o. year old female who presents to the clinic today for routine follow up regarding her knee pain. Patient also endorses left achilles pain and right plantar fascial pain of the foot, but states she wants to leave quick and only wants her injection. Patient understands that her meniscus is torn, but states that she does not want any surgical intervention for her left knee. Patient states she has taken up to 10 ibuprofen a day without any relief. Patient last presented to the orthopedic clinic on 9/21/2021, in which a injection to her left knee was done and the patient states that she was given her significant relief for short period of time. Patient presents today for repeat injection. Patient also notes that she did formal physical therapy for her left knee, but states it was not helping so she started doing home exercise program instead of going to physical physical therapy. Patient feels like she is having most pain the lateral aspect of the knee, and states that it occasionally locks and gives    Review of Systems  Gen: no fever, chills, malaise  CV: no chest pain or palpitations  Resp: no cough or shortness of breath  GI: no nausea, vomiting, diarrhea, or constipation  Neuro: no seizures, vertigo, or headache  Msk: As per HPI  10 remaining systems reviewed and negative    Objective : There were no vitals filed for this visit. Body mass index is 59.57 kg/m². General: No acute distress, resting comfortably in the clinic  Neuro: alert. oriented  Eyes: Extra-ocular muscles intact  Pulm: Respirations unlabored and regular. Skin: warm, well perfused  Psych:   Patient has good fund of knowledge and displays understanding of exam, diagnosis, and plan.   MSK: LLE: Skin intact, no erythema, warmth, skin lesions, signs of infection. Mild knee effusion appreciated. Patient able to extend to 0 degrees and flex to 95 degrees, with soft tissue halting her from terminal range of motion. No instability with varus/valgus stress applied at 0 and 30 degrees of flexion. Negative anterior drawer and posterior drawer. Lachman test Ia. Increased pain with lateral Melvin's test, but no palpable click. No calf tenderness. Negative logroll. Negative Stinchfield test.  EHL/FHL/TA/GS complex motor intact. Sural/saphenous/SPN/DPN/plantar nerve distribution SILT. DP and PT pulses 2+ with BCR. Radiology:  History: Left knee pain    Findings: 4 views of the left knee (AP/lateral/merchant/tunnel) showing moderate medial joint space narrowing, tricompartmental osteophyte phytosis, joint space sclerosis medially. No evidence of fractures, subluxations, dislocations or radiopaque bodies noted. Comparison: 3/8/2022    Impression: Left knee moderate degenerative changes as described above     Assessment:   42y.o. year old female with left knee osteoarthritis left knee medial meniscus tear  Plan: Today, we described the etiology, natural history of left knee osteoarthritis and her left knee medial meniscus tear. The treatment options included oral anti-inflammatories, bracing, injections, advanced imaging, activity modification, physical therapy, and surgical intervention. While going over the treatment options, the patient reluctantly stated that she would not like any surgical interventions at this time and would like to continue with nonoperative treatment. Due to their injections being very helpful in the past, the patient elected to have repeat injections to her left knee. As for the patient's right foot pain, and left Achilles pain, the patient states that she does not want any physical examination at this time, would like her injections and leave.   All questions were

## 2022-08-03 NOTE — PROGRESS NOTES
I performed a history and physical examination of the patient and discussed management with the resident. I reviewed the physician assistant/resident physician note and agree with the documented findings and plan of care. Any areas of disagreement are noted on the chart. I have personally evaluated this patient and have completed at least one if not all key elements of the E/M (history, physical exam, and MDM). Additional findings are as noted. I agree with the chief complaint, past medical history, past surgical history, allergies, medications, social and family history as documented unless otherwise noted below.      Electronically signed by Trey Overton DO on 8/3/2022 at 7:02 PM

## 2022-08-06 DIAGNOSIS — G89.29 OTHER CHRONIC PAIN: ICD-10-CM

## 2022-08-08 RX ORDER — GABAPENTIN 300 MG/1
300 CAPSULE ORAL 3 TIMES DAILY
Qty: 90 CAPSULE | Refills: 1 | Status: SHIPPED | OUTPATIENT
Start: 2022-08-08 | End: 2022-08-16 | Stop reason: SDUPTHER

## 2022-08-16 ENCOUNTER — OFFICE VISIT (OUTPATIENT)
Dept: INTERNAL MEDICINE | Age: 47
End: 2022-08-16
Payer: COMMERCIAL

## 2022-08-16 VITALS
SYSTOLIC BLOOD PRESSURE: 120 MMHG | DIASTOLIC BLOOD PRESSURE: 86 MMHG | TEMPERATURE: 97.3 F | OXYGEN SATURATION: 96 % | WEIGHT: 293 LBS | HEIGHT: 60 IN | HEART RATE: 84 BPM | BODY MASS INDEX: 57.52 KG/M2

## 2022-08-16 DIAGNOSIS — N94.6 PAINFUL MENSTRUATION: ICD-10-CM

## 2022-08-16 DIAGNOSIS — I10 ESSENTIAL HYPERTENSION: ICD-10-CM

## 2022-08-16 DIAGNOSIS — E11.9 TYPE 2 DIABETES MELLITUS WITHOUT COMPLICATION, WITHOUT LONG-TERM CURRENT USE OF INSULIN (HCC): ICD-10-CM

## 2022-08-16 DIAGNOSIS — G89.29 CHRONIC BILATERAL LOW BACK PAIN WITHOUT SCIATICA: ICD-10-CM

## 2022-08-16 DIAGNOSIS — M54.50 CHRONIC BILATERAL LOW BACK PAIN WITHOUT SCIATICA: ICD-10-CM

## 2022-08-16 DIAGNOSIS — G89.29 OTHER CHRONIC PAIN: ICD-10-CM

## 2022-08-16 DIAGNOSIS — R10.2 FEMALE PELVIC PAIN: ICD-10-CM

## 2022-08-16 PROCEDURE — 99214 OFFICE O/P EST MOD 30 MIN: CPT | Performed by: STUDENT IN AN ORGANIZED HEALTH CARE EDUCATION/TRAINING PROGRAM

## 2022-08-16 PROCEDURE — G8427 DOCREV CUR MEDS BY ELIG CLIN: HCPCS | Performed by: STUDENT IN AN ORGANIZED HEALTH CARE EDUCATION/TRAINING PROGRAM

## 2022-08-16 PROCEDURE — 4004F PT TOBACCO SCREEN RCVD TLK: CPT | Performed by: STUDENT IN AN ORGANIZED HEALTH CARE EDUCATION/TRAINING PROGRAM

## 2022-08-16 PROCEDURE — 3044F HG A1C LEVEL LT 7.0%: CPT | Performed by: STUDENT IN AN ORGANIZED HEALTH CARE EDUCATION/TRAINING PROGRAM

## 2022-08-16 PROCEDURE — 2022F DILAT RTA XM EVC RTNOPTHY: CPT | Performed by: STUDENT IN AN ORGANIZED HEALTH CARE EDUCATION/TRAINING PROGRAM

## 2022-08-16 PROCEDURE — G8417 CALC BMI ABV UP PARAM F/U: HCPCS | Performed by: STUDENT IN AN ORGANIZED HEALTH CARE EDUCATION/TRAINING PROGRAM

## 2022-08-16 PROCEDURE — 99211 OFF/OP EST MAY X REQ PHY/QHP: CPT | Performed by: STUDENT IN AN ORGANIZED HEALTH CARE EDUCATION/TRAINING PROGRAM

## 2022-08-16 RX ORDER — GABAPENTIN 300 MG/1
300 CAPSULE ORAL 3 TIMES DAILY
Qty: 90 CAPSULE | Refills: 1 | Status: SHIPPED | OUTPATIENT
Start: 2022-08-16 | End: 2022-09-15

## 2022-08-16 RX ORDER — NAPROXEN 500 MG/1
500 TABLET ORAL 2 TIMES DAILY PRN
Qty: 60 TABLET | Refills: 0 | Status: SHIPPED | OUTPATIENT
Start: 2022-08-16 | End: 2022-11-01 | Stop reason: SDUPTHER

## 2022-08-16 RX ORDER — TIZANIDINE 4 MG/1
4 TABLET ORAL EVERY 8 HOURS PRN
Qty: 90 TABLET | Refills: 0 | Status: SHIPPED | OUTPATIENT
Start: 2022-08-16

## 2022-08-16 RX ORDER — AMLODIPINE BESYLATE 10 MG/1
10 TABLET ORAL DAILY
Qty: 30 TABLET | Refills: 3 | Status: SHIPPED | OUTPATIENT
Start: 2022-08-16

## 2022-08-16 SDOH — ECONOMIC STABILITY: FOOD INSECURITY: WITHIN THE PAST 12 MONTHS, THE FOOD YOU BOUGHT JUST DIDN'T LAST AND YOU DIDN'T HAVE MONEY TO GET MORE.: NEVER TRUE

## 2022-08-16 SDOH — ECONOMIC STABILITY: FOOD INSECURITY: WITHIN THE PAST 12 MONTHS, YOU WORRIED THAT YOUR FOOD WOULD RUN OUT BEFORE YOU GOT MONEY TO BUY MORE.: NEVER TRUE

## 2022-08-16 ASSESSMENT — ENCOUNTER SYMPTOMS
VOMITING: 0
ABDOMINAL PAIN: 0
CONSTIPATION: 0
COUGH: 0
BACK PAIN: 0
CHEST TIGHTNESS: 0
WHEEZING: 0
BLOOD IN STOOL: 0
NAUSEA: 0
SHORTNESS OF BREATH: 0
DIARRHEA: 0

## 2022-08-16 ASSESSMENT — SOCIAL DETERMINANTS OF HEALTH (SDOH): HOW HARD IS IT FOR YOU TO PAY FOR THE VERY BASICS LIKE FOOD, HOUSING, MEDICAL CARE, AND HEATING?: SOMEWHAT HARD

## 2022-08-16 NOTE — LETTER
Date: 22    Adelfo Buckley  [facility]  [facility] [address] [address]  Dept: [phone]    Re: Jerri Ajithas  : 1975    To whom it may concern: The above named patient has been seen by our office for 3 years. She suffers from the following comorbidities: Obesity with BMI greater than 50, hypertension, prediabetes, sleep apnea, chronic low back pain without sciatica, meniscal tear in the knees. Her current weight is 3.4 pounds and BMI of 61.40. I feel this patient would benefit from weight loss surgery because she has been on successful losing weight with other diet methods and her medical conditions will become life-threatening if she does not get help getting her weight under control. I would appreciate your concentration for approval.  Please feel free to contact me for any further information.     Sincerely

## 2022-08-16 NOTE — PROGRESS NOTES
[facility] [address] [address]  Dept: [phone]  Dept Fax: [phone]    Office Progress/Follow Up Note  Date ofpatient's visit: 8/16/2022  Patient's Name:  Obed Isaacs YOB: 1975            Patient Care Team:  Dianne Ryder MD as PCP - General (Internal Medicine)  ================================================================    REASON FOR VISIT/CHIEF COMPLAINT:  Hypertension (Pt did not take medication today, discuss inhaler), Health Maintenance (Pt states she going to schedule appt soon for a pap smear, pt states she had her diabetic eye exam [facility]), and Forms (For weight loss )    HISTORY OF PRESENTING ILLNESS:  History was obtained from: patient, electronic medical record. Andria tanner 42 y.o. is here for a follow-up visit. Patient is very tearful, in distress and borderline suicidal.  Patient states she has had acute stressors since April of this year when she lost her housing, is not able to work does not have any income. Patient states that she is homeless and does not get any help from her kids and wants to die. Patient states that she thought about ending her life in April when the acute stressor happened, currently does not have those thoughts or plans. Essential hypertension: Controlled. Blood pressure 120/86. Patient compliant with her Norvasc. Denies any complaints of blurring of vision, headache, chest pain, shortness of breath. Obesity with BMI 61.40: Patient for bariatric surgery and has been undergoing presurgical work-up. Went to pulmonologist to get her PFTs and sleep study which showed patient has sleep apnea. Patient prescribed CPAP and has been using it. Prediabetes: Last A1c 6.2. Patient currently taking metformin 500 milligrams daily. Meniscal tear and left knee: Patient follows up with orthopedics.   Was advised surgery but patient does not want any surgical intervention. Patient was given steroid injections for left knee patient wants to continue with same         Patient Active Problem List   Diagnosis    Hyperlipidemia    Hypertension    Diabetes mellitus ([facility] [address]    GERD (gastroesophageal reflux disease)    Obesity, morbid, BMI 50 or higher (HCC)    Hidradenitis suppurativa    DALILA (obstructive sleep apnea)    Perirectal abscess    Iron deficiency anemia    Dysmenorrhea    Menorrhagia    Acute cystitis without hematuria    Abnormal uterine bleeding (AUB)    Hysteroscopy, D&C, Myosure, Mirena IUD Insertion 11/18/19    Retained intrauterine contraceptive device (IUD)    Localized osteoarthritis of left knee       Health Maintenance Due   Topic Date Due    Pneumococcal 0-64 years Vaccine (1 - PCV) Never done    Hepatitis B vaccine (1 of 3 - Risk 3-dose series) Never done    COVID-19 Vaccine (3 - Booster for Pfizer series) 01/17/2022    Cervical cancer screen  04/07/2022    Diabetic retinal exam  07/06/2022       No Known Allergies      Current Outpatient Medications   Medication Sig Dispense Refill    gabapentin (NEURONTIN) 300 MG capsule Take 1 capsule by mouth in the morning and 1 capsule at noon and 1 capsule before bedtime. Do all this for 30 days. 90 capsule 1    naproxen (NAPROSYN) 500 MG tablet Take 1 tablet by mouth 2 times daily as needed for Pain 60 tablet 0    tiZANidine (ZANAFLEX) 4 MG tablet Take 1 tablet by mouth every 8 hours as needed (muscle spasms/pain) 90 tablet 0    metFORMIN (GLUCOPHAGE) 500 MG tablet take 1 tablet by mouth once daily with breakfast 60 tablet 3    amLODIPine (NORVASC) 10 MG tablet Take 1 tablet by mouth in the morning. 30 tablet 3    sertraline (ZOLOFT) 50 MG tablet Take 1 tablet by mouth in the morning.  30 tablet 0    benzoyl peroxide 5 % external liquid Wash face, chest and back 1-2 times daily 227 g 3    clindamycin (CLEOCIN T) 1 % lotion Apply to affected areas daily 60 mL 3    acetaminophen (ACETAMINOPHEN EXTRA STRENGTH) 500 MG tablet Take 1 tablet by mouth every 6 hours as needed for Pain 120 tablet 3    doxycycline hyclate (VIBRAMYCIN) 100 MG capsule take 1 capsule by mouth twice a day with food 60 capsule 3     Current Facility-Administered Medications   Medication Dose Route Frequency Provider Last Rate Last Admin    methylPREDNISolone acetate (DEPO-MEDROL) injection 80 mg  80 mg Intra-artICUlar Once Finesse Ellis,         lidocaine 1 % injection 2 mL  2 mL Intra-artICUlar Once Kimani Colbert, DO           Social History     Tobacco Use    Smoking status: Every Day     Packs/day: 0.50     Years: 30.00     Pack years: 15.00     Types: Cigarettes    Smokeless tobacco: Never   Vaping Use    Vaping Use: Never used   Substance Use Topics    Alcohol use: Not Currently     Comment: Occasionally    Drug use: Yes     Frequency: 1.5 times per week     Types: Marijuana (Weed)     Comment: 4 x a month       Family History   Problem Relation Age of Onset    Vision Loss Mother     Glaucoma Mother     Diabetes Father     High Blood Pressure Father     Glaucoma Father     Vision Loss Father     Kidney Cancer Maternal Grandmother     Kidney stones Brother     No Known Problems Maternal Grandfather     Diabetes Paternal Grandmother     Other Brother         stomach problem        REVIEW OF SYSTEMS:  Review of Systems   Constitutional:  Negative for activity change, appetite change, fever and unexpected weight change. HENT:  Negative for congestion. Respiratory:  Negative for cough, chest tightness, shortness of breath and wheezing. Cardiovascular:  Negative for chest pain, palpitations and leg swelling. Gastrointestinal:  Negative for abdominal pain, blood in stool, constipation, diarrhea, nausea and vomiting. Genitourinary:  Negative for difficulty urinating. Musculoskeletal:  Negative for arthralgias and back pain. Neurological:  Negative for dizziness, light-headedness and headaches.    Psychiatric/Behavioral:  Negative for behavioral problems, confusion, decreased concentration, hallucinations and self-injury. The patient is nervous/anxious. The patient is not hyperactive. No acute distress, tearful, crying, depressed     PHYSICAL EXAM:  Vitals:    08/16/22 1411   BP: 120/86   Site: Right Upper Arm   Position: Sitting   Cuff Size: Large Adult   Pulse: 84   Temp: 97.3 °F (36.3 °C)   SpO2: 96%   Weight: (!) 314 lb 6.4 oz (142.6 kg)   Height: 5' (1.524 m)     BP Readings from Last 3 Encounters:   08/16/22 120/86   05/26/22 126/76   05/17/22 115/76        Physical Exam  Constitutional:       General: She is in acute distress. Appearance: Normal appearance. She is obese. HENT:      Head: Normocephalic and atraumatic. Cardiovascular:      Rate and Rhythm: Normal rate and regular rhythm. Pulses: Normal pulses. Heart sounds: Normal heart sounds. No murmur heard. Pulmonary:      Effort: Pulmonary effort is normal. No respiratory distress. Breath sounds: Normal breath sounds. Abdominal:      General: There is no distension. Palpations: Abdomen is soft. Musculoskeletal:         General: No swelling or tenderness. Right lower leg: No edema. Left lower leg: No edema. Neurological:      Mental Status: She is alert and oriented to person, place, and time. Sensory: No sensory deficit.    Psychiatric:         Behavior: Behavior normal.      Comments: Depressed mood, tearful         DIAGNOSTIC FINDINGS:  CBC:  Lab Results   Component Value Date/Time    WBC 10.1 09/07/2021 03:11 PM    HGB 12.8 09/07/2021 03:11 PM     09/07/2021 03:11 PM       BMP:    Lab Results   Component Value Date/Time     07/12/2021 12:10 PM    K 3.9 07/12/2021 12:10 PM     07/12/2021 12:10 PM    CO2 25 07/12/2021 12:10 PM    BUN 10 07/12/2021 12:10 PM    CREATININE 0.38 07/12/2021 12:10 PM    GLUCOSE 107 07/12/2021 12:10 PM       HEMOGLOBIN A1C:   Lab Results   Component Value Date/Time    LABA1C 6.2 05/17/2022 02:30 PM       FASTING LIPID PANEL:  Lab Results   Component Value Date    CHOL 149 03/01/2021    HDL 48 05/23/2022    TRIG 118 03/01/2021       ASSESSMENT AND PLAN:  Gomez Chakraborty was seen today for hypertension, health maintenance and forms. Diagnoses and all orders for this visit:    Acute stress disorder  -Patient in acute distress from losing housing in April. Is worried that she is going to become homeless and wound money to pay for bariatric surgery or other treatments. Strength to continue to help. We will continue current treatment we will see the patient back in 4 weeks. Patient offered admission to the hospital with psych evaluation and  for placement but patient does not want to go to the hospital.  Patient states that she is actively not suicidal and would like to go back home. Essential hypertension  -     amLODIPine (NORVASC) 10 MG tablet; Take 1 tablet by mouth in the morning. Other chronic pain  -     gabapentin (NEURONTIN) 300 MG capsule; Take 1 capsule by mouth in the morning and 1 capsule at noon and 1 capsule before bedtime. Do all this for 30 days. Female pelvic pain  -     naproxen (NAPROSYN) 500 MG tablet; Take 1 tablet by mouth 2 times daily as needed for Pain    Painful menstruation  -     naproxen (NAPROSYN) 500 MG tablet; Take 1 tablet by mouth 2 times daily as needed for Pain    Chronic bilateral low back pain without sciatica  -     tiZANidine (ZANAFLEX) 4 MG tablet; Take 1 tablet by mouth every 8 hours as needed (muscle spasms/pain)    Type 2 diabetes mellitus without complication, without long-term current use of insulin (HCC)  -     metFORMIN (GLUCOPHAGE) 500 MG tablet; take 1 tablet by mouth once daily with breakfast    Other orders  -     sertraline (ZOLOFT) 50 MG tablet; Take 1 tablet by mouth in the morning. FOLLOW UP AND INSTRUCTIONS:  No follow-ups on file.     Gomez Chakraborty received counseling on the following healthy behaviors: nutrition, exercise, and medication adherence    Discussed use, benefit, and side effects of prescribed medications. Barriers to medication compliance addressed. All patient questions answered. Pt voiced understanding. Patient given educational materials - see patient instructions        Michael Arevalo MD  Internal Medicine Resident, PGY-3  [address]  8/16/2022,5:01 PM

## 2022-09-12 NOTE — TELEPHONE ENCOUNTER
E-scribe request from [employer] for Sertraline 50 mg. Patient has an appt on 10/4/22. Health Maintenance   Topic Date Due    Pneumococcal 0-64 years Vaccine (1 - PCV) Never done    Hepatitis B vaccine (1 of 3 - Risk 3-dose series) Never done    COVID-19 Vaccine (3 - Booster for Pfizer series) 01/17/2022    Cervical cancer screen  04/07/2022    Flu vaccine (1) Never done    Diabetic microalbuminuria test  09/29/2022    Depression Monitoring  03/31/2023    Diabetic foot exam  05/17/2023    A1C test (Diabetic or Prediabetic)  05/17/2023    Lipids  05/23/2023    Diabetic retinal exam  07/11/2023    DTaP/Tdap/Td vaccine (2 - Td or Tdap) 09/24/2031    Hepatitis C screen  Completed    HIV screen  Completed    Hepatitis A vaccine  Aged Out    Hib vaccine  Aged Out    Meningococcal (ACWY) vaccine  Aged Out             (applicable per patient's age: Cancer Screenings, Depression Screening, Fall Risk Screening, Immunizations)    Hemoglobin A1C (%)   Date Value   05/17/2022 6.2   09/24/2021 6.3   03/01/2021 6.4     Microalb/Crt.  Ratio (mcg/mg creat)   Date Value   09/29/2021 8     LDL Cholesterol (mg/dL)   Date Value   05/23/2022 88     AST (U/L)   Date Value   05/11/2018 11     ALT (U/L)   Date Value   05/11/2021 12     BUN (mg/dL)   Date Value   07/12/2021 10      (goal A1C is < 7)   (goal LDL is <100) need 30-50% reduction from baseline     BP Readings from Last 3 Encounters:   08/16/22 120/86   05/26/22 126/76   05/17/22 115/76    (goal /80)      All Future Testing planned in [facility]:  Lab Frequency Next Occurrence   ESTEBAN DIGITAL SCREEN W OR WO CAD BILATERAL Once 11/09/2022       Next Visit Date:  Future Appointments   Date Time Provider [place]   10/4/2022  1:00 PM Hang Marie MD [email or web address]            Patient Active Problem List:     Hyperlipidemia     Hypertension     Diabetes mellitus ([facility] [address]     GERD (gastroesophageal reflux disease)     Obesity, morbid, BMI 50 or higher (HCC)     Hidradenitis

## 2022-09-13 ENCOUNTER — TELEPHONE (OUTPATIENT)
Dept: INTERNAL MEDICINE | Age: 47
End: 2022-09-13

## 2022-10-04 ENCOUNTER — HOSPITAL ENCOUNTER (OUTPATIENT)
Age: 47
Setting detail: SPECIMEN
Discharge: HOME | End: 2022-10-04

## 2022-10-04 ENCOUNTER — OFFICE VISIT (OUTPATIENT)
Dept: INTERNAL MEDICINE | Age: 47
End: 2022-10-04
Payer: COMMERCIAL

## 2022-10-04 VITALS
SYSTOLIC BLOOD PRESSURE: 132 MMHG | WEIGHT: 293 LBS | OXYGEN SATURATION: 98 % | HEIGHT: 60 IN | TEMPERATURE: 98.3 F | BODY MASS INDEX: 57.52 KG/M2 | HEART RATE: 87 BPM | DIASTOLIC BLOOD PRESSURE: 77 MMHG

## 2022-10-04 DIAGNOSIS — Z13.31 POSITIVE DEPRESSION SCREENING: ICD-10-CM

## 2022-10-04 DIAGNOSIS — G47.33 OSA (OBSTRUCTIVE SLEEP APNEA): ICD-10-CM

## 2022-10-04 DIAGNOSIS — K21.9 GASTROESOPHAGEAL REFLUX DISEASE WITHOUT ESOPHAGITIS: Chronic | ICD-10-CM

## 2022-10-04 DIAGNOSIS — E66.01 OBESITY, MORBID, BMI 50 OR HIGHER (HCC): ICD-10-CM

## 2022-10-04 DIAGNOSIS — I10 HYPERTENSION, UNSPECIFIED TYPE: Chronic | ICD-10-CM

## 2022-10-04 DIAGNOSIS — L73.2 HIDRADENITIS SUPPURATIVA: ICD-10-CM

## 2022-10-04 DIAGNOSIS — E11.9 TYPE 2 DIABETES MELLITUS WITHOUT COMPLICATION, WITHOUT LONG-TERM CURRENT USE OF INSULIN (HCC): Primary | ICD-10-CM

## 2022-10-04 DIAGNOSIS — E11.9 TYPE 2 DIABETES MELLITUS WITHOUT COMPLICATION, WITHOUT LONG-TERM CURRENT USE OF INSULIN (HCC): ICD-10-CM

## 2022-10-04 LAB
ABSOLUTE EOS #: 0.28 K/UL (ref 0–0.44)
ABSOLUTE IMMATURE GRANULOCYTE: 0.03 K/UL (ref 0–0.3)
ABSOLUTE LYMPH #: 2.61 K/UL (ref 1.1–3.7)
ABSOLUTE MONO #: 0.59 K/UL (ref 0.1–1.2)
ANION GAP SERPL CALCULATED.3IONS-SCNC: 14 MMOL/L (ref 9–17)
BASOPHILS # BLD: 1 % (ref 0–2)
BASOPHILS ABSOLUTE: 0.05 K/UL (ref 0–0.2)
BUN BLDV-MCNC: 11 MG/DL (ref 6–20)
CALCIUM SERPL-MCNC: 9 MG/DL (ref 8.6–10.4)
CHLORIDE BLD-SCNC: 100 MMOL/L (ref 98–107)
CO2: 24 MMOL/L (ref 20–31)
CREAT SERPL-MCNC: 0.44 MG/DL (ref 0.5–0.9)
CREATININE URINE: 93.1 MG/DL (ref 28–217)
EOSINOPHILS RELATIVE PERCENT: 3 % (ref 1–4)
GFR SERPL CREATININE-BSD FRML MDRD: >60 ML/MIN/1.73M2
GLUCOSE BLD-MCNC: 97 MG/DL (ref 70–99)
HBA1C MFR BLD: 6.8 %
HCT VFR BLD CALC: 37.7 % (ref 36.3–47.1)
HEMOGLOBIN: 12.1 G/DL (ref 11.9–15.1)
IMMATURE GRANULOCYTES: 0 %
LYMPHOCYTES # BLD: 29 % (ref 24–43)
MCH RBC QN AUTO: 29.7 PG (ref 25.2–33.5)
MCHC RBC AUTO-ENTMCNC: 32.1 G/DL (ref 28.4–34.8)
MCV RBC AUTO: 92.6 FL (ref 82.6–102.9)
MICROALBUMIN/CREAT 24H UR: <12 MG/L
MICROALBUMIN/CREAT UR-RTO: NORMAL MCG/MG CREAT
MONOCYTES # BLD: 6 % (ref 3–12)
NRBC AUTOMATED: 0 PER 100 WBC
PDW BLD-RTO: 14.5 % (ref 11.8–14.4)
PLATELET # BLD: 253 K/UL (ref 138–453)
PMV BLD AUTO: 12.5 FL (ref 8.1–13.5)
POTASSIUM SERPL-SCNC: 3.8 MMOL/L (ref 3.7–5.3)
RBC # BLD: 4.07 M/UL (ref 3.95–5.11)
RBC # BLD: ABNORMAL 10*6/UL
SEG NEUTROPHILS: 61 % (ref 36–65)
SEGMENTED NEUTROPHILS ABSOLUTE COUNT: 5.59 K/UL (ref 1.5–8.1)
SODIUM BLD-SCNC: 138 MMOL/L (ref 135–144)
WBC # BLD: 9.2 K/UL (ref 3.5–11.3)

## 2022-10-04 PROCEDURE — 83036 HEMOGLOBIN GLYCOSYLATED A1C: CPT | Performed by: STUDENT IN AN ORGANIZED HEALTH CARE EDUCATION/TRAINING PROGRAM

## 2022-10-04 PROCEDURE — 99214 OFFICE O/P EST MOD 30 MIN: CPT | Performed by: STUDENT IN AN ORGANIZED HEALTH CARE EDUCATION/TRAINING PROGRAM

## 2022-10-04 PROCEDURE — 3044F HG A1C LEVEL LT 7.0%: CPT | Performed by: STUDENT IN AN ORGANIZED HEALTH CARE EDUCATION/TRAINING PROGRAM

## 2022-10-04 PROCEDURE — 99211 OFF/OP EST MAY X REQ PHY/QHP: CPT | Performed by: STUDENT IN AN ORGANIZED HEALTH CARE EDUCATION/TRAINING PROGRAM

## 2022-10-04 RX ORDER — CLINDAMYCIN PHOSPHATE 10 UG/ML
LOTION TOPICAL
Qty: 60 ML | Refills: 3 | Status: SHIPPED | OUTPATIENT
Start: 2022-10-04

## 2022-10-04 ASSESSMENT — PATIENT HEALTH QUESTIONNAIRE - PHQ9
10. IF YOU CHECKED OFF ANY PROBLEMS, HOW DIFFICULT HAVE THESE PROBLEMS MADE IT FOR YOU TO DO YOUR WORK, TAKE CARE OF THINGS AT HOME, OR GET ALONG WITH OTHER PEOPLE: 0
SUM OF ALL RESPONSES TO PHQ9 QUESTIONS 1 & 2: 0
7. TROUBLE CONCENTRATING ON THINGS, SUCH AS READING THE NEWSPAPER OR WATCHING TELEVISION: 0
3. TROUBLE FALLING OR STAYING ASLEEP: 0
SUM OF ALL RESPONSES TO PHQ QUESTIONS 1-9: 1
5. POOR APPETITE OR OVEREATING: 0
SUM OF ALL RESPONSES TO PHQ QUESTIONS 1-9: 1
8. MOVING OR SPEAKING SO SLOWLY THAT OTHER PEOPLE COULD HAVE NOTICED. OR THE OPPOSITE, BEING SO FIGETY OR RESTLESS THAT YOU HAVE BEEN MOVING AROUND A LOT MORE THAN USUAL: 0
SUM OF ALL RESPONSES TO PHQ QUESTIONS 1-9: 1
4. FEELING TIRED OR HAVING LITTLE ENERGY: 1
2. FEELING DOWN, DEPRESSED OR HOPELESS: 0
9. THOUGHTS THAT YOU WOULD BE BETTER OFF DEAD, OR OF HURTING YOURSELF: 0
6. FEELING BAD ABOUT YOURSELF - OR THAT YOU ARE A FAILURE OR HAVE LET YOURSELF OR YOUR FAMILY DOWN: 0
1. LITTLE INTEREST OR PLEASURE IN DOING THINGS: 0
SUM OF ALL RESPONSES TO PHQ QUESTIONS 1-9: 1

## 2022-10-04 ASSESSMENT — ENCOUNTER SYMPTOMS
CHEST TIGHTNESS: 0
DIARRHEA: 0
BLOOD IN STOOL: 0
ABDOMINAL PAIN: 0
WHEEZING: 0
ABDOMINAL DISTENTION: 0
COUGH: 0
SHORTNESS OF BREATH: 0
NAUSEA: 0
CHOKING: 0
CONSTIPATION: 0

## 2022-10-04 NOTE — PROGRESS NOTES
Attending Physician Statement  [facility]  including pertinent history and exam findings,  with the resident. I have reviewed the key elements of all parts of the encounter with the resident. I agree with the assessment, plan and orders as documented by the resident.     Sherrian Felty, MD  10/4/2022

## 2022-10-04 NOTE — PROGRESS NOTES
[facility] [address] [address]  Dept: [phone]  Dept Fax: [phone]    Office Progress/Follow Up Note  Date ofpatient's visit: 10/4/2022  Patient's Name:  Abhishek Sosa YOB: 1975            Patient Care Team:  Ok Mayo MD as PCP - General (Internal Medicine)  ================================================================    REASON FOR VISIT/CHIEF COMPLAINT:  Hypertension (Follow up ), Weight Loss, and Health Maintenance (Due for pap, pt declined vaccines )    HISTORY OF PRESENTING ILLNESS:  History was obtained from: patient, electronic medical record. Livier tanner 42 y.o. is here for a follow-up visit. Acute stress disorder: Patient lost her housing when was seen during the last visit and was in acute distress. Patient refused saying psych or  and was prescribed Zoloft. Patient states that she took Zoloft for 3 days and trashed the rest of the medication does not want to take as she thinks they will make her crazy. Patient's current mood is a lot better and has had support from her family. Hypertension: Well-controlled. Patient currently taking Norvasc 10 mg daily. Patient be compliant with her medications. Patient denies any complaints of chest pain, headaches, blurring of vision, palpitations or chest pain. Morbid obesity with BMI of more than 50: Patient currently being worked by Dr. Marcus Client with bariatric surgery.  -Patient followed up with pulmonologist and got her PFTs done and was found to have DALILA and was prescribed CPAP which patient has been using  -Patient followed up with her cardiologist 1. Pending results  -Patient is due to follow-up with her GI, psychological evaluation and nutrition services.  -Patient still wants to try medications in the meantime she is being evaluated for.   Expiratory    Abnormal uterine bleeding: Patient states that she has bleeding but it coincides with her menses. Patient states that she will make up follow-up appoint with OB/GYN. Hidradenitis suppurativa: States that she had a flareup under her left breast and has been using her medications. Patient Active Problem List   Diagnosis    Hyperlipidemia    Hypertension    Diabetes mellitus (HCC)    GERD (gastroesophageal reflux disease)    Obesity, morbid, BMI 50 or higher (HCC)    Hidradenitis suppurativa    DALILA (obstructive sleep apnea)    Perirectal abscess    Iron deficiency anemia    Dysmenorrhea    Menorrhagia    Acute cystitis without hematuria    Abnormal uterine bleeding (AUB)    Hysteroscopy, D&C, Myosure, Mirena IUD Insertion 11/18/19    Retained intrauterine contraceptive device (IUD)    Localized osteoarthritis of left knee       Health Maintenance Due   Topic Date Due    Pneumococcal 0-64 years Vaccine (1 - PCV) Never done    COVID-19 Vaccine (3 - Booster for Pfizer series) 01/17/2022    Cervical cancer screen  04/07/2022    Flu vaccine (1) Never done    Diabetic microalbuminuria test  09/29/2022       No Known Allergies      Current Outpatient Medications   Medication Sig Dispense Refill    clindamycin (CLEOCIN T) 1 % lotion Apply to affected areas daily 60 mL 3    benzoyl peroxide 5 % external liquid Wash face, chest and back 1-2 times daily 227 g 3    Semaglutide,0.25 or 0.5MG/DOS, 2 MG/1.5ML SOPN Inject 0.25 mg into the skin once a week 1.5 mL 1    naproxen (NAPROSYN) 500 MG tablet Take 1 tablet by mouth 2 times daily as needed for Pain 60 tablet 0    tiZANidine (ZANAFLEX) 4 MG tablet Take 1 tablet by mouth every 8 hours as needed (muscle spasms/pain) 90 tablet 0    metFORMIN (GLUCOPHAGE) 500 MG tablet take 1 tablet by mouth once daily with breakfast 60 tablet 3    amLODIPine (NORVASC) 10 MG tablet Take 1 tablet by mouth in the morning.  30 tablet 3    acetaminophen (ACETAMINOPHEN EXTRA STRENGTH) 500 MG tablet Take 1 tablet by mouth every 6 hours as needed for Pain 120 tablet 3 doxycycline hyclate (VIBRAMYCIN) 100 MG capsule take 1 capsule by mouth twice a day with food 60 capsule 3    gabapentin (NEURONTIN) 300 MG capsule Take 1 capsule by mouth in the morning and 1 capsule at noon and 1 capsule before bedtime. Do all this for 30 days. 90 capsule 1     Current Facility-Administered Medications   Medication Dose Route Frequency Provider Last Rate Last Admin    methylPREDNISolone acetate (DEPO-MEDROL) injection 80 mg  80 mg Intra-artICUlar Once Finesse Ellis DO        lidocaine 1 % injection 2 mL  2 mL Intra-artICUlar Once Ewelina Srivastava, DO           Social History     Tobacco Use    Smoking status: Every Day     Packs/day: 0.50     Years: 30.00     Pack years: 15.00     Types: Cigarettes    Smokeless tobacco: Never   Vaping Use    Vaping Use: Never used   Substance Use Topics    Alcohol use: Not Currently     Comment: Occasionally    Drug use: Yes     Frequency: 1.5 times per week     Types: Marijuana (Weed)     Comment: 4 x a month       Family History   Problem Relation Age of Onset    Vision Loss Mother     Glaucoma Mother     Diabetes Father     High Blood Pressure Father     Glaucoma Father     Vision Loss Father     Kidney Cancer Maternal Grandmother     Kidney stones Brother     No Known Problems Maternal Grandfather     Diabetes Paternal Grandmother     Other Brother         stomach problem        REVIEW OF SYSTEMS:  Review of Systems   Constitutional:  Negative for activity change, appetite change and fever. Respiratory:  Negative for cough, choking, chest tightness, shortness of breath and wheezing. Cardiovascular:  Negative for chest pain and leg swelling. Gastrointestinal:  Negative for abdominal distention, abdominal pain, blood in stool, constipation, diarrhea and nausea. Genitourinary:  Positive for vaginal bleeding. Neurological:  Negative for dizziness, light-headedness and headaches.    Psychiatric/Behavioral:  Negative for agitation, confusion, decreased concentration and dysphoric mood. PHYSICAL EXAM:  Vitals:    10/04/22 1303   BP: 132/77   Site: Right Lower Arm   Position: Sitting   Cuff Size: Large Adult   Pulse: 87   Temp: 98.3 °F (36.8 °C)   TempSrc: Temporal   SpO2: 98%   Weight: (!) 314 lb 3.2 oz (142.5 kg)   Height: 5' (1.524 m)     BP Readings from Last 3 Encounters:   10/04/22 132/77   08/16/22 120/86   05/26/22 126/76        Physical Exam  Constitutional:       General: She is not in acute distress. Appearance: She is obese. HENT:      Head: Normocephalic and atraumatic. Cardiovascular:      Rate and Rhythm: Normal rate and regular rhythm. Heart sounds: Normal heart sounds. No murmur heard. Pulmonary:      Effort: No respiratory distress. Breath sounds: Normal breath sounds. No wheezing. Abdominal:      General: Abdomen is flat. Bowel sounds are normal.      Palpations: Abdomen is soft. Musculoskeletal:      Right lower leg: No edema. Left lower leg: No edema. Neurological:      Mental Status: She is alert and oriented to person, place, and time. DIAGNOSTIC FINDINGS:  CBC:  Lab Results   Component Value Date/Time    WBC 10.1 09/07/2021 03:11 PM    HGB 12.8 09/07/2021 03:11 PM     09/07/2021 03:11 PM       BMP:    Lab Results   Component Value Date/Time     07/12/2021 12:10 PM    K 3.9 07/12/2021 12:10 PM     07/12/2021 12:10 PM    CO2 25 07/12/2021 12:10 PM    BUN 10 07/12/2021 12:10 PM    CREATININE 0.38 07/12/2021 12:10 PM    GLUCOSE 107 07/12/2021 12:10 PM       HEMOGLOBIN A1C:   Lab Results   Component Value Date/Time    LABA1C 6.8 10/04/2022 01:12 PM       FASTING LIPID PANEL:  Lab Results   Component Value Date    CHOL 149 03/01/2021    HDL 48 05/23/2022    TRIG 118 03/01/2021       ASSESSMENT AND PLAN:  Jailene was seen today for hypertension, weight loss and health maintenance.     Diagnoses and all orders for this visit:    Type 2 diabetes mellitus without complication, without long-term current use of insulin (HCC)  -     Microalbumin, Ur; Future  -     POCT glycosylated hemoglobin (Hb A1C)  -     CBC with Auto Differential; Future  -     Basic Metabolic Panel; Future  - Continue metformin 500 mg daily    Obesity, morbid, BMI 50 or higher (HCC)  -     CBC with Auto Differential; Future  -     Basic Metabolic Panel; Future  -     Semaglutide,0.25 or 0.5MG/DOS, 2 MG/1.5ML SOPN; Inject 0.25 mg into the skin once a week  - Follow-up with Dr. Baldemar Brennan with bariatric surgery. In the meantime discussed with patient and will prescribe semaglutide for weight loss. Hypertension, unspecified type  -     CBC with Auto Differential; Future  - Continue taking Norvasc 10 mg daily    Hidradenitis suppurativa  -     clindamycin (CLEOCIN T) 1 % lotion; Apply to affected areas daily  -     benzoyl peroxide 5 % external liquid; Wash face, chest and back 1-2 times daily    Gastroesophageal reflux disease without esophagitis  -     CBC with Auto Differential; Future    DALILA (obstructive sleep apnea)  - Continue using CPAP daily    Positive depression screening    FOLLOW UP AND INSTRUCTIONS:  Return in about 4 weeks (around 11/1/2022) for Ozempic Up-titration, Weight Loss . Oniel Adhikari received counseling on the following healthy behaviors: nutrition, exercise, and medication adherence    Discussed use, benefit, and side effects of prescribed medications. Barriers to medication compliance addressed. All patient questions answered. Pt voiced understanding. Patient given educational materials - see patient instructions      Mj Gold MD  Internal Medicine Resident, PGY-3  [address]  10/4/2022,3:13 PM

## 2022-10-04 NOTE — PROGRESS NOTES
HYPERTENSION visit     BP Readings from Last 3 Encounters:   10/04/22 132/77   08/16/22 120/86   05/26/22 126/76       LDL Cholesterol (mg/dL)   Date Value   05/23/2022 88     HDL (mg/dL)   Date Value   05/23/2022 48     BUN (mg/dL)   Date Value   07/12/2021 10     Creatinine (mg/dL)   Date Value   07/12/2021 0.38 (L)     Glucose (mg/dL)   Date Value   07/12/2021 107 (H)              Have you changed or started any medications since your last visit including any over-the-counter medicines, vitamins, or herbal medicines? no   Have you stopped taking any of your medications? Is so, why? -  no  Are you having any side effects from any of your medications? - no  How often do you miss doses of your medication? occasional      Have you seen any other physician or provider since your last visit?  no   Have you had any other diagnostic tests since your last visit?  no   Have you been seen in the emergency room and/or had an admission in a hospital since we last saw you?  no   Have you had your routine dental cleaning in the past 6 months?  no     Do you have an active MyChart account? If no, what is the barrier?   Yes    Patient Care Team:  Shilo Bear MD as PCP - General (Internal Medicine)    Medical History Review  Past Medical, Family, and Social History reviewed and does not contribute to the patient presenting condition    Health Maintenance   Topic Date Due    Pneumococcal 0-64 years Vaccine (1 - PCV) Never done    COVID-19 Vaccine (3 - Booster for Pfizer series) 01/17/2022    Cervical cancer screen  04/07/2022    Flu vaccine (1) Never done    Diabetic microalbuminuria test  09/29/2022    Depression Monitoring  03/31/2023    Diabetic foot exam  05/17/2023    A1C test (Diabetic or Prediabetic)  05/17/2023    Lipids  05/23/2023    Diabetic retinal exam  07/11/2023    DTaP/Tdap/Td vaccine (2 - Td or Tdap) 09/24/2031    Hepatitis C screen  Completed    HIV screen  Completed    Hepatitis A vaccine  Aged Out    Hepatitis B vaccine  Aged Out    Hib vaccine  Aged Out    Meningococcal (ACWY) vaccine  Aged Out

## 2022-10-25 ENCOUNTER — HOSPITAL ENCOUNTER (OUTPATIENT)
Dept: CT IMAGING | Age: 47
Discharge: HOME | End: 2022-10-27

## 2022-10-25 DIAGNOSIS — R94.39 ABNORMAL STRESS TEST: ICD-10-CM

## 2022-11-01 ENCOUNTER — OFFICE VISIT (OUTPATIENT)
Dept: INTERNAL MEDICINE | Age: 47
End: 2022-11-01
Payer: COMMERCIAL

## 2022-11-01 VITALS
HEIGHT: 60 IN | HEART RATE: 75 BPM | OXYGEN SATURATION: 97 % | TEMPERATURE: 97.9 F | DIASTOLIC BLOOD PRESSURE: 76 MMHG | SYSTOLIC BLOOD PRESSURE: 122 MMHG | WEIGHT: 293 LBS | BODY MASS INDEX: 57.52 KG/M2

## 2022-11-01 DIAGNOSIS — F43.0 ACUTE STRESS DISORDER: ICD-10-CM

## 2022-11-01 DIAGNOSIS — L73.2 HIDRADENITIS SUPPURATIVA: Chronic | ICD-10-CM

## 2022-11-01 DIAGNOSIS — G47.33 OSA (OBSTRUCTIVE SLEEP APNEA): ICD-10-CM

## 2022-11-01 DIAGNOSIS — E66.01 OBESITY, MORBID, BMI 50 OR HIGHER (HCC): Primary | Chronic | ICD-10-CM

## 2022-11-01 DIAGNOSIS — Z72.0 TOBACCO ABUSE: ICD-10-CM

## 2022-11-01 DIAGNOSIS — I10 HYPERTENSION, UNSPECIFIED TYPE: Chronic | ICD-10-CM

## 2022-11-01 DIAGNOSIS — G62.9 PERIPHERAL POLYNEUROPATHY: ICD-10-CM

## 2022-11-01 DIAGNOSIS — R10.2 FEMALE PELVIC PAIN: ICD-10-CM

## 2022-11-01 DIAGNOSIS — N94.6 PAINFUL MENSTRUATION: ICD-10-CM

## 2022-11-01 PROCEDURE — G8427 DOCREV CUR MEDS BY ELIG CLIN: HCPCS | Performed by: STUDENT IN AN ORGANIZED HEALTH CARE EDUCATION/TRAINING PROGRAM

## 2022-11-01 PROCEDURE — G8484 FLU IMMUNIZE NO ADMIN: HCPCS | Performed by: STUDENT IN AN ORGANIZED HEALTH CARE EDUCATION/TRAINING PROGRAM

## 2022-11-01 PROCEDURE — 99211 OFF/OP EST MAY X REQ PHY/QHP: CPT | Performed by: STUDENT IN AN ORGANIZED HEALTH CARE EDUCATION/TRAINING PROGRAM

## 2022-11-01 PROCEDURE — 99214 OFFICE O/P EST MOD 30 MIN: CPT | Performed by: STUDENT IN AN ORGANIZED HEALTH CARE EDUCATION/TRAINING PROGRAM

## 2022-11-01 PROCEDURE — 4004F PT TOBACCO SCREEN RCVD TLK: CPT | Performed by: STUDENT IN AN ORGANIZED HEALTH CARE EDUCATION/TRAINING PROGRAM

## 2022-11-01 PROCEDURE — G8417 CALC BMI ABV UP PARAM F/U: HCPCS | Performed by: STUDENT IN AN ORGANIZED HEALTH CARE EDUCATION/TRAINING PROGRAM

## 2022-11-01 PROCEDURE — 3078F DIAST BP <80 MM HG: CPT | Performed by: STUDENT IN AN ORGANIZED HEALTH CARE EDUCATION/TRAINING PROGRAM

## 2022-11-01 PROCEDURE — 3074F SYST BP LT 130 MM HG: CPT | Performed by: STUDENT IN AN ORGANIZED HEALTH CARE EDUCATION/TRAINING PROGRAM

## 2022-11-01 RX ORDER — BUPROPION HYDROCHLORIDE 150 MG/1
150 TABLET, EXTENDED RELEASE ORAL 2 TIMES DAILY
Qty: 60 TABLET | Refills: 3 | Status: CANCELLED | OUTPATIENT
Start: 2022-11-01

## 2022-11-01 RX ORDER — BUPROPION HYDROCHLORIDE 150 MG/1
150 TABLET ORAL EVERY MORNING
Qty: 30 TABLET | Refills: 3 | Status: SHIPPED | OUTPATIENT
Start: 2022-11-01

## 2022-11-01 RX ORDER — NAPROXEN 500 MG/1
500 TABLET ORAL 2 TIMES DAILY PRN
Qty: 60 TABLET | Refills: 0 | Status: SHIPPED | OUTPATIENT
Start: 2022-11-01

## 2022-11-01 ASSESSMENT — ENCOUNTER SYMPTOMS
ABDOMINAL PAIN: 0
WHEEZING: 0
NAUSEA: 0
DIARRHEA: 0
CONSTIPATION: 0
BLOOD IN STOOL: 0
COUGH: 0
ABDOMINAL DISTENTION: 0
SHORTNESS OF BREATH: 0
CHOKING: 0
CHEST TIGHTNESS: 0

## 2022-11-01 NOTE — PROGRESS NOTES
[facility] received a referral to schedule patient for a L2 ultrasound. [facility] has attempted to contact patient several times but have been unsuccessful.     Referring clinic notified. Removing orders.    RADHA Levin scheduling   [facility] [address] [address]  Dept: [phone]  Dept Fax: [phone]    Office Progress/Follow Up Note  Date ofpatient's visit: 11/1/2022  Patient's Name:  Karey Huntley YOB: 1975            Patient Care Team:  Bud Mack MD as PCP - General (Internal Medicine)  ================================================================    REASON FOR VISIT/CHIEF COMPLAINT:  1 Month Follow-Up and Numbness (Hands and right lower hip )    HISTORY OF PRESENTING ILLNESS:  History was obtained from: patient, electronic medical record. Ladd Duane a 42 y.o. is here for a follow-up visit. Hypertension: Well-controlled. Patient currently taking Norvasc 10 mg daily. Patient be compliant with her medications. Patient denies any complaints of chest pain, headaches, blurring of vision, palpitations or chest pain. Morbid obesity with BMI of more than 50: Patient currently being worked by Dr. Stephanie Dubois with bariatric surgery.  -Patient followed up with pulmonologist and got her PFTs done and was found to have DALILA and was prescribed CPAP which patient has been using  -Patient followed up with her cardiologist 1. Pending results  -Patient is due to follow-up with her GI, psychological evaluation and nutrition services.  -Patient still wants to try medications in the meantime she is being evaluated for surgery. Patient did not get her Ozempic filled from the pharmacy. Patient is in distress and crying because Ozempic was not refilled and has only lost 1 pound from the last visit. Peripheral neuropathy: Organic causes of peripheral neuropathy ruled out. Patient supposed to be on Neurontin 300 mg twice daily but patient is taking only twice daily states that her neuropathy is worse and she feels a lot of numbness and tingling in her upper extremities.       Hidradenitis suppurativa: States that she had a flareup under her left breast and has been using her medications. Patient states that she continues to have flareups and that doxycycline does not help. Patient states that she has not seen dermatology since 2019 before COVID. Patient Active Problem List   Diagnosis    Hyperlipidemia    Hypertension    Diabetes mellitus (HCC)    GERD (gastroesophageal reflux disease)    Obesity, morbid, BMI 50 or higher (HCC)    Hidradenitis suppurativa    DALILA (obstructive sleep apnea)    Perirectal abscess    Iron deficiency anemia    Dysmenorrhea    Menorrhagia    Acute cystitis without hematuria    Abnormal uterine bleeding (AUB)    Hysteroscopy, D&C, Myosure, Mirena IUD Insertion 11/18/19    Retained intrauterine contraceptive device (IUD)    Localized osteoarthritis of left knee       Health Maintenance Due   Topic Date Due    Pneumococcal 0-64 years Vaccine (1 - PCV) Never done    Hepatitis B vaccine (1 of 3 - Risk 3-dose series) Never done    COVID-19 Vaccine (3 - Booster for Pfizer series) 10/12/2021    Cervical cancer screen  04/07/2022    Flu vaccine (1) Never done       No Known Allergies      Current Outpatient Medications   Medication Sig Dispense Refill    naproxen (NAPROSYN) 500 MG tablet Take 1 tablet by mouth 2 times daily as needed for Pain 60 tablet 0    clindamycin (CLEOCIN T) 1 % lotion Apply to affected areas daily 60 mL 3    benzoyl peroxide 5 % external liquid Wash face, chest and back 1-2 times daily 227 g 3    Semaglutide,0.25 or 0.5MG/DOS, 2 MG/1.5ML SOPN Inject 0.25 mg into the skin once a week 1.5 mL 1    tiZANidine (ZANAFLEX) 4 MG tablet Take 1 tablet by mouth every 8 hours as needed (muscle spasms/pain) 90 tablet 0    metFORMIN (GLUCOPHAGE) 500 MG tablet take 1 tablet by mouth once daily with breakfast 60 tablet 3    amLODIPine (NORVASC) 10 MG tablet Take 1 tablet by mouth in the morning.  30 tablet 3    acetaminophen (ACETAMINOPHEN EXTRA STRENGTH) 500 MG tablet Take 1 tablet by mouth every 6 hours as needed for Pain 120 tablet 3    doxycycline hyclate (VIBRAMYCIN) 100 MG capsule take 1 capsule by mouth twice a day with food 60 capsule 3    gabapentin (NEURONTIN) 300 MG capsule Take 1 capsule by mouth in the morning and 1 capsule at noon and 1 capsule before bedtime. Do all this for 30 days. 90 capsule 1     Current Facility-Administered Medications   Medication Dose Route Frequency Provider Last Rate Last Admin    methylPREDNISolone acetate (DEPO-MEDROL) injection 80 mg  80 mg Intra-artICUlar Once Finesse Ellis,         lidocaine 1 % injection 2 mL  2 mL Intra-artICUlar Once Mame Rudolph, DO           Social History     Tobacco Use    Smoking status: Every Day     Packs/day: 0.50     Years: 30.00     Pack years: 15.00     Types: Cigarettes    Smokeless tobacco: Never   Vaping Use    Vaping Use: Never used   Substance Use Topics    Alcohol use: Not Currently     Comment: Occasionally    Drug use: Yes     Frequency: 1.5 times per week     Types: Marijuana (Weed)     Comment: 4 x a month       Family History   Problem Relation Age of Onset    Vision Loss Mother     Glaucoma Mother     Diabetes Father     High Blood Pressure Father     Glaucoma Father     Vision Loss Father     Kidney Cancer Maternal Grandmother     Kidney stones Brother     No Known Problems Maternal Grandfather     Diabetes Paternal Grandmother     Other Brother         stomach problem        REVIEW OF SYSTEMS:  Review of Systems   Constitutional:  Negative for activity change, appetite change and fever. HENT:  Negative for congestion. Respiratory:  Negative for cough, choking, chest tightness, shortness of breath and wheezing. Cardiovascular:  Negative for chest pain and leg swelling. Gastrointestinal:  Negative for abdominal distention, abdominal pain, blood in stool, constipation, diarrhea and nausea. Genitourinary:  Negative for vaginal bleeding. Neurological:  Positive for weakness and numbness.  Negative for dizziness, tremors, seizures, syncope, light-headedness and headaches. Psychiatric/Behavioral:  Negative for agitation, behavioral problems, confusion, decreased concentration, dysphoric mood and hallucinations. Tearful     PHYSICAL EXAM:  Vitals:    11/01/22 1309   BP: (!) 150/96   Site: Right Lower Arm   Position: Sitting   Cuff Size: Medium Adult   Pulse: 85   Temp: 97.9 °F (36.6 °C)   SpO2: 97%   Weight: (!) 313 lb (142 kg)   Height: 5' (1.524 m)     BP Readings from Last 3 Encounters:   11/01/22 (!) 150/96   10/04/22 132/77   08/16/22 120/86        Physical Exam  Constitutional:       General: She is not in acute distress. Appearance: She is obese. HENT:      Head: Normocephalic and atraumatic. Cardiovascular:      Rate and Rhythm: Normal rate and regular rhythm. Heart sounds: Normal heart sounds. No murmur heard. Pulmonary:      Effort: No respiratory distress. Breath sounds: Normal breath sounds. No wheezing. Abdominal:      General: Abdomen is flat. Bowel sounds are normal.      Palpations: Abdomen is soft. Musculoskeletal:      Right lower leg: No edema. Left lower leg: No edema. Neurological:      Mental Status: She is alert and oriented to person, place, and time.    Psychiatric:      Comments: Abnormal mood, tearful         DIAGNOSTIC FINDINGS:  CBC:  Lab Results   Component Value Date/Time    WBC 9.2 10/04/2022 02:05 PM    HGB 12.1 10/04/2022 02:05 PM     10/04/2022 02:05 PM       BMP:    Lab Results   Component Value Date/Time     10/04/2022 02:05 PM    K 3.8 10/04/2022 02:05 PM     10/04/2022 02:05 PM    CO2 24 10/04/2022 02:05 PM    BUN 11 10/04/2022 02:05 PM    CREATININE 0.44 10/04/2022 02:05 PM    GLUCOSE 97 10/04/2022 02:05 PM       HEMOGLOBIN A1C:   Lab Results   Component Value Date/Time    LABA1C 6.8 10/04/2022 01:12 PM       FASTING LIPID PANEL:  Lab Results   Component Value Date    CHOL 149 03/01/2021    HDL 48 05/23/2022    TRIG 118 03/01/2021 ASSESSMENT AND PLAN:  Joan Nichols was seen today for hypertension, weight loss and health maintenance. Diagnoses and all orders for this visit:    Type 2 diabetes mellitus without complication, without long-term current use of insulin ([facility])  - Continue metformin 500 mg daily    Peripheral neuropathy:   -Organic causes of peripheral neuropathy like vitamin B12 deficiency, HIV, vitamin B9 deficiency ruled out.   -Continue taking gabapentin 300 mg thrice daily. Obesity, morbid, BMI 50 or higher ([facility]  -     Semaglutide,0.25 or 0.5MG/DOS, 2 MG/1.5ML SOPN; Inject 0.25 mg into the skin once a week and increase titration. Confirmed with pharmacy and medication ready to be picked up. -     We will also add Wellbutrin for tobacco use, mood & additive advantages of early satiety and weight loss  - Follow-up with Dr. Silvestre Moncada with bariatric surgery. In the meantime discussed with patient and will prescribe semaglutide for weight loss. Hypertension, unspecified type  -   Controlled  -   Continue taking Norvasc 10 mg daily    Hidradenitis suppurativa  -     clindamycin (CLEOCIN T) 1 % lotion; Apply to affected areas daily  -     benzoyl peroxide 5 % external liquid; Wash face, chest and back 1-2 times daily  - Follow-up with Dr. Sheela Cheadle with dermatology    DALILA (obstructive sleep apnea)  - Continue using CPAP daily    Female pelvic pain  - naproxen (NAPROSYN) 500 MG tablet; Take 1 tablet by mouth 2 times daily as needed for Pain  Dispense: 60 tablet; Refill: 0    Acute stress disorder  Pt with numerous life stressor. Very tearful during last several encounters. Will initiate bupropion given pt current smoker, desire to lose weight & depressed mood. - buPROPion (WELLBUTRIN XL) 150 MG extended release tablet; Take 1 tablet by mouth every morning  Dispense: 30 tablet; Refill: 3    Tobacco abuse  - START buPROPion (WELLBUTRIN XL) 150 MG extended release tablet;  Take 1 tablet by mouth every morning  Dispense: 30 tablet; Refill: 3      FOLLOW UP AND INSTRUCTIONS:  Return in about 6 weeks (around 12/13/2022) for Depression, Love Monroy Awilda received counseling on the following healthy behaviors: nutrition, exercise, and medication adherence    Discussed use, benefit, and side effects of prescribed medications. Barriers to medication compliance addressed. All patient questions answered. Pt voiced understanding. Patient given educational materials - see patient instructions      Rk Rios MD  Internal Medicine Resident, PGY-3  1 [facility];  [facility]  11/1/2022,2:34 PM

## 2022-11-22 ENCOUNTER — HOSPITAL ENCOUNTER (OUTPATIENT)
Dept: CT IMAGING | Age: 47
Discharge: HOME | End: 2022-11-24
Payer: COMMERCIAL

## 2022-11-22 ENCOUNTER — HOSPITAL ENCOUNTER (OUTPATIENT)
Age: 47
Discharge: HOME | End: 2022-11-22
Payer: COMMERCIAL

## 2022-11-22 DIAGNOSIS — R94.39 ABNORMAL STRESS TEST: Primary | ICD-10-CM

## 2022-11-22 LAB
CREAT SERPL-MCNC: 0.48 MG/DL (ref 0.5–0.9)
GFR SERPL CREATININE-BSD FRML MDRD: >60 ML/MIN/1.73M2

## 2022-11-22 PROCEDURE — 2580000003 HC RX 258: Performed by: INTERNAL MEDICINE

## 2022-11-22 PROCEDURE — 36415 COLL VENOUS BLD VENIPUNCTURE: CPT

## 2022-11-22 PROCEDURE — 6360000004 HC RX CONTRAST MEDICATION: Performed by: INTERNAL MEDICINE

## 2022-11-22 PROCEDURE — 82565 ASSAY OF CREATININE: CPT

## 2022-11-22 PROCEDURE — 75574 CT ANGIO HRT W/3D IMAGE: CPT

## 2022-11-22 RX ORDER — SODIUM CHLORIDE 0.9 % (FLUSH) 0.9 %
10 SYRINGE (ML) INJECTION PRN
Status: DISCONTINUED | OUTPATIENT
Start: 2022-11-22 | End: 2022-11-25 | Stop reason: HOSPADM

## 2022-11-22 RX ORDER — 0.9 % SODIUM CHLORIDE 0.9 %
95 INTRAVENOUS SOLUTION INTRAVENOUS ONCE
Status: DISCONTINUED | OUTPATIENT
Start: 2022-11-22 | End: 2022-11-25 | Stop reason: HOSPADM

## 2022-11-22 RX ADMIN — Medication 95 ML: at 10:16

## 2022-11-22 RX ADMIN — Medication 10 ML: at 10:17

## 2022-11-22 RX ADMIN — IOPAMIDOL 133 ML: 755 INJECTION, SOLUTION INTRAVENOUS at 10:15

## 2022-11-22 NOTE — FLOWSHEET NOTE
CTA in CT with IR RN    HR 70   /79  0936- 2.5 mg lopressor adm IVP per writer    HR 70   /75  0941 2.5mg lopressor adm IVP per writer     HR 55    BP  118/77    0946  5mg lopressor IVP adm per wirter      1100 109/69  HR 70    Pt stable-tolerated well

## 2022-12-06 ENCOUNTER — HOSPITAL ENCOUNTER (OUTPATIENT)
Age: 47
Setting detail: SPECIMEN
Discharge: HOME | End: 2022-12-06

## 2022-12-06 ENCOUNTER — OFFICE VISIT (OUTPATIENT)
Dept: OBGYN | Age: 47
End: 2022-12-06
Payer: COMMERCIAL

## 2022-12-06 VITALS
HEART RATE: 89 BPM | SYSTOLIC BLOOD PRESSURE: 138 MMHG | WEIGHT: 293 LBS | DIASTOLIC BLOOD PRESSURE: 89 MMHG | BODY MASS INDEX: 59.96 KG/M2

## 2022-12-06 DIAGNOSIS — G89.29 CHRONIC BILATERAL LOW BACK PAIN WITHOUT SCIATICA: ICD-10-CM

## 2022-12-06 DIAGNOSIS — Z12.4 SCREENING FOR CERVICAL CANCER: ICD-10-CM

## 2022-12-06 DIAGNOSIS — Z01.419 WELL WOMAN EXAM WITH ROUTINE GYNECOLOGICAL EXAM: Primary | ICD-10-CM

## 2022-12-06 DIAGNOSIS — L03.311 CELLULITIS, ABDOMINAL WALL: ICD-10-CM

## 2022-12-06 DIAGNOSIS — Z11.3 ROUTINE SCREENING FOR STI (SEXUALLY TRANSMITTED INFECTION): ICD-10-CM

## 2022-12-06 DIAGNOSIS — M54.50 CHRONIC BILATERAL LOW BACK PAIN WITHOUT SCIATICA: ICD-10-CM

## 2022-12-06 DIAGNOSIS — Z23 IMMUNIZATION DUE: ICD-10-CM

## 2022-12-06 DIAGNOSIS — Z12.31 BREAST CANCER SCREENING BY MAMMOGRAM: ICD-10-CM

## 2022-12-06 PROCEDURE — G8482 FLU IMMUNIZE ORDER/ADMIN: HCPCS | Performed by: OBSTETRICS & GYNECOLOGY

## 2022-12-06 PROCEDURE — 99396 PREV VISIT EST AGE 40-64: CPT | Performed by: OBSTETRICS & GYNECOLOGY

## 2022-12-06 PROCEDURE — 90686 IIV4 VACC NO PRSV 0.5 ML IM: CPT | Performed by: OBSTETRICS & GYNECOLOGY

## 2022-12-06 PROCEDURE — 3074F SYST BP LT 130 MM HG: CPT | Performed by: OBSTETRICS & GYNECOLOGY

## 2022-12-06 PROCEDURE — 3078F DIAST BP <80 MM HG: CPT | Performed by: OBSTETRICS & GYNECOLOGY

## 2022-12-06 RX ORDER — SULFAMETHOXAZOLE AND TRIMETHOPRIM 800; 160 MG/1; MG/1
1 TABLET ORAL 2 TIMES DAILY
Qty: 20 TABLET | Refills: 0 | Status: SHIPPED | OUTPATIENT
Start: 2022-12-06 | End: 2022-12-16

## 2022-12-06 NOTE — PROGRESS NOTES
Patient was given Influenza in the Left Deltoid.  Per doctor [facility]# [id number]   LOT# [id number]  Exp date- 06/30/2023  Patient tolerated well without difficulty

## 2022-12-06 NOTE — PROGRESS NOTES
Kesha Laguerre  2022              41 y.o. Chief Complaint   Patient presents with    Follow-up     Vag discharge       No LMP recorded. Primary Care Physician: Nieves Spurling, MD    The patient was seen and examined. She has no chief complaint today and is here for her annual exam.  Her bowels are regular. There are no voiding complaints. She denies any bloating. She reports clear vaginal discharge and was counseled on STD's and the need for barrier contraception. HPI : Kesha Laguerre is a 41 y.o. female [id number] LMP 11/15/22. Periods are light since undergoing Adrianna endometrial ablation, last only 5 days. Pt reports a pulling lower abdominal pain for the past couple of weeks. It is aggravated with positioning of pannus. There is also a sore on the lower abdominal skin for the past several weeks.  She has been self treating with soaks and covering with a Band-Aid       ________________________________________________________________________  OB History    Para Term  AB Living   5 3 3 0 2 4   SAB IAB Ectopic Molar Multiple Live Births   1 1 0 0 1 4      # Outcome Date GA Lbr [place]/2nd Weight Sex Delivery Anes PTL Lv   5 Term      Vag-Spont   ARNOLDO   4 SAB            3 Term 18    F Vag-Spont   ARNOLDO   2 IAB            1A Term      Vag-Spont   ARNOLDO   1B Term      Vag-Spont   ARNOLDO     Past Medical History:   Diagnosis Date    Abnormal uterine bleeding (AUB)     Anemia     Arthritis     KNEE AND BACK    Asthma     Diabetes mellitus ([address]     GERD (gastroesophageal reflux disease)     Hidradenitis suppurativa     History of blood transfusion     Hyperlipidemia     Hypertension     Leg swelling     Obesity, morbid, BMI 50 or higher ([address]     Sleep apnea     needs new machine    Wears dentures     never wears them    Wellness examination 2021    pcp-[facility]-last visit 2021 Past Surgical History:   Procedure Laterality Date    ABSCESS DRAINAGE  12/23/2016    BUTTOCK    DILATION AND CURETTAGE OF UTERUS N/A 11/18/2019    DILATATION AND CURETTAGE HYSTEROSCOPY, [place] Cons, MIRENA IUD INSERTION performed by Nav Cruz MD at [address] [facility] N/A 7/26/2021    IUD REMOVAL, HYSTEROSCOPY,  MICHELE ENDOMETRIAL ABLATION REP CONFIRMED BY OFFC performed [facility] Nav [facility] MD [facility] [address] [facility] N/A 7/26/2021    DILATATION AND CURETTAGE [facility] N/A 07/02/2018    RECTAL PERIRECTAL INCISION AND DRAINAGE performed by Paige Marti DO at [address] [facility]     Family History   Problem Relation Age of Onset    Vision Loss Mother     Glaucoma Mother     Diabetes Father     High Blood Pressure Father     Glaucoma Father     Vision Loss Father     Kidney Cancer Maternal Grandmother     Kidney stones Brother     No Known Problems Maternal Grandfather     Diabetes Paternal Grandmother     Other Brother         stomach problem     Social History     Socioeconomic History    Marital status: Single     Spouse name: Not on file    Number of children: 4    Years of education: 8th grade    Highest education level: Not on file   Occupational History    Occupation: unemployed     Comment: working on [employer]   Tobacco Use    Smoking status: Every Day     Packs/day: 0.50     Years: 30.00     Pack years: 15.00     Types: Cigarettes    Smokeless tobacco: Never   Vaping Use    Vaping Use: Never used   Substance and Sexual Activity    Alcohol use: Not Currently     Comment: Occasionally    Drug use: Yes     Frequency: 1.5 times per week     Types: Marijuana (Weed)     Comment: 4 x a month    Sexual activity: Yes     Partners: Male     Comment: 1 partner boyfriend   Other Topics Concern    Not on file   Social History Narrative    Lives with 4 kids age 22yo to 32year old     Social Determinants of Health     Financial Resource Strain: Medium Risk    Difficulty of Paying Living Expenses: Somewhat hard   Food Insecurity: No Food Insecurity    Worried About Running Out of Food in the Last Year: Never true    Ran Out of Food in the Last Year: Never true   Transportation Needs: Not on file   Physical Activity: Not on file   Stress: Not on file   Social Connections: Not on file   Intimate Partner Violence: Not on file   Housing Stability: Not on file       MEDICATIONS:  Current Outpatient Medications on File Prior to Visit   Medication Sig Dispense Refill    naproxen (NAPROSYN) 500 MG tablet Take 1 tablet by mouth 2 times daily as needed for Pain 60 tablet 0    buPROPion (WELLBUTRIN XL) 150 MG extended release tablet Take 1 tablet by mouth every morning 30 tablet 3    clindamycin (CLEOCIN T) 1 % lotion Apply to affected areas daily 60 mL 3    benzoyl peroxide 5 % external liquid Wash face, chest and back 1-2 times daily 227 g 3    Semaglutide,0.25 or 0.5MG/DOS, 2 MG/1.5ML SOPN Inject 0.25 mg into the skin once a week 1.5 mL 1    gabapentin (NEURONTIN) 300 MG capsule Take 1 capsule by mouth in the morning and 1 capsule at noon and 1 capsule before bedtime. Do all this for 30 days. 90 capsule 1    tiZANidine (ZANAFLEX) 4 MG tablet Take 1 tablet by mouth every 8 hours as needed (muscle spasms/pain) 90 tablet 0    metFORMIN (GLUCOPHAGE) 500 MG tablet take 1 tablet by mouth once daily with breakfast 60 tablet 3    amLODIPine (NORVASC) 10 MG tablet Take 1 tablet by mouth in the morning.  30 tablet 3    acetaminophen (ACETAMINOPHEN EXTRA STRENGTH) 500 MG tablet Take 1 tablet by mouth every 6 hours as needed for Pain 120 tablet 3    doxycycline hyclate (VIBRAMYCIN) 100 MG capsule take 1 capsule by mouth twice a day with food 60 capsule 3     Current Facility-Administered Medications on File Prior to Visit   Medication Dose Route Frequency Provider Last Rate Last Admin    methylPREDNISolone acetate (DEPO-MEDROL) injection 80 mg  80 mg Intra-artICUlar Once Finesse Ellis DO        lidocaine 1 % injection 2 mL  2 mL Intra-artICUlar Once Nia Edwards DO             ALLERGIES:  Allergies as of 12/06/2022    (No Known Allergies)     Immunization status: not up to date      Gynecologic History:         No LMP recorded. Sexually Active: Yes    STD History: Yes GC ,chlamydia     Permanent Sterilization: Yes , BTL  Reversible Birth Control: No        Hormone Replacement Exposure:No      Genetic Qualified Family History of Breast, Ovarian , Colon or Uterine Cancer: No     If YES see scanned worksheet. Preventative Health Testing:  Date of Last Pap Smear: 2017  Abnormal Pap Smear History:   Colposcopy History:   Date of Last Mammogram: 2019  Date of Last Colonoscopy:   Date of Last Bone Density:      ________________________________________________________________________  REVIEW OF SYSTEMS:       A minimum of an eleven point review of systems was completed. Review Of Systems (11 point):  Constitutional: No fever, chills or malaise; No weight change or fatigue  Head and Eyes: No vision, Headache, Dizziness or trauma in last 12 months  ENT ROS: No hearing, Tinnitis, sinus or taste problems  Hematological and Lymphatic ROS:No Lymphoma, Von Willebrand's, Hemophillia or Bleeding History  Psych ROS: No Depression, Homicidal thoughts,suicidal thoughts, or anxiety  Breast ROS: No prior breast abnormalities or lumps  Respiratory ROS: No SOB, Pneumoniae,Cough, or Pulmonary Embolism History  Cardiovascular ROS: No Chest Pain with Exertion, Palpitations, Syncope, Edema, Arrhythmia  Gastrointestinal ROS: No Indigestion, Heartburn, Nausea, vomiting, Diarrhea, Constipation,or Bowel Changes; No Bloody Stools or melena  Genito-Urinary ROS: No Dysuria, Hematuria or Nocturia.  No Urinary Incontinence or Vaginal Discharge  Musculoskeletal ROS: No Arthralgia, Arthritis,Gout,Osteoporosis or Rheumatism  Neurological ROS: No CVA, Migraines, Epilepsy, Seizure Hx, or Limb Weakness  Dermatological ROS:  + HI, POSITIVE ulcer anterior abdomen. No Rash, Itching, Hives, Mole Changes or Cancer                                                                                                                                                                                                                                  PHYSICAL Exam:     Constitutional:  not currently breastfeeding. General Appearance: This  is a well Developed, well Nourished, well groomed female. Her BMI was reviewed. Nutritional decision making was discussed. Skin:  Scattered scarring skin of breasts and anterior abdominal wall. 1 cm ulcer with surrounding induration RLQ abdomen. Lymphatic:  No Lymph Nodes were Palpable in the neck , axilla or groin. Neck and EENT:  The neck was supple. There were no masses   The thyroid was not enlarged and had no masses. Throat inspected-No exudates or Masses, Nares Patent No Masses        Respiratory: The lungs were auscultated and found to be clear. There were no rales, rhonchi or wheezes. There was a good respiratory effort. Cardiovascular: The heart was in a regular rate and rhythm. . No S3 or S4. There was no murmur appreciated. Extremities: The patients extremities were without calf tenderness, edema, or varicosities. Abdomen: The abdomen was soft and non-tender. There  no guarding, rebound or rigidity. On evaluation there was no evidence of hepatosplenomegaly and there was no costal vertebral lamont tenderness bilaterally. No hernias were appreciated. Abdominal Scars: ulcer RLQ, 1 cm in greatest dimension with associated surrounding induration. The lesion was debrided with a sterile 4X4, neosporin ointment applied and covered with large sterile Band-Aid. Psych:   The patient had a normal Orientation to: Time, Place, Person, and Situation  There is no Mood / Affect changes    Breast: (Chest)  normal appearance, no masses or tenderness      Pelvic Exam:  Vulva normal without lesions  Vagina normal   Cervix without visible lesions  Uterus non tender freely movable not enlarged  Adnexa not palpable      Rectovaginal exam: deferred            Musculosk:  Normal Gait and station was noted. Digits were evaluated without abnormal findings. ASSESSMENT:      41 y.o. Annual   Diagnosis Orders   1. Well woman exam with routine gynecological exam  PAP Smear      2. Screening for cervical cancer  PAP Smear      3. Routine screening for STI (sexually transmitted infection)  Chlamydia Trachomatis & Neisseria gonorrhoeae (GC) by amplified detection    Vaginitis DNA Probe      4. Breast cancer screening by mammogram  ESTEBAN DIGITAL SCREEN W OR WO CAD BILATERAL      5. Cellulitis, abdominal wall  sulfamethoxazole-trimethoprim (BACTRIM DS) 800-160 MG per tablet    [address], , General Surgery, [facility]      6.  Immunization due  Influenza, AFLURIA, (age 1 y+), IM, Preservative Free, 0.5 mL               Chief Complaint   Patient presents with    Follow-up     Vag discharge          Past Medical History:   Diagnosis Date    Abnormal uterine bleeding (AUB)     Anemia     Arthritis     KNEE AND BACK    Asthma     Diabetes mellitus ([address]     GERD (gastroesophageal reflux disease)     Hidradenitis suppurativa     History of blood transfusion 1997    Hyperlipidemia     Hypertension     Leg swelling     Obesity, morbid, BMI 50 or higher ([address]     Sleep apnea     needs new machine    Wears dentures     never wears them    Wellness examination 07/12/2021    pcp-[facility]-last visit june 2021         Patient Active Problem List   Diagnosis    Hyperlipidemia    Hypertension    Diabetes mellitus ([address]    GERD (gastroesophageal reflux disease)    Obesity, morbid, BMI 50 or higher (HCC)    Hidradenitis suppurativa    DALILA (obstructive sleep apnea)    Perirectal abscess    Iron deficiency anemia    Dysmenorrhea    Menorrhagia    Acute cystitis without hematuria    Abnormal uterine bleeding (AUB)    Hysteroscopy, D&C, Myosure, Mirena IUD Insertion 11/18/19    Retained intrauterine contraceptive device (IUD)    Localized osteoarthritis of left knee          Hereditary Breast, Ovarian, Colon and Uterine Cancer screening Done. Tobacco & Secondary smoke risks reviewed; instructed on cessation and avoidance      Counseling Completed: Refer to plan           PLAN:  1. Well woman exam with routine gynecological exam    - PAP Smear; Future    2. Screening for cervical cancer    - PAP Smear; Future    3. Routine screening for STI (sexually transmitted infection)    - Chlamydia Trachomatis & Neisseria gonorrhoeae (GC) by amplified detection; Future  - Vaginitis DNA Probe; Future    4. Breast cancer screening by mammogram    - ESTEBAN DIGITAL SCREEN W OR WO CAD BILATERAL; Future    5. Cellulitis, abdominal wall  - warm compresses   - S/S of worsening infection reviewed. - sulfamethoxazole-trimethoprim (BACTRIM DS) 800-160 MG per tablet; Take 1 tablet by mouth 2 times daily for 10 days  Dispense: 20 tablet; Refill: 0  - [address], DO, General Surgery, [facility]    6. Immunization due    - Influenza, AFLURIA, (age 1 y+), IM, Preservative Free, 0.5 mL    Follow up Pap as per American Society for Colposcopy and Cervical Pathology guidelines. Mammogram ordered   Calcium and Vitamin D dosing reviewed. Colonoscopy screening reviewed as well as onset for bone density testing. Birth control and barrier recommendations discussed. STD counseling and prevention reviewed. Gardisil counseling completed for all patients 9- 44yo. Routine health maintenance per patients PCP.

## 2022-12-06 NOTE — TELEPHONE ENCOUNTER
E-scribe request for Zanaflex . Please review and e-scribe if applicable. Next Visit Date:  Future Appointments   Date Time Provider [place]cker   12/20/2022  1:20 PM Nannette Stephen MD [facility] IM [id number]   1/9/2023  6:30 PM [address] Maintenance   Topic Date Due    Pneumococcal 0-64 years Vaccine (1 - PCV) Never done    Hepatitis B vaccine (1 of 3 - Risk 3-dose series) Never done    COVID-19 Vaccine (3 - Booster for Pfizer series) 10/12/2021    Cervical cancer screen  04/07/2022    Colorectal Cancer Screen  11/17/2022    Diabetic foot exam  05/17/2023    Lipids  05/23/2023    Diabetic retinal exam  07/11/2023    A1C test (Diabetic or Prediabetic)  10/04/2023    Diabetic microalbuminuria test  10/04/2023    Depression Screen  10/04/2023    DTaP/Tdap/Td vaccine (2 - Td or Tdap) 09/24/2031    Flu vaccine  Completed    Hepatitis C screen  Completed    HIV screen  Completed    Hepatitis A vaccine  Aged Out    Hib vaccine  Aged Out    Meningococcal (ACWY) vaccine  Aged Out               (applicable per patient's age: Cancer Screenings, Depression Screening, Fall Risk Screening, Immunizations)    Hemoglobin A1C (%)   Date Value   10/04/2022 6.8   05/17/2022 6.2   09/24/2021 6.3     Microalb/Crt.  Ratio (mcg/mg creat)   Date Value   10/04/2022 Can not be calculated     LDL Cholesterol (mg/dL)   Date Value   05/23/2022 88     AST (U/L)   Date Value   05/11/2018 11     ALT (U/L)   Date Value   05/11/2021 12     BUN (mg/dL)   Date Value   10/04/2022 11      (goal A1C is < 7)   (goal LDL is <100) need 30-50% reduction from baseline     BP Readings from Last 3 Encounters:   12/06/22 138/89   11/01/22 122/76   10/04/22 132/77    (goal /80)      All Future Testing planned in [facility]:  Lab Frequency Next Occurrence   ESTEBAN DIGITAL SCREEN W OR WO CAD BILATERAL Once 11/09/2022   Chlamydia Trachomatis & Neisseria gonorrhoeae (GC) by amplified detection Once 12/06/2022   Vaginitis DNA Probe Once 12/06/2022   PAP Smear Once 12/06/2022   ESTEBAN DIGITAL SCREEN W OR WO CAD BILATERAL Once 12/06/2022            Patient Active Problem List:     Hyperlipidemia     Hypertension     Diabetes mellitus [address]     GERD (gastroesophageal reflux disease)     Obesity, morbid, BMI 50 or higher (HCC)     Hidradenitis suppurativa     DALILA (obstructive sleep apnea)     Perirectal abscess     Iron deficiency anemia     Dysmenorrhea     Menorrhagia     Acute cystitis without hematuria     Abnormal uterine bleeding (AUB)     Hysteroscopy, D&C, Myosure, Mirena IUD Insertion 11/18/19     Retained intrauterine contraceptive device (IUD)     Localized osteoarthritis of left knee

## 2022-12-07 DIAGNOSIS — Z11.3 ROUTINE SCREENING FOR STI (SEXUALLY TRANSMITTED INFECTION): ICD-10-CM

## 2022-12-07 LAB
CANDIDA SPECIES, DNA PROBE: NEGATIVE
GARDNERELLA VAGINALIS, DNA PROBE: POSITIVE
SOURCE: ABNORMAL
TRICHOMONAS VAGINALIS DNA: NEGATIVE

## 2022-12-07 RX ORDER — TIZANIDINE 4 MG/1
TABLET ORAL
Qty: 90 TABLET | Refills: 0 | Status: SHIPPED | OUTPATIENT
Start: 2022-12-07

## 2022-12-08 DIAGNOSIS — B96.89 BV (BACTERIAL VAGINOSIS): Primary | ICD-10-CM

## 2022-12-08 DIAGNOSIS — N76.0 BV (BACTERIAL VAGINOSIS): Primary | ICD-10-CM

## 2022-12-08 LAB
C TRACH DNA GENITAL QL NAA+PROBE: NEGATIVE
HPV SAMPLE: NORMAL
HPV, GENOTYPE 16: NOT DETECTED
HPV, GENOTYPE 18: NOT DETECTED
HPV, HIGH RISK OTHER: NOT DETECTED
HPV, INTERPRETATION: NORMAL
N. GONORRHOEAE DNA: NEGATIVE
SPECIMEN DESCRIPTION: NORMAL
SPECIMEN DESCRIPTION: NORMAL

## 2022-12-08 RX ORDER — METRONIDAZOLE 500 MG/1
500 TABLET ORAL 2 TIMES DAILY
Qty: 14 TABLET | Refills: 0 | Status: SHIPPED | OUTPATIENT
Start: 2022-12-08 | End: 2022-12-15

## 2023-01-09 ENCOUNTER — HOSPITAL ENCOUNTER (OUTPATIENT)
Dept: WOMENS IMAGING | Age: 48
Discharge: HOME | End: 2023-01-11
Payer: COMMERCIAL

## 2023-01-09 DIAGNOSIS — Z12.31 BREAST CANCER SCREENING BY MAMMOGRAM: ICD-10-CM

## 2023-01-09 PROCEDURE — 77063 BREAST TOMOSYNTHESIS BI: CPT

## 2023-01-31 DIAGNOSIS — E66.01 OBESITY, MORBID, BMI 50 OR HIGHER (HCC): ICD-10-CM

## 2023-01-31 NOTE — TELEPHONE ENCOUNTER
Health Maintenance   Topic Date Due    Pneumococcal 0-64 years Vaccine (1 - PCV) Never done    Hepatitis B vaccine (1 of 3 - Risk 3-dose series) Never done    COVID-19 Vaccine (3 - Booster for Pfizer series) 10/12/2021    Colorectal Cancer Screen  Never done    Diabetic foot exam  05/17/2023    Lipids  05/23/2023    Diabetic retinal exam  07/11/2023    A1C test (Diabetic or Prediabetic)  10/04/2023    Diabetic Alb to Cr ratio (uACR) test  10/04/2023    Depression Screen  10/04/2023    GFR test (Diabetes, CKD 3-4, OR last GFR 15-59)  11/22/2023    Cervical cancer screen  12/06/2027    DTaP/Tdap/Td vaccine (2 - Td or Tdap) 09/24/2031    Flu vaccine  Completed    Hepatitis C screen  Completed    HIV screen  Completed    Hepatitis A vaccine  Aged Out    Hib vaccine  Aged Out    Meningococcal (ACWY) vaccine  Aged Out             (applicable per patient's age: Cancer Screenings, Depression Screening, Fall Risk Screening, Immunizations)    Hemoglobin A1C (%)   Date Value   10/04/2022 6.8   05/17/2022 6.2   09/24/2021 6.3     Microalb/Crt. Ratio (mcg/mg creat)   Date Value   10/04/2022 Can not be calculated     LDL Cholesterol (mg/dL)   Date Value   05/23/2022 88     AST (U/L)   Date Value   05/11/2018 11     ALT (U/L)   Date Value   05/11/2021 12     BUN (mg/dL)   Date Value   10/04/2022 11      (goal A1C is < 7)   (goal LDL is <100) need 30-50% reduction from baseline     BP Readings from Last 3 Encounters:   12/06/22 138/89   11/01/22 122/76   10/04/22 132/77    (goal /80)      All Future Testing planned in [facility]:  Lab Frequency Next Occurrence   PAP Smear Once 12/06/2022       Next Visit Date:  No future appointments.          Patient Active Problem List:     Hyperlipidemia     Hypertension     Diabetes mellitus (HCC)     GERD (gastroesophageal reflux disease)     Obesity, morbid, BMI 50 or higher (HCC)     Hidradenitis suppurativa     DALILA (obstructive sleep apnea)     Perirectal abscess     Iron deficiency anemia     Dysmenorrhea     Menorrhagia     Acute cystitis without hematuria     Abnormal uterine bleeding (AUB)     Hysteroscopy, D&C, Myosure, Mirena IUD Insertion 11/18/19     Retained intrauterine contraceptive device (IUD)     Localized osteoarthritis of left knee

## 2023-02-01 RX ORDER — SEMAGLUTIDE 1.34 MG/ML
INJECTION, SOLUTION SUBCUTANEOUS
Qty: 1.5 ML | Refills: 1 | Status: SHIPPED | OUTPATIENT
Start: 2023-02-01

## 2023-02-22 DIAGNOSIS — N94.6 PAINFUL MENSTRUATION: ICD-10-CM

## 2023-02-22 DIAGNOSIS — F43.0 ACUTE STRESS DISORDER: ICD-10-CM

## 2023-02-22 DIAGNOSIS — I10 ESSENTIAL HYPERTENSION: ICD-10-CM

## 2023-02-22 DIAGNOSIS — R10.2 FEMALE PELVIC PAIN: ICD-10-CM

## 2023-02-22 DIAGNOSIS — Z72.0 TOBACCO ABUSE: ICD-10-CM

## 2023-02-22 DIAGNOSIS — E66.01 OBESITY, MORBID, BMI 50 OR HIGHER (HCC): ICD-10-CM

## 2023-02-23 NOTE — TELEPHONE ENCOUNTER
Request for Amlodipine. Next Visit Date:  No future appointments. Health Maintenance   Topic Date Due    Pneumococcal 0-64 years Vaccine (1 - PCV) Never done    Hepatitis B vaccine (1 of 3 - Risk 3-dose series) Never done    COVID-19 Vaccine (3 - Booster for Pfizer series) 10/12/2021    Colorectal Cancer Screen  Never done    Diabetic foot exam  05/17/2023    Lipids  05/23/2023    Diabetic retinal exam  07/11/2023    A1C test (Diabetic or Prediabetic)  10/04/2023    Diabetic Alb to Cr ratio (uACR) test  10/04/2023    Depression Screen  10/04/2023    GFR test (Diabetes, CKD 3-4, OR last GFR 15-59)  11/22/2023    Cervical cancer screen  12/06/2027    DTaP/Tdap/Td vaccine (2 - Td or Tdap) 09/24/2031    Flu vaccine  Completed    Hepatitis C screen  Completed    HIV screen  Completed    Hepatitis A vaccine  Aged Out    Hib vaccine  Aged Out    Meningococcal (ACWY) vaccine  Aged Out       Hemoglobin A1C (%)   Date Value   10/04/2022 6.8   05/17/2022 6.2   09/24/2021 6.3             ( goal A1C is < 7)   Microalb/Crt.  Ratio (mcg/mg creat)   Date Value   10/04/2022 Can not be calculated     LDL Cholesterol (mg/dL)   Date Value   05/23/2022 88       (goal LDL is <100)   AST (U/L)   Date Value   05/11/2018 11     ALT (U/L)   Date Value   05/11/2021 12     BUN (mg/dL)   Date Value   10/04/2022 11     BP Readings from Last 3 Encounters:   12/06/22 138/89   11/01/22 122/76   10/04/22 132/77          (goal 120/80)    All Future Testing planned in [facility]  Lab Frequency Next Occurrence   PAP Smear Once 12/06/2022         Patient Active Problem List:     Hyperlipidemia     Hypertension     Diabetes mellitus ([address]     GERD (gastroesophageal reflux disease)     Obesity, morbid, BMI 50 or higher (HCC)     Hidradenitis suppurativa     DALILA (obstructive sleep apnea)     Perirectal abscess     Iron deficiency anemia     Dysmenorrhea     Menorrhagia     Acute cystitis without hematuria     Abnormal uterine bleeding (AUB) Hysteroscopy, D&C, Myosure, Mirena IUD Insertion 11/18/19     Retained intrauterine contraceptive device (IUD)     Localized osteoarthritis of left knee

## 2023-02-24 DIAGNOSIS — E66.01 OBESITY, MORBID, BMI 50 OR HIGHER (HCC): Chronic | ICD-10-CM

## 2023-02-24 DIAGNOSIS — Z72.0 TOBACCO ABUSE: ICD-10-CM

## 2023-02-24 DIAGNOSIS — F43.0 ACUTE STRESS DISORDER: ICD-10-CM

## 2023-02-24 RX ORDER — AMLODIPINE BESYLATE 10 MG/1
10 TABLET ORAL DAILY
Qty: 30 TABLET | Refills: 3 | Status: SHIPPED | OUTPATIENT
Start: 2023-02-24

## 2023-02-24 RX ORDER — BUPROPION HYDROCHLORIDE 150 MG/1
150 TABLET ORAL EVERY MORNING
Qty: 30 TABLET | Refills: 3 | OUTPATIENT
Start: 2023-02-24

## 2023-02-26 DIAGNOSIS — N94.6 PAINFUL MENSTRUATION: ICD-10-CM

## 2023-02-26 DIAGNOSIS — G89.29 CHRONIC BILATERAL LOW BACK PAIN WITHOUT SCIATICA: ICD-10-CM

## 2023-02-26 DIAGNOSIS — L73.2 HIDRADENITIS SUPPURATIVA: ICD-10-CM

## 2023-02-26 DIAGNOSIS — M54.50 CHRONIC BILATERAL LOW BACK PAIN WITHOUT SCIATICA: ICD-10-CM

## 2023-02-26 DIAGNOSIS — R10.2 FEMALE PELVIC PAIN: ICD-10-CM

## 2023-02-27 NOTE — TELEPHONE ENCOUNTER
Last seen 12/06/22   Multiple meds pended     Health Maintenance   Topic Date Due    Pneumococcal 0-64 years Vaccine (1 - PCV) Never done    Hepatitis B vaccine (1 of 3 - Risk 3-dose series) Never done    COVID-19 Vaccine (3 - Booster for Pfizer series) 10/12/2021    Colorectal Cancer Screen  Never done    Diabetic foot exam  05/17/2023    Lipids  05/23/2023    Diabetic retinal exam  07/11/2023    A1C test (Diabetic or Prediabetic)  10/04/2023    Diabetic Alb to Cr ratio (uACR) test  10/04/2023    Depression Screen  10/04/2023    GFR test (Diabetes, CKD 3-4, OR last GFR 15-59)  11/22/2023    Cervical cancer screen  12/06/2027    DTaP/Tdap/Td vaccine (2 - Td or Tdap) 09/24/2031    Flu vaccine  Completed    Hepatitis C screen  Completed    HIV screen  Completed    Hepatitis A vaccine  Aged Out    Hib vaccine  Aged Out    Meningococcal (ACWY) vaccine  Aged Out             (applicable per patient's age: Cancer Screenings, Depression Screening, Fall Risk Screening, Immunizations)    Hemoglobin A1C (%)   Date Value   10/04/2022 6.8   05/17/2022 6.2   09/24/2021 6.3     Microalb/Crt.  Ratio (mcg/mg creat)   Date Value   10/04/2022 Can not be calculated     LDL Cholesterol (mg/dL)   Date Value   05/23/2022 88     AST (U/L)   Date Value   05/11/2018 11     ALT (U/L)   Date Value   05/11/2021 12     BUN (mg/dL)   Date Value   10/04/2022 11      (goal A1C is < 7)   (goal LDL is <100) need 30-50% reduction from baseline     BP Readings from Last 3 Encounters:   12/06/22 138/89   11/01/22 122/76   10/04/22 132/77    (goal /80)      All Future Testing planned in [facility]:  Lab Frequency Next Occurrence   PAP Smear Once 12/06/2022       Next Visit Date:  Future Appointments   Date Time Provider Red Decker   3/6/2023  2:00 PM Penny Uribe, [facility] [address]            Patient Active Problem List:     Hyperlipidemia     Hypertension     Diabetes mellitus ([address]     GERD (gastroesophageal reflux disease) Obesity, morbid, BMI 50 or higher (HCC)     Hidradenitis suppurativa     DALILA (obstructive sleep apnea)     Perirectal abscess     Iron deficiency anemia     Dysmenorrhea     Menorrhagia     Acute cystitis without hematuria     Abnormal uterine bleeding (AUB)     Hysteroscopy, D&C, Myosure, Mirena IUD Insertion 11/18/19     Retained intrauterine contraceptive device (IUD)     Localized osteoarthritis of left knee

## 2023-02-27 NOTE — TELEPHONE ENCOUNTER
Last seen 11/22  Multiple meds pended     Health Maintenance   Topic Date Due    Pneumococcal 0-64 years Vaccine (1 - PCV) Never done    Hepatitis B vaccine (1 of 3 - Risk 3-dose series) Never done    COVID-19 Vaccine (3 - Booster for Pfizer series) 10/12/2021    Colorectal Cancer Screen  Never done    Diabetic foot exam  05/17/2023    Lipids  05/23/2023    Diabetic retinal exam  07/11/2023    A1C test (Diabetic or Prediabetic)  10/04/2023    Diabetic Alb to Cr ratio (uACR) test  10/04/2023    Depression Screen  10/04/2023    GFR test (Diabetes, CKD 3-4, OR last GFR 15-59)  11/22/2023    Cervical cancer screen  12/06/2027    DTaP/Tdap/Td vaccine (2 - Td or Tdap) 09/24/2031    Flu vaccine  Completed    Hepatitis C screen  Completed    HIV screen  Completed    Hepatitis A vaccine  Aged Out    Hib vaccine  Aged Out    Meningococcal (ACWY) vaccine  Aged Out             (applicable per patient's age: Cancer Screenings, Depression Screening, Fall Risk Screening, Immunizations)    Hemoglobin A1C (%)   Date Value   10/04/2022 6.8   05/17/2022 6.2   09/24/2021 6.3     Microalb/Crt.  Ratio (mcg/mg creat)   Date Value   10/04/2022 Can not be calculated     LDL Cholesterol (mg/dL)   Date Value   05/23/2022 88     AST (U/L)   Date Value   05/11/2018 11     ALT (U/L)   Date Value   05/11/2021 12     BUN (mg/dL)   Date Value   10/04/2022 11      (goal A1C is < 7)   (goal LDL is <100) need 30-50% reduction from baseline     BP Readings from Last 3 Encounters:   12/06/22 138/89   11/01/22 122/76   10/04/22 132/77    (goal /80)      All Future Testing planned in [facility]:  Lab Frequency Next Occurrence   PAP Smear Once 12/06/2022       Next Visit Date:  Future Appointments   Date Time Provider [place]   3/6/2023  2:00 PM Penny Uribe, [facility] [address]            Patient Active Problem List:     Hyperlipidemia     Hypertension     Diabetes mellitus ([address]     GERD (gastroesophageal reflux disease)     Obesity, morbid, BMI 50 or higher (HCC)     Hidradenitis suppurativa     DALILA (obstructive sleep apnea)     Perirectal abscess     Iron deficiency anemia     Dysmenorrhea     Menorrhagia     Acute cystitis without hematuria     Abnormal uterine bleeding (AUB)     Hysteroscopy, D&C, Myosure, Mirena IUD Insertion 11/18/19     Retained intrauterine contraceptive device (IUD)     Localized osteoarthritis of left knee DISPLAY PLAN FREE TEXT

## 2023-03-02 RX ORDER — TIZANIDINE 4 MG/1
TABLET ORAL
Qty: 90 TABLET | Refills: 0 | Status: SHIPPED | OUTPATIENT
Start: 2023-03-02

## 2023-03-02 RX ORDER — DOXYCYCLINE HYCLATE 100 MG/1
CAPSULE ORAL
Qty: 60 CAPSULE | Refills: 3 | Status: SHIPPED | OUTPATIENT
Start: 2023-03-02

## 2023-03-02 RX ORDER — BUPROPION HYDROCHLORIDE 150 MG/1
150 TABLET ORAL EVERY MORNING
Qty: 30 TABLET | Refills: 3 | Status: SHIPPED | OUTPATIENT
Start: 2023-03-02

## 2023-03-02 RX ORDER — NAPROXEN 500 MG/1
TABLET ORAL
Qty: 60 TABLET | Refills: 0 | OUTPATIENT
Start: 2023-03-02

## 2023-03-02 RX ORDER — SEMAGLUTIDE 1.34 MG/ML
0.5 INJECTION, SOLUTION SUBCUTANEOUS WEEKLY
Qty: 1.5 ML | Refills: 1 | Status: SHIPPED | OUTPATIENT
Start: 2023-03-02

## 2023-03-02 RX ORDER — NAPROXEN 500 MG/1
500 TABLET ORAL 2 TIMES DAILY PRN
Qty: 60 TABLET | Refills: 0 | Status: SHIPPED | OUTPATIENT
Start: 2023-03-02

## 2023-03-20 ENCOUNTER — TELEPHONE (OUTPATIENT)
Dept: INTERNAL MEDICINE | Age: 48
End: 2023-03-20

## 2023-04-15 DIAGNOSIS — E11.9 TYPE 2 DIABETES MELLITUS WITHOUT COMPLICATION, WITHOUT LONG-TERM CURRENT USE OF INSULIN (HCC): ICD-10-CM

## 2023-04-18 ENCOUNTER — TELEPHONE (OUTPATIENT)
Dept: INTERNAL MEDICINE | Age: 48
End: 2023-04-18

## 2023-04-18 NOTE — TELEPHONE ENCOUNTER
Received triage call from [address] [place] [address],[address], pt c/o foot pain since starting her new job a couple weeks ago. Pt is adamant this is new in nature. Pt upset that we cannot accommodate an appt today, writer offered first available appt on her next day off. Pt asking for a letter for work stating she needs to be seated, states PCP gave her one before. Writer finally located a note from Dr. Richard Tim who is no longer here. Writer attempted to call pt back to let her know letter was found, no answer. Left HIPAA compliant message identifying self and nature of call, requested call back to writer, phone number given. Pt mentioned that she has Amado Sorensen will attempt to contact through [address] as well.

## 2023-04-19 ENCOUNTER — HOSPITAL ENCOUNTER (EMERGENCY)
Age: 48
Discharge: HOME | End: 2023-04-19
Attending: EMERGENCY MEDICINE
Payer: COMMERCIAL

## 2023-04-19 VITALS
WEIGHT: 293 LBS | RESPIRATION RATE: 18 BRPM | BODY MASS INDEX: 61.52 KG/M2 | SYSTOLIC BLOOD PRESSURE: 128 MMHG | OXYGEN SATURATION: 98 % | DIASTOLIC BLOOD PRESSURE: 83 MMHG | HEART RATE: 76 BPM | TEMPERATURE: 98.4 F

## 2023-04-19 DIAGNOSIS — S96.919A: Primary | ICD-10-CM

## 2023-04-19 PROCEDURE — 99283 EMERGENCY DEPT VISIT LOW MDM: CPT

## 2023-04-19 RX ORDER — IBUPROFEN 600 MG/1
600 TABLET ORAL EVERY 6 HOURS PRN
Qty: 40 TABLET | Refills: 0 | Status: SHIPPED | OUTPATIENT
Start: 2023-04-19 | End: 2023-04-29

## 2023-04-19 SDOH — ECONOMIC STABILITY: HOUSING INSECURITY
IN THE LAST 12 MONTHS, WAS THERE A TIME WHEN YOU DID NOT HAVE A STEADY PLACE TO SLEEP OR SLEPT IN A SHELTER (INCLUDING NOW)?: YES

## 2023-04-19 SDOH — ECONOMIC STABILITY: FOOD INSECURITY: WITHIN THE PAST 12 MONTHS, THE FOOD YOU BOUGHT JUST DIDN'T LAST AND YOU DIDN'T HAVE MONEY TO GET MORE.: OFTEN TRUE

## 2023-04-19 SDOH — ECONOMIC STABILITY: INCOME INSECURITY: HOW HARD IS IT FOR YOU TO PAY FOR THE VERY BASICS LIKE FOOD, HOUSING, MEDICAL CARE, AND HEATING?: VERY HARD

## 2023-04-19 SDOH — ECONOMIC STABILITY: TRANSPORTATION INSECURITY
IN THE PAST 12 MONTHS, HAS LACK OF TRANSPORTATION KEPT YOU FROM MEETINGS, WORK, OR FROM GETTING THINGS NEEDED FOR DAILY LIVING?: YES

## 2023-04-19 SDOH — ECONOMIC STABILITY: FOOD INSECURITY: WITHIN THE PAST 12 MONTHS, YOU WORRIED THAT YOUR FOOD WOULD RUN OUT BEFORE YOU GOT MONEY TO BUY MORE.: OFTEN TRUE

## 2023-04-19 ASSESSMENT — ENCOUNTER SYMPTOMS
DIARRHEA: 0
ABDOMINAL PAIN: 0
SHORTNESS OF BREATH: 0
FACIAL SWELLING: 0
WHEEZING: 0
VOMITING: 0
NAUSEA: 0
VOICE CHANGE: 0
COUGH: 0

## 2023-04-19 ASSESSMENT — PAIN SCALES - GENERAL: PAINLEVEL_OUTOF10: 8

## 2023-04-19 ASSESSMENT — PAIN - FUNCTIONAL ASSESSMENT: PAIN_FUNCTIONAL_ASSESSMENT: 0-10

## 2023-04-19 NOTE — DISCHARGE INSTRUCTIONS
Please take the Motrin at least 3 times a day, ice your ankle 3 times a day, purchase supportive orthotics for your shoes and wear them every day, rest your feet, perform the exercises included in these instructions at least once a day, do not perform them to the point where it causes increased pain. Please call your orthopedic surgeon for an appointment as soon as possible, speak with your employer about limited work activity and the possibility of doing your job while seated.

## 2023-04-19 NOTE — ED PROVIDER NOTES
[facility]  ED  Emergency Department Encounter  Emergency Medicine Attending     Pt Hayden Alcocer  MRN: [id number]  Zohragfchelsey 1975  Date of evaluation: 4/19/23  PCP:  Hilton Hoang MD      [address] [place] [address]       Chief Complaint   Patient presents with    Leg Pain     Left x3 weeks       HISTORY OF PRESENT ILLNESS  (Location/Symptom, Timing/Onset, Context/Setting, Quality, Duration, Modifying Factors, Severity.)      Susanna Madera is a 41 y.o. female who presents with bilateral Achilles pain for the last 2 days. She recently started a new job at [employer] where she is on her feet for 10 hours a day, she wears steel toed boots. She has chronic knee pain for which she sees orthopedics. She states she worked last night but had to go home early because of this particular pain. They do do stretches before starting work however she feels she is not able to continue this job. She just recently started this job in the last several days. Denies any falls, she is ambulatory, she is weightbearing as tolerated. She denies any fevers or chills, denies any falls or other injuries. PAST MEDICAL / SURGICAL / SOCIAL / FAMILY HISTORY      has a past medical history of Abnormal uterine bleeding (AUB), Anemia, Arthritis, Asthma, Diabetes mellitus ([address] GERD (gastroesophageal reflux disease), Hidradenitis suppurativa, History of blood transfusion, Hyperlipidemia, Hypertension, Leg swelling, Obesity, morbid, BMI 50 or higher ([address] Sleep apnea, Wears dentures, and Wellness examination. has a past surgical history that includes Tubal ligation (1997); Abscess Drainage (12/23/2016); pr unlisted procedure rectum (N/A, 07/02/2018); Dilation and curettage of uterus (N/A, 11/18/2019); Dilation and curettage of uterus (N/A, 7/26/2021); and Dilation and curettage of uterus (N/A, 7/26/2021).       Social History     Socioeconomic History    Marital status: Single     Spouse name: Not on file

## 2023-04-20 ENCOUNTER — OFFICE VISIT (OUTPATIENT)
Dept: INTERNAL MEDICINE | Age: 48
End: 2023-04-20
Payer: COMMERCIAL

## 2023-04-20 VITALS
OXYGEN SATURATION: 94 % | BODY MASS INDEX: 57.52 KG/M2 | WEIGHT: 293 LBS | TEMPERATURE: 97.9 F | SYSTOLIC BLOOD PRESSURE: 139 MMHG | DIASTOLIC BLOOD PRESSURE: 84 MMHG | HEART RATE: 64 BPM | HEIGHT: 60 IN

## 2023-04-20 DIAGNOSIS — M79.605 ACUTE PAIN OF LEFT LOWER EXTREMITY: Primary | ICD-10-CM

## 2023-04-20 DIAGNOSIS — E11.9 TYPE 2 DIABETES MELLITUS WITHOUT COMPLICATION, WITHOUT LONG-TERM CURRENT USE OF INSULIN (HCC): ICD-10-CM

## 2023-04-20 DIAGNOSIS — I10 ESSENTIAL HYPERTENSION: ICD-10-CM

## 2023-04-20 DIAGNOSIS — G47.33 OSA (OBSTRUCTIVE SLEEP APNEA): ICD-10-CM

## 2023-04-20 DIAGNOSIS — M76.61 RIGHT ACHILLES TENDINITIS: ICD-10-CM

## 2023-04-20 PROCEDURE — 99211 OFF/OP EST MAY X REQ PHY/QHP: CPT | Performed by: INTERNAL MEDICINE

## 2023-04-20 RX ORDER — NAPROXEN 500 MG/1
500 TABLET ORAL 2 TIMES DAILY PRN
Qty: 60 TABLET | Refills: 0 | Status: SHIPPED | OUTPATIENT
Start: 2023-04-20

## 2023-04-20 RX ORDER — AMLODIPINE BESYLATE 10 MG/1
10 TABLET ORAL DAILY
Qty: 30 TABLET | Refills: 3 | Status: SHIPPED | OUTPATIENT
Start: 2023-04-20

## 2023-04-20 SDOH — ECONOMIC STABILITY: FOOD INSECURITY: WITHIN THE PAST 12 MONTHS, YOU WORRIED THAT YOUR FOOD WOULD RUN OUT BEFORE YOU GOT MONEY TO BUY MORE.: NEVER TRUE

## 2023-04-20 SDOH — ECONOMIC STABILITY: INCOME INSECURITY: HOW HARD IS IT FOR YOU TO PAY FOR THE VERY BASICS LIKE FOOD, HOUSING, MEDICAL CARE, AND HEATING?: NOT HARD AT ALL

## 2023-04-20 SDOH — ECONOMIC STABILITY: HOUSING INSECURITY
IN THE LAST 12 MONTHS, WAS THERE A TIME WHEN YOU DID NOT HAVE A STEADY PLACE TO SLEEP OR SLEPT IN A SHELTER (INCLUDING NOW)?: NO

## 2023-04-20 SDOH — ECONOMIC STABILITY: FOOD INSECURITY: WITHIN THE PAST 12 MONTHS, THE FOOD YOU BOUGHT JUST DIDN'T LAST AND YOU DIDN'T HAVE MONEY TO GET MORE.: NEVER TRUE

## 2023-04-20 ASSESSMENT — ENCOUNTER SYMPTOMS
ALLERGIC/IMMUNOLOGIC NEGATIVE: 1
GASTROINTESTINAL NEGATIVE: 1
RESPIRATORY NEGATIVE: 1
EYES NEGATIVE: 1

## 2023-04-20 ASSESSMENT — PATIENT HEALTH QUESTIONNAIRE - PHQ9
2. FEELING DOWN, DEPRESSED OR HOPELESS: 0
SUM OF ALL RESPONSES TO PHQ QUESTIONS 1-9: 0
1. LITTLE INTEREST OR PLEASURE IN DOING THINGS: 0
SUM OF ALL RESPONSES TO PHQ QUESTIONS 1-9: 0
SUM OF ALL RESPONSES TO PHQ9 QUESTIONS 1 & 2: 0

## 2023-04-20 NOTE — PROGRESS NOTES
[facility] ([facility] [address] [facility] [address]  Dept: [phone]  Dept Fax: [phone]    Office Progress/Follow Up Note  Date ofpatient's visit: 4/20/2023  Patient's Name:  Gisele Fletcher YOB: 1975            Patient Care Team:  Marilou Suarez MD as PCP - General (Internal Medicine)  ================================================================    REASON FOR VISIT/CHIEF COMPLAINT:  Leg Pain (Left leg pain. Dropped bed frame on leg. Pain for month), Foot Pain (Tendinitis in back of foot on both feet, dx in ER yesterday), and Referral - General (Request for podiatry )    HISTORY OF PRESENTING ILLNESS:  History was obtained from: patient. Omelia Burkitt a 41 y.o. DALILA on cpap, acute stress disorder,type 2 DM and essential hypertension is here for a ED follow up for bilateral lower extremity pain. Dropped bed frame one month ago on her left leg and continued to have pain 8  and get worse at night,burning sensation. Swelling has gone better. She denied any weakness on left lower extremity. Also mention of pain in right achilles tendon since 14 days after she started to work in "[employer] where she required to stand for continuous 10 hours. /84    BMI of 58.71 was evaluated by bariatric surgeon for bariatric surgery but she never followed up with them. Currently see mention of taking some time and decide further.     Patient Active Problem List   Diagnosis    Hyperlipidemia    Hypertension    Diabetes mellitus (HCC)    GERD (gastroesophageal reflux disease)    Obesity, morbid, BMI 50 or higher (HCC)    Hidradenitis suppurativa    DALILA (obstructive sleep apnea)    Perirectal abscess    Iron deficiency anemia    Dysmenorrhea    Menorrhagia    Acute cystitis without hematuria    Abnormal uterine bleeding (AUB)    Hysteroscopy, D&C, Myosure, Mirena IUD Insertion 11/18/19    Retained intrauterine contraceptive device (IUD)    Localized

## 2023-04-20 NOTE — PROGRESS NOTES
Attending Physician Statement  I have discussed the care of David Manjarrez including pertinent history and exam findings,  with the resident. I have reviewed the key elements of all parts of the encounter with the resident. I agree with the assessment, plan and orders as documented by the resident. (GE Modifier)  1. Acute pain of left lower extremity    - XR TIBIA FIBULA LEFT (2 VIEWS); Future    2. Right Achilles tendinitis    - naproxen (NAPROSYN) 500 MG tablet; Take 1 tablet by mouth 2 times daily as needed for Pain  Dispense: 60 tablet; Refill: 0    Inability to bear weight or standing for long periods of times     3. DALILA (obstructive sleep apnea)    4. Essential hypertension    - amLODIPine (NORVASC) 10 MG tablet; Take 1 tablet by mouth daily  Dispense: 30 tablet; Refill: 3    5. Type 2 diabetes mellitus without complication, without long-term current use of insulin (HCC)    - metFORMIN (GLUCOPHAGE) 500 MG tablet; take 1 tablet by mouth once daily with breakfast  Dispense: 60 tablet;  Refill: 3          MD MANNIE Patterson  Attending Physician, [address] [place] [address], Internal Medicine Residency Program  [address] [place] [address]  4/20/2023, 4:31 PM

## 2023-04-21 ENCOUNTER — HOSPITAL ENCOUNTER (OUTPATIENT)
Age: 48
End: 2023-04-21
Payer: COMMERCIAL

## 2023-04-21 ENCOUNTER — HOSPITAL ENCOUNTER (OUTPATIENT)
Dept: GENERAL RADIOLOGY | Age: 48
End: 2023-04-21
Payer: COMMERCIAL

## 2023-04-21 DIAGNOSIS — M79.605 ACUTE PAIN OF LEFT LOWER EXTREMITY: ICD-10-CM

## 2023-04-21 PROCEDURE — 73590 X-RAY EXAM OF LOWER LEG: CPT

## 2023-04-27 ENCOUNTER — OFFICE VISIT (OUTPATIENT)
Dept: ORTHOPEDIC SURGERY | Age: 48
End: 2023-04-27

## 2023-04-27 DIAGNOSIS — M25.562 LEFT KNEE PAIN, UNSPECIFIED CHRONICITY: ICD-10-CM

## 2023-04-27 DIAGNOSIS — M17.12 ARTHRITIS OF LEFT KNEE: Primary | ICD-10-CM

## 2023-04-27 RX ORDER — BUPIVACAINE HYDROCHLORIDE 2.5 MG/ML
2 INJECTION, SOLUTION INFILTRATION; PERINEURAL ONCE
Status: COMPLETED | OUTPATIENT
Start: 2023-04-27 | End: 2023-04-27

## 2023-04-27 RX ORDER — METHYLPREDNISOLONE ACETATE 80 MG/ML
80 INJECTION, SUSPENSION INTRA-ARTICULAR; INTRALESIONAL; INTRAMUSCULAR; SOFT TISSUE ONCE
Status: COMPLETED | OUTPATIENT
Start: 2023-04-27 | End: 2023-04-27

## 2023-04-27 RX ADMIN — METHYLPREDNISOLONE ACETATE 80 MG: 80 INJECTION, SUSPENSION INTRA-ARTICULAR; INTRALESIONAL; INTRAMUSCULAR; SOFT TISSUE at 13:20

## 2023-04-27 RX ADMIN — BUPIVACAINE HYDROCHLORIDE 5 MG: 2.5 INJECTION, SOLUTION INFILTRATION; PERINEURAL at 13:20

## 2023-04-27 NOTE — PROGRESS NOTES
site.      Assessment:      1. Arthritis of left knee    2. Left knee pain, unspecified chronicity         Plan:      Jayla العلي is a 41 y.o. female here in follow for chronic left knee pain due to osteoarthritis. I personally interpreted and reviewed the patient's recent x-rays revealing moderate degenerative changes within the left knee. The patient requested a left knee corticosteroid injection today. Her last injection was on 8/3/2022 and she noted moderate improvement in her knee discomfort within the injection until the last few weeks. Patient was given a left knee corticosteroid injection today in office. She tolerated the procedure without complication. I discussed with patient she can repeat injection every 4 months if needed therefore she would be eligible on or after 8/27/2023. She noted her understanding. I also had a lengthy discussion with the patient today in office about the need for weight loss prior to proceeding with any surgical intervention for her left knee. Patient has a current BMI of 60.54 (310 pounds). She would need to be at or below a BMI of 40.0 prior to any surgical intervention for the left knee due to the fact that it dramatically increases her risk for wound healing complications and surgical site infections. Patient noted her understanding. She notes that she may go back to weight management to discuss possible surgical intervention to help with her weight loss. She is a prior patient who successfully went through all the steps prior to scheduling surgery. The patient is to follow-up in 6 weeks for further evaluation. The patient was instructed to call our office with any questions or concerns prior to the next appointment. The patient noted her understanding. Follow up:Return in about 6 weeks (around 6/8/2023).         Orders Placed This Encounter   Medications    methylPREDNISolone acetate (DEPO-MEDROL) injection 80 mg    bupivacaine

## 2023-04-30 VITALS — WEIGHT: 293 LBS | BODY MASS INDEX: 60.54 KG/M2

## 2023-04-30 ASSESSMENT — ENCOUNTER SYMPTOMS
SHORTNESS OF BREATH: 0
COLOR CHANGE: 0
VOMITING: 0
COUGH: 0

## 2023-05-10 NOTE — ED NOTES
Patient presents to the ED with c/o left leg pain. Patient reports that she was carrying an object two weeks ago and dropped it on her leg. Patient c/o 8/10 pain. Patient is alert and oriented x4, answering questions appropriately. Patient is changed into a gown.        Malvin Huggins RN  04/19/23 0618 V-Y Flap Text: The defect edges were debeveled with a #15 scalpel blade. Given the location of the defect, shape of the defect and the proximity to free margins a V-Y flap was deemed most appropriate. Using a sterile surgical marker, an appropriate advancement flap was drawn incorporating the defect and placing the expected incisions within the relaxed skin tension lines where possible. The area thus outlined was incised deep to adipose tissue with a #15 scalpel blade. The skin margins were undermined to an appropriate distance in all directions utilizing iris scissors. Following this, the designed flap was advanced and carried over into the primary defect and sutured into place.

## 2023-05-24 ENCOUNTER — TELEPHONE (OUTPATIENT)
Dept: INTERNAL MEDICINE | Age: 48
End: 2023-05-24

## 2023-05-24 DIAGNOSIS — E11.9 TYPE 2 DIABETES MELLITUS WITHOUT COMPLICATION, WITHOUT LONG-TERM CURRENT USE OF INSULIN (HCC): Primary | Chronic | ICD-10-CM

## 2023-06-05 ENCOUNTER — TELEPHONE (OUTPATIENT)
Dept: INTERNAL MEDICINE | Age: 48
End: 2023-06-05

## 2023-06-05 NOTE — TELEPHONE ENCOUNTER
Resubmitted prior authorization for Ozempic.  Prescriber physician not found submitted with last attending Dr. Kevin Dubois

## 2023-06-05 NOTE — TELEPHONE ENCOUNTER
----- Message from Kenney Neil sent at 6/5/2023  9:58 AM EDT -----  Subject: Message to Provider    QUESTIONS  Information for Provider? pt is in a lot of pain in her ankles. feet,   knees and back. Pt is not able to stand and move to work. Pt would like a   letter stating she is not able to work due to pain. Please call patient   with questions and/or when she could  letter from practice or   please contact via [employer].   ---------------------------------------------------------------------------  --------------  [address] [facility]  [phone]; OK to leave message on voicemail  ---------------------------------------------------------------------------  --------------  SCRIPT ANSWERS  Relationship to Patient?  Self

## 2023-06-07 RX ORDER — DULAGLUTIDE 0.75 MG/.5ML
0.75 INJECTION, SOLUTION SUBCUTANEOUS WEEKLY
Qty: 2 ML | Refills: 1 | Status: SHIPPED | OUTPATIENT
Start: 2023-06-07

## 2023-06-07 NOTE — TELEPHONE ENCOUNTER
Denial letter received for Ozempic, see media. Per denial, pt must have tried at least 120 days of therapy with three of preferred medications:    Madelaine Lose    Also, one of the tried medications MUST have been either Byetta, Victoza, or Trulicity. Script pended for Trulicity as it is once a week injection like Ozempic. Please advise. Only one refill pended in case dose needs to be adjusted.

## 2023-06-09 NOTE — TELEPHONE ENCOUNTER
Patient called and advised to go to ED, patient states she already went and it happened three weeks ago. Patient will followup at appt 6/13/23.

## 2023-06-25 DIAGNOSIS — G89.29 CHRONIC BILATERAL LOW BACK PAIN WITHOUT SCIATICA: ICD-10-CM

## 2023-06-25 DIAGNOSIS — M76.61 RIGHT ACHILLES TENDINITIS: ICD-10-CM

## 2023-06-25 DIAGNOSIS — M54.50 CHRONIC BILATERAL LOW BACK PAIN WITHOUT SCIATICA: ICD-10-CM

## 2023-06-27 DIAGNOSIS — G89.29 OTHER CHRONIC PAIN: ICD-10-CM

## 2023-06-27 DIAGNOSIS — L73.2 HIDRADENITIS SUPPURATIVA: ICD-10-CM

## 2023-06-27 DIAGNOSIS — M76.61 RIGHT ACHILLES TENDINITIS: ICD-10-CM

## 2023-06-28 RX ORDER — DOXYCYCLINE HYCLATE 100 MG/1
CAPSULE ORAL
Qty: 60 CAPSULE | Refills: 3 | Status: SHIPPED | OUTPATIENT
Start: 2023-06-28

## 2023-06-28 RX ORDER — ACETAMINOPHEN 500 MG
500 TABLET ORAL EVERY 6 HOURS PRN
Qty: 120 TABLET | Refills: 3 | Status: SHIPPED | OUTPATIENT
Start: 2023-06-28

## 2023-06-28 RX ORDER — TIZANIDINE 4 MG/1
TABLET ORAL
Qty: 90 TABLET | Refills: 0 | Status: SHIPPED | OUTPATIENT
Start: 2023-06-28

## 2023-06-28 RX ORDER — GABAPENTIN 300 MG/1
300 CAPSULE ORAL 3 TIMES DAILY
Qty: 90 CAPSULE | Refills: 1 | Status: SHIPPED | OUTPATIENT
Start: 2023-06-28 | End: 2023-07-28

## 2023-06-28 RX ORDER — NAPROXEN 500 MG/1
500 TABLET ORAL 2 TIMES DAILY PRN
Qty: 60 TABLET | Refills: 0 | Status: SHIPPED | OUTPATIENT
Start: 2023-06-28

## 2023-06-28 RX ORDER — NAPROXEN 500 MG/1
TABLET ORAL
Qty: 60 TABLET | Refills: 0 | OUTPATIENT
Start: 2023-06-28

## 2023-06-28 RX ORDER — CLINDAMYCIN PHOSPHATE 10 UG/ML
LOTION TOPICAL
Qty: 60 ML | Refills: 3 | Status: SHIPPED | OUTPATIENT
Start: 2023-06-28

## 2023-06-28 NOTE — TELEPHONE ENCOUNTER
Last seen 06/13/23      Next Visit Date:  No future appointments. Health Maintenance   Topic Date Due    Pneumococcal 0-64 years Vaccine (1 - PCV) Never done    Hepatitis B vaccine (1 of 3 - Risk 3-dose series) Never done    Colorectal Cancer Screen  Never done    Lipids  05/23/2023    Diabetic retinal exam  07/11/2023    Diabetic Alb to Cr ratio (uACR) test  10/04/2023    GFR test (Diabetes, CKD 3-4, OR last GFR 15-59)  11/22/2023    Depression Screen  04/20/2024    Diabetic foot exam  06/13/2024    A1C test (Diabetic or Prediabetic)  06/13/2024    Cervical cancer screen  12/06/2027    DTaP/Tdap/Td vaccine (2 - Td or Tdap) 09/24/2031    Flu vaccine  Completed    COVID-19 Vaccine  Completed    Hepatitis C screen  Completed    HIV screen  Completed    Hepatitis A vaccine  Aged Out    Hib vaccine  Aged Out    Meningococcal (ACWY) vaccine  Aged Out    Depression Monitoring  Discontinued       Hemoglobin A1C (%)   Date Value   06/13/2023 6.1   10/04/2022 6.8   05/17/2022 6.2             ( goal A1C is < 7)   Microalb/Crt.  Ratio (mcg/mg creat)   Date Value   10/04/2022 Can not be calculated     LDL Cholesterol (mg/dL)   Date Value   05/23/2022 88       (goal LDL is <100)   AST (U/L)   Date Value   05/11/2018 11     ALT (U/L)   Date Value   05/11/2021 12     BUN (mg/dL)   Date Value   10/04/2022 11     BP Readings from Last 3 Encounters:   06/13/23 112/71   04/20/23 139/84   04/19/23 128/83          (goal 120/80)    All Future Testing planned in [facility]  Lab Frequency Next Occurrence   PAP Smear Once 12/06/2022         Patient Active Problem List:     Hyperlipidemia     Hypertension     Diabetes mellitus ([address])     GERD (gastroesophageal reflux disease)     Obesity, morbid, BMI 50 or higher (HCC)     Hidradenitis suppurativa     DALILA (obstructive sleep apnea)     Perirectal abscess     Iron deficiency anemia     Dysmenorrhea     Menorrhagia     Acute cystitis without hematuria     Abnormal uterine bleeding (AUB)

## 2023-07-24 DIAGNOSIS — E66.01 OBESITY, MORBID, BMI 50 OR HIGHER (HCC): ICD-10-CM

## 2023-07-24 DIAGNOSIS — F43.0 ACUTE STRESS DISORDER: ICD-10-CM

## 2023-07-24 DIAGNOSIS — Z72.0 TOBACCO ABUSE: ICD-10-CM

## 2023-07-24 RX ORDER — BUPROPION HYDROCHLORIDE 150 MG/1
150 TABLET ORAL EVERY MORNING
Qty: 30 TABLET | Refills: 2 | Status: SHIPPED | OUTPATIENT
Start: 2023-07-24

## 2023-07-24 NOTE — TELEPHONE ENCOUNTER
Medication refill for Bupropion    Last visit: 6/13/23  Last Med refill: 3/2/23  Does patient have enough medication for 72 hours: No:     Next Visit Date:  No future appointments.     Health Maintenance   Topic Date Due    Pneumococcal 0-64 years Vaccine (1 - PCV) Never done    Hepatitis B vaccine (1 of 3 - Risk 3-dose series) Never done    Colorectal Cancer Screen  Never done    Lipids  05/23/2023    Diabetic retinal exam  07/11/2023    Flu vaccine (1) 08/01/2023    Diabetic Alb to Cr ratio (uACR) test  10/04/2023    GFR test (Diabetes, CKD 3-4, OR last GFR 15-59)  11/22/2023    Depression Screen  04/20/2024    Diabetic foot exam  06/13/2024    A1C test (Diabetic or Prediabetic)  06/13/2024    Cervical cancer screen  12/06/2027    DTaP/Tdap/Td vaccine (2 - Td or Tdap) 09/24/2031    COVID-19 Vaccine  Completed    Hepatitis C screen  Completed    HIV screen  Completed    Hepatitis A vaccine  Aged Out    Hib vaccine  Aged Out    Meningococcal (ACWY) vaccine  Aged Out    Depression Monitoring  Discontinued       Hemoglobin A1C (%)   Date Value   06/13/2023 6.1   10/04/2022 6.8   05/17/2022 6.2             ( goal A1C is < 7)   No components found for: LABMICR  LDL Cholesterol (mg/dL)   Date Value   05/23/2022 88   03/01/2021 81       (goal LDL is <100)   AST (U/L)   Date Value   05/11/2018 11     ALT (U/L)   Date Value   05/11/2021 12     BUN (mg/dL)   Date Value   10/04/2022 11     BP Readings from Last 3 Encounters:   06/13/23 112/71   04/20/23 139/84   04/19/23 128/83          (goal 120/80)    All Future Testing planned in [facility]  Lab Frequency Next Occurrence   PAP Smear Once 12/06/2022               Patient Active Problem List:     Hyperlipidemia     Hypertension     Diabetes mellitus (HCC)     GERD (gastroesophageal reflux disease)     Obesity, morbid, BMI 50 or higher (HCC)     Hidradenitis suppurativa     DALILA (obstructive sleep apnea)     Perirectal abscess     Iron deficiency anemia     Dysmenorrhea

## 2023-07-31 DIAGNOSIS — E11.9 TYPE 2 DIABETES MELLITUS WITHOUT COMPLICATION, WITHOUT LONG-TERM CURRENT USE OF INSULIN (HCC): Chronic | ICD-10-CM

## 2023-07-31 RX ORDER — DULAGLUTIDE 0.75 MG/.5ML
0.75 INJECTION, SOLUTION SUBCUTANEOUS WEEKLY
Qty: 2 ML | Refills: 1 | Status: SHIPPED | OUTPATIENT
Start: 2023-07-31

## 2023-07-31 NOTE — TELEPHONE ENCOUNTER
Pharmacy requesting a refill for Trulicity 5.79AQ/9.6UO PEN. Transfer from Dr. Juan Velasquez. Please review and e-scribe to pharmacy listed in chart if appropriate. Thank you. Next Visit Date: none scheduled  Last Visit Date: 06/13/2023  Does patient have enough medication for 72 hours: No    No future appointments.     Health Maintenance   Topic Date Due    Pneumococcal 0-64 years Vaccine (1 - PCV) Never done    Hepatitis B vaccine (1 of 3 - Risk 3-dose series) Never done    Colorectal Cancer Screen  Never done    Lipids  05/23/2023    Diabetic retinal exam  07/11/2023    Flu vaccine (1) 08/01/2023    Diabetic Alb to Cr ratio (uACR) test  10/04/2023    GFR test (Diabetes, CKD 3-4, OR last GFR 15-59)  11/22/2023    Depression Screen  04/20/2024    Diabetic foot exam  06/13/2024    A1C test (Diabetic or Prediabetic)  06/13/2024    Cervical cancer screen  12/06/2027    DTaP/Tdap/Td vaccine (2 - Td or Tdap) 09/24/2031    COVID-19 Vaccine  Completed    Hepatitis C screen  Completed    HIV screen  Completed    Hepatitis A vaccine  Aged Out    Hib vaccine  Aged Out    Meningococcal (ACWY) vaccine  Aged Out    Depression Monitoring  Discontinued       Hemoglobin A1C (%)   Date Value   06/13/2023 6.1   10/04/2022 6.8   05/17/2022 6.2             ( goal A1C is < 7)   No components found for: LABMICR  LDL Cholesterol (mg/dL)   Date Value   05/23/2022 88       (goal LDL is <100)   AST (U/L)   Date Value   05/11/2018 11     ALT (U/L)   Date Value   05/11/2021 12     BUN (mg/dL)   Date Value   10/04/2022 11     BP Readings from Last 3 Encounters:   06/13/23 112/71   04/20/23 139/84   04/19/23 128/83          (goal 120/80)    All Future Testing planned in [facility]  Lab Frequency Next Occurrence   PAP Smear Once 12/06/2022         Patient Active Problem List:     Hyperlipidemia     Hypertension     Diabetes mellitus ([address])     GERD (gastroesophageal reflux disease)     Obesity, morbid, BMI 50 or higher (HCC)     Hidradenitis

## 2023-08-23 DIAGNOSIS — I10 ESSENTIAL HYPERTENSION: ICD-10-CM

## 2023-08-23 NOTE — TELEPHONE ENCOUNTER
Lm for pt to callback for NTP appt    Last visit: 6/13/23  Last Med refill:   Does patient have enough medication for 72 hours: No:     Next Visit Date:  Future Appointments   Date Time Provider [address]   12/14/2023  1:00 PM Maey Handley MD [facility] [address] Maintenance   Topic Date Due    Pneumococcal 0-64 years Vaccine (1 - PCV) Never done    Hepatitis B vaccine (1 of 3 - Risk 3-dose series) Never done    Colorectal Cancer Screen  Never done    Lipids  05/23/2023    Diabetic retinal exam  07/11/2023    Flu vaccine (1) 08/01/2023    Diabetic Alb to Cr ratio (uACR) test  10/04/2023    GFR test (Diabetes, CKD 3-4, OR last GFR 15-59)  11/22/2023    Depression Screen  04/20/2024    Diabetic foot exam  06/13/2024    A1C test (Diabetic or Prediabetic)  06/13/2024    Cervical cancer screen  12/06/2027    DTaP/Tdap/Td vaccine (2 - Td or Tdap) 09/24/2031    COVID-19 Vaccine  Completed    Hepatitis C screen  Completed    HIV screen  Completed    Hepatitis A vaccine  Aged Out    Hib vaccine  Aged Out    Meningococcal (ACWY) vaccine  Aged Out    Depression Monitoring  Discontinued       Hemoglobin A1C (%)   Date Value   06/13/2023 6.1   10/04/2022 6.8   05/17/2022 6.2             ( goal A1C is < 7)   No components found for: LABMICR  LDL Cholesterol (mg/dL)   Date Value   05/23/2022 88   03/01/2021 81       (goal LDL is <100)   AST (U/L)   Date Value   05/11/2018 11     ALT (U/L)   Date Value   05/11/2021 12     BUN (mg/dL)   Date Value   10/04/2022 11     BP Readings from Last 3 Encounters:   06/13/23 112/71   04/20/23 139/84   04/19/23 128/83          (goal 120/80)    All Future Testing planned in [facility]  Lab Frequency Next Occurrence   PAP Smear Once 12/06/2022               Patient Active Problem List:     Hyperlipidemia     Hypertension     Diabetes mellitus ([address])     GERD (gastroesophageal reflux disease)     Obesity, morbid, BMI 50 or higher (HCC)     Hidradenitis suppurativa     DALILA

## 2023-08-24 RX ORDER — AMLODIPINE BESYLATE 10 MG/1
TABLET ORAL
Qty: 30 TABLET | Refills: 5 | Status: SHIPPED | OUTPATIENT
Start: 2023-08-24

## 2023-09-24 DIAGNOSIS — E11.9 TYPE 2 DIABETES MELLITUS WITHOUT COMPLICATION, WITHOUT LONG-TERM CURRENT USE OF INSULIN (HCC): Chronic | ICD-10-CM

## 2023-09-25 NOTE — TELEPHONE ENCOUNTER
Pharmacy requesting refills for TRULICITY. Please review and e-scribe to pharmacy listed in chart if appropriate. Thank you.       Next Visit Date: no f/u scheduled  Last Visit Date: 6/13/23    Future Appointments   Date Time Provider [address]   12/14/2023  1:00 PM Angie Palacio MD [address] [facility] [address] Maintenance   Topic Date Due    Hepatitis B vaccine (1 of 3 - 3-dose series) Never done    Pneumococcal 0-64 years Vaccine (1 - PCV) Never done    HPV vaccine (2 - 3-dose SCDM series) 09/06/2019    Colorectal Cancer Screen  Never done    Lipids  05/23/2023    Diabetic retinal exam  07/11/2023    Flu vaccine (1) 08/01/2023    Diabetic Alb to Cr ratio (uACR) test  10/04/2023    GFR test (Diabetes, CKD 3-4, OR last GFR 15-59)  11/22/2023    Depression Screen  04/20/2024    Diabetic foot exam  06/13/2024    A1C test (Diabetic or Prediabetic)  06/13/2024    Cervical cancer screen  12/06/2027    DTaP/Tdap/Td vaccine (2 - Td or Tdap) 09/24/2031    COVID-19 Vaccine  Completed    Hepatitis C screen  Completed    HIV screen  Completed    Hepatitis A vaccine  Aged Out    Hib vaccine  Aged Out    Meningococcal (ACWY) vaccine  Aged Out    Depression Monitoring  Discontinued       Hemoglobin A1C (%)   Date Value   06/13/2023 6.1   10/04/2022 6.8   05/17/2022 6.2             ( goal A1C is < 7)   No components found for: \"LABMICR\"  LDL Cholesterol (mg/dL)   Date Value   05/23/2022 88       (goal LDL is <100)   AST (U/L)   Date Value   05/11/2018 11     ALT (U/L)   Date Value   05/11/2021 12     BUN (mg/dL)   Date Value   10/04/2022 11     BP Readings from Last 3 Encounters:   06/13/23 112/71   04/20/23 139/84   04/19/23 128/83          (goal 120/80)    All Future Testing planned in [facility]  Lab Frequency Next Occurrence   PAP Smear Once 12/06/2022         Patient Active Problem List:     Hyperlipidemia     Hypertension     Diabetes mellitus ([address])     GERD (gastroesophageal reflux disease)     Obesity, morbid, BMI

## 2023-09-26 RX ORDER — DULAGLUTIDE 0.75 MG/.5ML
INJECTION, SOLUTION SUBCUTANEOUS
Qty: 2 ML | Refills: 1 | Status: SHIPPED | OUTPATIENT
Start: 2023-09-26

## 2023-10-02 DIAGNOSIS — M76.61 RIGHT ACHILLES TENDINITIS: ICD-10-CM

## 2023-10-02 NOTE — TELEPHONE ENCOUNTER
Request for Naproxen. Please review and e-scribe to pharmacy listed in chart if appropriate. Thank you.       Next Visit Date: 12/14/2023  Last Visit Date: 6/13/2023    Future Appointments   Date Time Provider [address]   12/14/2023  1:00 PM Marely Baez MD [facility] OB/Gyn [address] Maintenance   Topic Date Due    Hepatitis B vaccine (1 of 3 - 3-dose series) Never done    Pneumococcal 0-64 years Vaccine (1 - PCV) Never done    HPV vaccine (2 - 3-dose SCDM series) 09/06/2019    Colorectal Cancer Screen  Never done    Lipids  05/23/2023    Diabetic retinal exam  07/11/2023    Flu vaccine (1) 08/01/2023    Diabetic Alb to Cr ratio (uACR) test  10/04/2023    GFR test (Diabetes, CKD 3-4, OR last GFR 15-59)  11/22/2023    Depression Screen  04/20/2024    Diabetic foot exam  06/13/2024    A1C test (Diabetic or Prediabetic)  06/13/2024    Cervical cancer screen  12/06/2027    DTaP/Tdap/Td vaccine (2 - Td or Tdap) 09/24/2031    COVID-19 Vaccine  Completed    Hepatitis C screen  Completed    HIV screen  Completed    Hepatitis A vaccine  Aged Out    Hib vaccine  Aged Out    Meningococcal (ACWY) vaccine  Aged Out    Depression Monitoring  Discontinued       Hemoglobin A1C (%)   Date Value   06/13/2023 6.1   10/04/2022 6.8   05/17/2022 6.2             ( goal A1C is < 7)   No components found for: \"LABMICR\"  LDL Cholesterol (mg/dL)   Date Value   05/23/2022 88       (goal LDL is <100)   AST (U/L)   Date Value   05/11/2018 11     ALT (U/L)   Date Value   05/11/2021 12     BUN (mg/dL)   Date Value   10/04/2022 11     BP Readings from Last 3 Encounters:   06/13/23 112/71   04/20/23 139/84   04/19/23 128/83          (goal 120/80)    All Future Testing planned in [facility]  Lab Frequency Next Occurrence   PAP Smear Once 12/06/2022         Patient Active Problem List:     Hyperlipidemia     Hypertension     Diabetes mellitus ([address])     GERD (gastroesophageal reflux disease)     Obesity, morbid, BMI 50 or higher ([address])

## 2023-10-04 RX ORDER — NAPROXEN 500 MG/1
500 TABLET ORAL 2 TIMES DAILY PRN
Qty: 60 TABLET | Refills: 3 | Status: SHIPPED | OUTPATIENT
Start: 2023-10-04

## 2023-10-16 DIAGNOSIS — F43.0 ACUTE STRESS DISORDER: ICD-10-CM

## 2023-10-16 DIAGNOSIS — Z72.0 TOBACCO ABUSE: ICD-10-CM

## 2023-10-16 DIAGNOSIS — E66.01 OBESITY, MORBID, BMI 50 OR HIGHER (HCC): ICD-10-CM

## 2023-10-16 RX ORDER — BUPROPION HYDROCHLORIDE 150 MG/1
150 TABLET ORAL EVERY MORNING
Qty: 30 TABLET | Refills: 2 | Status: SHIPPED | OUTPATIENT
Start: 2023-10-16

## 2023-10-16 NOTE — TELEPHONE ENCOUNTER
.. Request for   Requested Prescriptions     Pending Prescriptions Disp Refills    buPROPion (WELLBUTRIN XL) 150 MG extended release tablet [Pharmacy Med Name: BUPROPION HCL  MG TABLET] 30 tablet 2     Sig: take 1 tablet by mouth every morning    . Please review and e-scribe to pharmacy listed in chart if appropriate. Thank you. Last Visit Date: 6/13/2023  Next Visit Date: Unable to reach patient to schedule a NTP appointment. Letter mailed.     Future Appointments   Date Time Provider [address]   12/14/2023  1:00 PM Bia Vides MD [facility] OB/Gyn [address] Maintenance   Topic Date Due    Hepatitis B vaccine (1 of 3 - 3-dose series) Never done    Pneumococcal 0-64 years Vaccine (1 - PCV) Never done    HPV vaccine (2 - 3-dose SCDM series) 09/06/2019    Colorectal Cancer Screen  Never done    Lipids  05/23/2023    Diabetic retinal exam  07/11/2023    Flu vaccine (1) 08/01/2023    Diabetic Alb to Cr ratio (uACR) test  10/04/2023    GFR test (Diabetes, CKD 3-4, OR last GFR 15-59)  11/22/2023    Depression Screen  04/20/2024    Diabetic foot exam  06/13/2024    A1C test (Diabetic or Prediabetic)  06/13/2024    Cervical cancer screen  12/06/2027    DTaP/Tdap/Td vaccine (2 - Td or Tdap) 09/24/2031    COVID-19 Vaccine  Completed    Hepatitis C screen  Completed    HIV screen  Completed    Hepatitis A vaccine  Aged Out    Hib vaccine  Aged Out    Meningococcal (ACWY) vaccine  Aged Out    Depression Monitoring  Discontinued       Hemoglobin A1C (%)   Date Value   06/13/2023 6.1   10/04/2022 6.8   05/17/2022 6.2             ( goal A1C is < 7)   No components found for: \"LABMICR\"  LDL Cholesterol (mg/dL)   Date Value   05/23/2022 88       (goal LDL is <100)   AST (U/L)   Date Value   05/11/2018 11     ALT (U/L)   Date Value   05/11/2021 12     BUN (mg/dL)   Date Value   10/04/2022 11     BP Readings from Last 3 Encounters:   06/13/23 112/71   04/20/23 139/84   04/19/23 128/83          (goal

## 2023-10-18 DIAGNOSIS — M54.50 CHRONIC BILATERAL LOW BACK PAIN WITHOUT SCIATICA: ICD-10-CM

## 2023-10-18 DIAGNOSIS — E11.9 TYPE 2 DIABETES MELLITUS WITHOUT COMPLICATION, WITHOUT LONG-TERM CURRENT USE OF INSULIN (HCC): ICD-10-CM

## 2023-10-18 DIAGNOSIS — L73.2 HIDRADENITIS SUPPURATIVA: ICD-10-CM

## 2023-10-18 DIAGNOSIS — G89.29 CHRONIC BILATERAL LOW BACK PAIN WITHOUT SCIATICA: ICD-10-CM

## 2023-10-18 DIAGNOSIS — G89.29 OTHER CHRONIC PAIN: ICD-10-CM

## 2023-10-18 NOTE — TELEPHONE ENCOUNTER
Pt was previously seeing Dr. Iris Bergman. Refill request received for gabapentin. PC to pt to schedule NTP appt in office, pt states she is going to find a doctor elsewhere. Writer made sure pt understands that per office policy we will only send in 30 day supply of medications, then we will no longer prescribe any medications. Pt verbalized understanding of importance of scheduling an appt somewhere soon. Per office policy, 92-ORG supply of meds that have not been refilled in the last month are pended. All scripts are marked to not contact prescribing physician for further refills. Routing to Dr. Trixie Brown who was last to precept.

## 2023-10-20 RX ORDER — DOXYCYCLINE HYCLATE 100 MG/1
CAPSULE ORAL
Qty: 60 CAPSULE | Refills: 0 | Status: SHIPPED | OUTPATIENT
Start: 2023-10-20

## 2023-10-20 RX ORDER — GABAPENTIN 300 MG/1
300 CAPSULE ORAL 3 TIMES DAILY
Qty: 90 CAPSULE | Refills: 0 | Status: SHIPPED | OUTPATIENT
Start: 2023-10-20 | End: 2023-11-19

## 2023-10-20 RX ORDER — TIZANIDINE 4 MG/1
4 TABLET ORAL EVERY 8 HOURS PRN
Qty: 90 TABLET | Refills: 0 | Status: SHIPPED | OUTPATIENT
Start: 2023-10-20

## 2023-10-26 ENCOUNTER — HOSPITAL ENCOUNTER (OUTPATIENT)
Age: 48
Discharge: HOME | End: 2023-10-26
Payer: COMMERCIAL

## 2023-10-26 LAB
ALBUMIN SERPL-MCNC: 4.2 G/DL (ref 3.5–5.2)
ALBUMIN/GLOB SERPL: 1.2 {RATIO} (ref 1–2.5)
ALP SERPL-CCNC: 63 U/L (ref 35–104)
ALT SERPL-CCNC: 14 U/L (ref 5–33)
ANION GAP SERPL CALCULATED.3IONS-SCNC: 12 MMOL/L (ref 9–17)
AST SERPL-CCNC: 12 U/L
BACTERIA URNS QL MICRO: ABNORMAL
BILIRUB DIRECT SERPL-MCNC: 0.1 MG/DL
BILIRUB INDIRECT SERPL-MCNC: 0.2 MG/DL (ref 0–1)
BILIRUB SERPL-MCNC: 0.3 MG/DL (ref 0.3–1.2)
BILIRUB UR QL STRIP: NEGATIVE
BUN SERPL-MCNC: 11 MG/DL (ref 6–20)
CALCIUM SERPL-MCNC: 9 MG/DL (ref 8.6–10.4)
CASTS #/AREA URNS LPF: ABNORMAL /LPF (ref 0–8)
CEA SERPL-MCNC: 0.8 NG/ML
CHLORIDE SERPL-SCNC: 101 MMOL/L (ref 98–107)
CHOLEST SERPL-MCNC: 181 MG/DL
CHOLESTEROL/HDL RATIO: 3.1
CLARITY UR: ABNORMAL
CO2 SERPL-SCNC: 25 MMOL/L (ref 20–31)
COLOR UR: YELLOW
CREAT SERPL-MCNC: 0.5 MG/DL (ref 0.5–0.9)
EPI CELLS #/AREA URNS HPF: ABNORMAL /HPF (ref 0–5)
ERYTHROCYTE [DISTWIDTH] IN BLOOD BY AUTOMATED COUNT: 14.6 % (ref 11.8–14.4)
ERYTHROCYTE [SEDIMENTATION RATE] IN BLOOD BY PHOTOMETRIC METHOD: 42 MM/HR (ref 0–20)
EST. AVERAGE GLUCOSE BLD GHB EST-MCNC: 123 MG/DL
GFR SERPL CREATININE-BSD FRML MDRD: >60 ML/MIN/1.73M2
GLUCOSE SERPL-MCNC: 103 MG/DL (ref 70–99)
GLUCOSE UR STRIP-MCNC: NEGATIVE MG/DL
HAV IGM SERPL QL IA: NONREACTIVE
HBA1C MFR BLD: 5.9 % (ref 4–6)
HBV CORE IGM SERPL QL IA: NONREACTIVE
HBV SURFACE AG SERPL QL IA: NONREACTIVE
HCT VFR BLD AUTO: 41.1 % (ref 36.3–47.1)
HCV AB SERPL QL IA: NONREACTIVE
HDLC SERPL-MCNC: 59 MG/DL
HGB BLD-MCNC: 13.4 G/DL (ref 11.9–15.1)
HGB UR QL STRIP.AUTO: NEGATIVE
IRON SERPL-MCNC: 61 UG/DL (ref 37–145)
KETONES UR STRIP-MCNC: NEGATIVE MG/DL
LDLC SERPL CALC-MCNC: 108 MG/DL (ref 0–130)
LEUKOCYTE ESTERASE UR QL STRIP: ABNORMAL
MCH RBC QN AUTO: 30.7 PG (ref 25.2–33.5)
MCHC RBC AUTO-ENTMCNC: 32.6 G/DL (ref 28.4–34.8)
MCV RBC AUTO: 94.3 FL (ref 82.6–102.9)
NITRITE UR QL STRIP: NEGATIVE
NRBC BLD-RTO: 0 PER 100 WBC
PH UR STRIP: 6 [PH] (ref 5–8)
PLATELET # BLD AUTO: 269 K/UL (ref 138–453)
PMV BLD AUTO: 11.8 FL (ref 8.1–13.5)
POTASSIUM SERPL-SCNC: 4.1 MMOL/L (ref 3.7–5.3)
PROT SERPL-MCNC: 7.6 G/DL (ref 6.4–8.3)
PROT UR STRIP-MCNC: NEGATIVE MG/DL
RBC # BLD AUTO: 4.36 M/UL (ref 3.95–5.11)
RBC #/AREA URNS HPF: ABNORMAL /HPF (ref 0–4)
SODIUM SERPL-SCNC: 138 MMOL/L (ref 135–144)
SP GR UR STRIP: 1.02 (ref 1–1.03)
TRIGL SERPL-MCNC: 70 MG/DL
TSH SERPL DL<=0.05 MIU/L-ACNC: 1.39 UIU/ML (ref 0.3–5)
UROBILINOGEN UR STRIP-ACNC: NORMAL EU/DL (ref 0–1)
VIT B12 SERPL-MCNC: 442 PG/ML (ref 232–1245)
WBC #/AREA URNS HPF: ABNORMAL /HPF (ref 0–5)
WBC OTHER # BLD: 8.3 K/UL (ref 3.5–11.3)

## 2023-10-26 PROCEDURE — 85027 COMPLETE CBC AUTOMATED: CPT

## 2023-10-26 PROCEDURE — 81001 URINALYSIS AUTO W/SCOPE: CPT

## 2023-10-26 PROCEDURE — 83036 HEMOGLOBIN GLYCOSYLATED A1C: CPT

## 2023-10-26 PROCEDURE — 80048 BASIC METABOLIC PNL TOTAL CA: CPT

## 2023-10-26 PROCEDURE — 83540 ASSAY OF IRON: CPT

## 2023-10-26 PROCEDURE — 80061 LIPID PANEL: CPT

## 2023-10-26 PROCEDURE — 84443 ASSAY THYROID STIM HORMONE: CPT

## 2023-10-26 PROCEDURE — 80074 ACUTE HEPATITIS PANEL: CPT

## 2023-10-26 PROCEDURE — 85652 RBC SED RATE AUTOMATED: CPT

## 2023-10-26 PROCEDURE — 82607 VITAMIN B-12: CPT

## 2023-10-26 PROCEDURE — 82378 CARCINOEMBRYONIC ANTIGEN: CPT

## 2023-10-26 PROCEDURE — 36415 COLL VENOUS BLD VENIPUNCTURE: CPT

## 2023-10-26 PROCEDURE — 80076 HEPATIC FUNCTION PANEL: CPT

## 2023-11-02 ENCOUNTER — HOSPITAL ENCOUNTER (EMERGENCY)
Age: 48
Discharge: HOME | End: 2023-11-02
Attending: EMERGENCY MEDICINE
Payer: COMMERCIAL

## 2023-11-02 VITALS
HEART RATE: 91 BPM | TEMPERATURE: 98.8 F | WEIGHT: 287.7 LBS | DIASTOLIC BLOOD PRESSURE: 100 MMHG | BODY MASS INDEX: 56.19 KG/M2 | RESPIRATION RATE: 18 BRPM | OXYGEN SATURATION: 96 % | SYSTOLIC BLOOD PRESSURE: 138 MMHG

## 2023-11-02 DIAGNOSIS — R19.7 NAUSEA VOMITING AND DIARRHEA: Primary | ICD-10-CM

## 2023-11-02 DIAGNOSIS — N30.00 ACUTE CYSTITIS WITHOUT HEMATURIA: ICD-10-CM

## 2023-11-02 DIAGNOSIS — R11.2 NAUSEA VOMITING AND DIARRHEA: Primary | ICD-10-CM

## 2023-11-02 DIAGNOSIS — R19.5 POSITIVE FECAL OCCULT BLOOD TEST: ICD-10-CM

## 2023-11-02 LAB
ALBUMIN SERPL-MCNC: 4.2 G/DL (ref 3.5–5.2)
ALBUMIN/GLOB SERPL: 1.1 {RATIO} (ref 1–2.5)
ALP SERPL-CCNC: 72 U/L (ref 35–104)
ALT SERPL-CCNC: 46 U/L (ref 5–33)
ANION GAP SERPL CALCULATED.3IONS-SCNC: 14 MMOL/L (ref 9–17)
AST SERPL-CCNC: 40 U/L
BACTERIA URNS QL MICRO: ABNORMAL
BASOPHILS # BLD: 0.03 K/UL (ref 0–0.2)
BASOPHILS NFR BLD: 0 % (ref 0–2)
BILIRUB SERPL-MCNC: 0.5 MG/DL (ref 0.3–1.2)
BILIRUB UR QL STRIP: ABNORMAL
BUN SERPL-MCNC: 6 MG/DL (ref 6–20)
CALCIUM SERPL-MCNC: 9.1 MG/DL (ref 8.6–10.4)
CAMPYLOBACTER DNA SPEC NAA+PROBE: NORMAL
CHLORIDE SERPL-SCNC: 97 MMOL/L (ref 98–107)
CLARITY UR: ABNORMAL
CO2 SERPL-SCNC: 22 MMOL/L (ref 20–31)
COLOR UR: ABNORMAL
CREAT SERPL-MCNC: 0.5 MG/DL (ref 0.5–0.9)
EOSINOPHIL # BLD: 0.31 K/UL (ref 0–0.44)
EOSINOPHILS RELATIVE PERCENT: 2 % (ref 1–4)
EPI CELLS #/AREA URNS HPF: ABNORMAL /HPF (ref 0–5)
ERYTHROCYTE [DISTWIDTH] IN BLOOD BY AUTOMATED COUNT: 14.5 % (ref 11.8–14.4)
ETEC ELTA+ESTB GENES STL QL NAA+PROBE: NORMAL
GFR SERPL CREATININE-BSD FRML MDRD: >60 ML/MIN/1.73M2
GLUCOSE SERPL-MCNC: 116 MG/DL (ref 70–99)
GLUCOSE UR STRIP-MCNC: NEGATIVE MG/DL
HCG SERPL QL: NEGATIVE
HCT VFR BLD AUTO: 44.3 % (ref 36.3–47.1)
HGB BLD-MCNC: 14.4 G/DL (ref 11.9–15.1)
HGB UR QL STRIP.AUTO: NEGATIVE
IMM GRANULOCYTES # BLD AUTO: 0.18 K/UL (ref 0–0.3)
IMM GRANULOCYTES NFR BLD: 1 %
KETONES UR STRIP-MCNC: ABNORMAL MG/DL
LEUKOCYTE ESTERASE UR QL STRIP: ABNORMAL
LIPASE SERPL-CCNC: 18 U/L (ref 13–60)
LYMPHOCYTES NFR BLD: 2.11 K/UL (ref 1.1–3.7)
LYMPHOCYTES RELATIVE PERCENT: 17 % (ref 24–43)
MAGNESIUM SERPL-MCNC: 2 MG/DL (ref 1.6–2.6)
MCH RBC QN AUTO: 30.8 PG (ref 25.2–33.5)
MCHC RBC AUTO-ENTMCNC: 32.5 G/DL (ref 28.4–34.8)
MCV RBC AUTO: 94.7 FL (ref 82.6–102.9)
MONOCYTES NFR BLD: 0.65 K/UL (ref 0.1–1.2)
MONOCYTES NFR BLD: 5 % (ref 3–12)
MUCOUS THREADS URNS QL MICRO: ABNORMAL
NEUTROPHILS NFR BLD: 75 % (ref 36–65)
NEUTS SEG NFR BLD: 9.39 K/UL (ref 1.5–8.1)
NITRITE UR QL STRIP: NEGATIVE
NRBC BLD-RTO: 0 PER 100 WBC
P SHIGELLOIDES DNA STL QL NAA+PROBE: NORMAL
PH UR STRIP: 6 [PH] (ref 5–8)
PLATELET # BLD AUTO: 283 K/UL (ref 138–453)
PMV BLD AUTO: 11.7 FL (ref 8.1–13.5)
POTASSIUM SERPL-SCNC: 3.5 MMOL/L (ref 3.7–5.3)
PROT SERPL-MCNC: 8.1 G/DL (ref 6.4–8.3)
PROT UR STRIP-MCNC: ABNORMAL MG/DL
RBC # BLD AUTO: 4.68 M/UL (ref 3.95–5.11)
RBC # BLD: ABNORMAL 10*6/UL
RBC #/AREA URNS HPF: ABNORMAL /HPF (ref 0–2)
SALMONELLA DNA SPEC QL NAA+PROBE: NORMAL
SHIGA TOXIN STX GENE SPEC NAA+PROBE: NORMAL
SHIGELLA DNA SPEC QL NAA+PROBE: NORMAL
SODIUM SERPL-SCNC: 133 MMOL/L (ref 135–144)
SP GR UR STRIP: 1.03 (ref 1–1.03)
SPECIMEN DESCRIPTION: NORMAL
UROBILINOGEN UR STRIP-ACNC: NORMAL EU/DL (ref 0–1)
V CHOL+PARA RFBL+TRKH+TNAA STL QL NAA+PR: NORMAL
WBC #/AREA URNS HPF: ABNORMAL /HPF (ref 0–5)
WBC OTHER # BLD: 12.7 K/UL (ref 3.5–11.3)
Y ENTERO RECN STL QL NAA+PROBE: NORMAL

## 2023-11-02 PROCEDURE — 87449 NOS EACH ORGANISM AG IA: CPT

## 2023-11-02 PROCEDURE — 83735 ASSAY OF MAGNESIUM: CPT

## 2023-11-02 PROCEDURE — 6360000002 HC RX W HCPCS

## 2023-11-02 PROCEDURE — 2580000003 HC RX 258

## 2023-11-02 PROCEDURE — 87506 IADNA-DNA/RNA PROBE TQ 6-11: CPT

## 2023-11-02 PROCEDURE — 87086 URINE CULTURE/COLONY COUNT: CPT

## 2023-11-02 PROCEDURE — 83690 ASSAY OF LIPASE: CPT

## 2023-11-02 PROCEDURE — 80053 COMPREHEN METABOLIC PANEL: CPT

## 2023-11-02 PROCEDURE — 87324 CLOSTRIDIUM AG IA: CPT

## 2023-11-02 PROCEDURE — 99284 EMERGENCY DEPT VISIT MOD MDM: CPT

## 2023-11-02 PROCEDURE — 81001 URINALYSIS AUTO W/SCOPE: CPT

## 2023-11-02 PROCEDURE — 84703 CHORIONIC GONADOTROPIN ASSAY: CPT

## 2023-11-02 PROCEDURE — 96374 THER/PROPH/DIAG INJ IV PUSH: CPT

## 2023-11-02 PROCEDURE — 6370000000 HC RX 637 (ALT 250 FOR IP)

## 2023-11-02 PROCEDURE — 85025 COMPLETE CBC W/AUTO DIFF WBC: CPT

## 2023-11-02 RX ORDER — ONDANSETRON 2 MG/ML
4 INJECTION INTRAMUSCULAR; INTRAVENOUS ONCE
Status: COMPLETED | OUTPATIENT
Start: 2023-11-02 | End: 2023-11-02

## 2023-11-02 RX ORDER — ACETAMINOPHEN 500 MG
1000 TABLET ORAL ONCE
Status: COMPLETED | OUTPATIENT
Start: 2023-11-02 | End: 2023-11-02

## 2023-11-02 RX ORDER — CEPHALEXIN 500 MG/1
500 CAPSULE ORAL ONCE
Status: COMPLETED | OUTPATIENT
Start: 2023-11-02 | End: 2023-11-02

## 2023-11-02 RX ORDER — DICYCLOMINE HYDROCHLORIDE 10 MG/1
20 CAPSULE ORAL ONCE
Status: COMPLETED | OUTPATIENT
Start: 2023-11-02 | End: 2023-11-02

## 2023-11-02 RX ORDER — DICYCLOMINE HCL 20 MG
20 TABLET ORAL EVERY 6 HOURS PRN
Qty: 20 TABLET | Refills: 0 | Status: SHIPPED | OUTPATIENT
Start: 2023-11-02 | End: 2023-11-07

## 2023-11-02 RX ORDER — POTASSIUM CHLORIDE 20 MEQ/1
40 TABLET, EXTENDED RELEASE ORAL ONCE
Status: COMPLETED | OUTPATIENT
Start: 2023-11-02 | End: 2023-11-02

## 2023-11-02 RX ORDER — CEPHALEXIN 500 MG/1
500 CAPSULE ORAL 2 TIMES DAILY
Qty: 14 CAPSULE | Refills: 0 | Status: SHIPPED | OUTPATIENT
Start: 2023-11-02 | End: 2023-11-09

## 2023-11-02 RX ORDER — 0.9 % SODIUM CHLORIDE 0.9 %
1000 INTRAVENOUS SOLUTION INTRAVENOUS ONCE
Status: COMPLETED | OUTPATIENT
Start: 2023-11-02 | End: 2023-11-02

## 2023-11-02 RX ADMIN — CEPHALEXIN 500 MG: 500 CAPSULE ORAL at 10:55

## 2023-11-02 RX ADMIN — ACETAMINOPHEN 1000 MG: 500 TABLET ORAL at 09:05

## 2023-11-02 RX ADMIN — ONDANSETRON 4 MG: 2 INJECTION INTRAMUSCULAR; INTRAVENOUS at 09:05

## 2023-11-02 RX ADMIN — DICYCLOMINE HYDROCHLORIDE 20 MG: 10 CAPSULE ORAL at 09:05

## 2023-11-02 RX ADMIN — POTASSIUM CHLORIDE 40 MEQ: 1500 TABLET, EXTENDED RELEASE ORAL at 10:55

## 2023-11-02 RX ADMIN — SODIUM CHLORIDE 1000 ML: 9 INJECTION, SOLUTION INTRAVENOUS at 09:05

## 2023-11-02 ASSESSMENT — ENCOUNTER SYMPTOMS
ABDOMINAL PAIN: 1
DIARRHEA: 1
VOMITING: 1
NAUSEA: 1
SHORTNESS OF BREATH: 0
BLOOD IN STOOL: 1

## 2023-11-02 NOTE — ED NOTES
Writer at bedside to chaperone rectal exam by Dr. Brielle Chinchilla. Fecal occult exam positive.       Cornelious Kussmaul, RN  11/02/23 1655

## 2023-11-02 NOTE — ED NOTES
Patient registered with the social security number given to nurse. Patient verified identity with 2 identifying factors.        Fauzia Albarran  11/02/23 0318

## 2023-11-02 NOTE — ED NOTES
Pt presents to the ER via triage ambulatory. Pt c/o N/V/D since yesterday after eating [employer]. Pt believes she has food poisoning. Pt states in her bowel movement she noticed some bleeding and clots in her stool. Pt otherwise A&O x4, in NAD, VSS. Call light within reach.       Raissa Rodriguez RN  11/02/23 2355

## 2023-11-02 NOTE — DISCHARGE INSTRUCTIONS
You were seen in the emergency department for concerns for food poisoning as you had multiple episodes of diarrhea and vomiting after eating potatoes at [facility]. Your vital signs were stable. No fever noted. Your electrolytes were within normal limits. Your urine was suspicious for urinary tract infection. We will treat you for a UTI at this time. First dose of Keflex was given in the emergency department. Plan for discharge with Keflex. Please take as prescribed and complete the entire antibiotic course. We will also discharge you with some Bentyl for abdominal cramping. Please take as needed. You may also take Tylenol or ibuprofen in addition if the Bentyl is not helping. Be sure to stay well-hydrated. You also were complaining of some blood in your stools. This may be due to the diarrhea. We did send some stool studies but those results will not come back today. Please follow-up with your primary care provider in the next week for review of these. We did a rectal exam and you did have a positive fecal occult blood test.  You will need to speak with your primary care provider about scheduling a colonoscopy as soon as possible. Please return to the emergency department if your symptoms worsen or if you develop any fevers, dizziness, or lightheadedness.

## 2023-11-02 NOTE — ED NOTES
The following labs were labeled with appropriate pt sticker and tubed to lab:     [] Blue     [] Lavender   [] on ice  [] Green/yellow  [] Green/black [] on ice  [] Ahmad Lanette  [] on ice  [] Yellow  [] Red  [] Pink  [] Type/ Screen  [] ABG  [] VBG    [] COVID-19 swab    [] Rapid  [] PCR  [] Flu swab  [] Peds Viral Panel     [x] Urine Sample  [x] Fecal Sample  [] Pelvic Cultures  [] Blood Cultures  [] X 2  [] STREP Cultures       Maribel Whitlock RN  11/02/23 0900

## 2023-11-02 NOTE — ED NOTES
The following labs were labeled with appropriate pt sticker and tubed to lab:     [x] Blue     [x] Lavender   [] on ice  [x] Green/yellow  [x] Green/black [] on ice  [] Madonna Kitten  [] on ice  [] Yellow  [] Red  [] Pink  [] Type/ Screen  [] ABG  [] VBG    [] COVID-19 swab    [] Rapid  [] PCR  [] Flu swab  [] Peds Viral Panel     [] Urine Sample  [] Fecal Sample  [] Pelvic Cultures  [] Blood Cultures  [] X 2  [] STREP Cultures       Kira Odell RN  11/02/23 5327

## 2023-11-02 NOTE — ED PROVIDER NOTES
[address] [place] [address] ED  Emergency Department Encounter  Emergency Medicine Resident     Pt Robert Sevilla  MRN: [id number]  [address] [facility] Saw 1975  Date of evaluation: 11/2/23  PCP:  No primary care provider on file. Note Started: 9:16 AM EDT      CHIEF COMPLAINT       Chief Complaint   Patient presents with    Nausea    Emesis    Diarrhea    Rectal Bleeding       HISTORY OF PRESENT ILLNESS  (Location/Symptom, Timing/Onset, Context/Setting, Quality, Duration, Modifying Factors, Severity.)      Penny Ruth is a 41 y.o. female who presents with multiple episodes of diarrhea and vomiting. Patient states she ate potatoes from [employer] yesterday and started having diarrhea and vomiting a couple hours later. She states she has had \"14 episodes of diarrhea and 5-6 episodes of vomiting\". Patient did notice some blood in her stools as well. Denies any blood in her vomit. She is complaining of some abdominal cramping that she localizes to the suprapubic region. She denies any fevers or chills. She has not had a period for about a year. Denies any abnormal vaginal bleeding or vaginal discharge. Denies any dysuria, hematuria, or increased urinary frequency. Patient has had a tubal ligation as well as multiple D&Cs. She recently has been taking doxycycline and clindamycin for her hidradenitis suppurativa. PAST MEDICAL / SURGICAL / SOCIAL / FAMILY HISTORY      has a past medical history of Abnormal uterine bleeding (AUB), Anemia, Arthritis, Asthma, Diabetes mellitus ([address]), GERD (gastroesophageal reflux disease), Hidradenitis suppurativa, History of blood transfusion, Hyperlipidemia, Hypertension, Leg swelling, Obesity, morbid, BMI 50 or higher ([address]), Sleep apnea, Wears dentures, and Wellness examination. has a past surgical history that includes Tubal ligation (1997); Abscess Drainage (12/23/2016); pr unlisted procedure rectum (N/A, 07/02/2018);  Dilation and curettage of uterus was positive. We will check labs to ensure there are no electrolyte abnormalities. Symptomatic treatment with IV fluids, antiemetics, and analgesics. Amount and/or Complexity of Data Reviewed  External Data Reviewed: labs and notes. Labs: ordered. Decision-making details documented in ED Course. Risk  OTC drugs. Prescription drug management. Critical Care  Total time providing critical care: 0 minutes        EMERGENCY DEPARTMENT COURSE:  ED Course as of 11/02/23 1054   Thu Nov 02, 2023   0931 WBC(!): 12.7 [KR]   0931 Hemoglobin Quant: 14.4 [KR]   0931 Potassium(!): 3.5 [KR]   0931 Magnesium: 2.0 [KR]   0931 Lipase: 18 [KR]   1039 Nitrite, Urine: NEGATIVE [KR]   1039 Leukocyte Esterase, Urine(!): SMALL [KR]   1039 WBC, UA: 10 TO 20 [KR]   1039 Epithelial Cells, UA: 50  [KR]   1039 Bacteria, UA(!): MODERATE [KR]   1047 Reevaluated patient. She is feeling a little bit better. She would like to go home at this time and sleep. Patient is still complaining of some mild suprapubic abdominal pain. Urine looks suspicious for UTI but does look contaminated. Given the suprapubic abdominal pain. We will treat at this time. [KR]      ED Course User Index  [KR] Robb Leach MD       CONSULTS:  None    CRITICAL CARE:  There was significant risk of life threatening deterioration of patient's condition requiring my direct management. Critical care time 0 minutes, excluding any documented procedures. FINAL IMPRESSION      1. Nausea vomiting and diarrhea    2. Acute cystitis without hematuria    3.  Positive fecal occult blood test          DISPOSITION / PLAN     DISPOSITION Decision To Discharge 11/02/2023 10:49:11 AM      PATIENT REFERRED TO:  Your primary care provider    Schedule an appointment as soon as possible for a visit in 1 week  Follow-up positive FOBT, UTI, nausea/vomiting/diarrhea      DISCHARGE MEDICATIONS:  New Prescriptions    CEPHALEXIN (KEFLEX) 500 MG CAPSULE    Take 1 capsule by mouth

## 2023-11-03 LAB
C DIFF GDH + TOXINS A+B STL QL IA.RAPID: NEGATIVE
MICROORGANISM SPEC CULT: NORMAL
SPECIMEN DESCRIPTION: NORMAL
SPECIMEN DESCRIPTION: NORMAL

## 2023-12-13 ENCOUNTER — HOSPITAL ENCOUNTER (OUTPATIENT)
Age: 48
Setting detail: SPECIMEN
Discharge: HOME | End: 2023-12-13

## 2023-12-13 ENCOUNTER — OFFICE VISIT (OUTPATIENT)
Dept: OBGYN | Age: 48
End: 2023-12-13
Payer: COMMERCIAL

## 2023-12-13 VITALS
BODY MASS INDEX: 57.33 KG/M2 | WEIGHT: 292 LBS | HEIGHT: 60 IN | SYSTOLIC BLOOD PRESSURE: 151 MMHG | DIASTOLIC BLOOD PRESSURE: 104 MMHG | HEART RATE: 89 BPM

## 2023-12-13 DIAGNOSIS — Z12.31 ENCOUNTER FOR SCREENING MAMMOGRAM FOR MALIGNANT NEOPLASM OF BREAST: ICD-10-CM

## 2023-12-13 DIAGNOSIS — Z11.3 SCREENING FOR STD (SEXUALLY TRANSMITTED DISEASE): ICD-10-CM

## 2023-12-13 DIAGNOSIS — L73.2 HIDRADENITIS SUPPURATIVA: Chronic | ICD-10-CM

## 2023-12-13 DIAGNOSIS — Z01.419 WELL WOMAN EXAM WITH ROUTINE GYNECOLOGICAL EXAM: Primary | ICD-10-CM

## 2023-12-13 PROCEDURE — 3074F SYST BP LT 130 MM HG: CPT

## 2023-12-13 PROCEDURE — 99386 PREV VISIT NEW AGE 40-64: CPT

## 2023-12-13 PROCEDURE — 99211 OFF/OP EST MAY X REQ PHY/QHP: CPT

## 2023-12-13 PROCEDURE — G8484 FLU IMMUNIZE NO ADMIN: HCPCS

## 2023-12-13 PROCEDURE — 3078F DIAST BP <80 MM HG: CPT

## 2023-12-13 RX ORDER — POTASSIUM CHLORIDE 20 MEQ/1
20 TABLET, EXTENDED RELEASE ORAL DAILY
COMMUNITY
Start: 2023-11-03

## 2023-12-13 NOTE — PROGRESS NOTES
[facility] OB/GYN Annual Visit    Isaiah Cornejo  12/13/2023                       Primary Care Physician: No primary care provider on file. CC:   Chief Complaint   Patient presents with    Annual Exam     annual         HPI: Isaiah Cornejo is a 57 y.o. female [id number]    The patient was seen and examined. She is here for an annual visit. She is complaining of flares of her hidradenitis in her left groin and under arms, otherwise has no additional complaints. Patient's last menstrual period was 09/07/2021 (approximate). . She reports irregular and light menses since her endometrial ablation in 2021. Her bowel habits are regular. She denies any bloating. She denies dysuria. She denies urinary leaking. She denies vaginal discharge. She is sexually active with single partner, contraception - tubal ligation. Patient reports history of homelessness for 2 years. Reports living here and there. She is currently unemployed and tearful. She states that she is in the process of trying to apply for housing but has been placed on a wait list. Patient does not want to discuss further and declines any social work intervention to provide resources at this time.      REVIEW OF SYSTEMS:   An 11 point review of systems was completed    Constitutional: negative fever, negative chills  HEENT: negative visual disturbances, negative headaches  Respiratory: negative dyspnea, negative cough  Cardiovascular: negative chest pain,  negative palpitations  Gastrointestinal: negative abdominal pain, negative RUQ pain, negative N/V, negative diarrhea, negative constipation  Genitourinary: negative dysuria, negative vaginal discharge, negative vaginal bleeding  Dermatological: negative rash  Hematologic: negative bruising  Immunologic/Lymphatic: negative recent illness, negative recent sick contact  Musculoskeletal: negative back pain, negative myalgias, negative arthralgias  Neurological:  negative dizziness, negative

## 2023-12-14 LAB
CANDIDA SPECIES: NEGATIVE
GARDNERELLA VAGINALIS: POSITIVE
HAV IGM SERPL QL IA: NONREACTIVE
HBV CORE IGM SERPL QL IA: NONREACTIVE
HBV SURFACE AG SERPL QL IA: NONREACTIVE
HCV AB SERPL QL IA: NONREACTIVE
HIV 1+2 AB+HIV1 P24 AG SERPL QL IA: NONREACTIVE
SOURCE: ABNORMAL
T PALLIDUM AB SER QL IA: NONREACTIVE
TRICHOMONAS: NEGATIVE

## 2023-12-14 NOTE — PROGRESS NOTES
Attending Physician Statement  I have discussed the care of Dakota Linda, including pertinent history and exam findings,  with the resident. I have reviewed the key elements of all parts of the encounter with the resident. I agree with the assessment, plan and orders as documented by the resident.   (GE Modifier)    Emmy Red,

## 2023-12-15 DIAGNOSIS — N76.0 BV (BACTERIAL VAGINOSIS): Primary | ICD-10-CM

## 2023-12-15 DIAGNOSIS — B96.89 BV (BACTERIAL VAGINOSIS): Primary | ICD-10-CM

## 2023-12-15 LAB
C TRACH DNA SPEC QL PROBE+SIG AMP: NEGATIVE
N GONORRHOEA DNA SPEC QL PROBE+SIG AMP: NEGATIVE
SPECIMEN DESCRIPTION: NORMAL

## 2023-12-15 RX ORDER — METRONIDAZOLE 500 MG/1
500 TABLET ORAL 2 TIMES DAILY
Qty: 14 TABLET | Refills: 0 | Status: SHIPPED | OUTPATIENT
Start: 2023-12-15 | End: 2023-12-22

## 2023-12-30 DIAGNOSIS — E11.9 TYPE 2 DIABETES MELLITUS WITHOUT COMPLICATION, WITHOUT LONG-TERM CURRENT USE OF INSULIN (HCC): Chronic | ICD-10-CM

## 2024-01-03 RX ORDER — DULAGLUTIDE 0.75 MG/.5ML
INJECTION, SOLUTION SUBCUTANEOUS
Qty: 2 ML | Refills: 1 | OUTPATIENT
Start: 2024-01-03

## 2024-01-10 DIAGNOSIS — F43.0 ACUTE STRESS DISORDER: ICD-10-CM

## 2024-01-10 DIAGNOSIS — Z72.0 TOBACCO ABUSE: ICD-10-CM

## 2024-01-10 DIAGNOSIS — E66.01 OBESITY, MORBID, BMI 50 OR HIGHER (HCC): ICD-10-CM

## 2024-01-10 RX ORDER — BUPROPION HYDROCHLORIDE 150 MG/1
150 TABLET ORAL EVERY MORNING
Qty: 30 TABLET | Refills: 2 | OUTPATIENT
Start: 2024-01-10

## 2024-02-07 ENCOUNTER — HOSPITAL ENCOUNTER (OUTPATIENT)
Dept: WOMENS IMAGING | Age: 49
Discharge: HOME | End: 2024-02-09
Payer: COMMERCIAL

## 2024-02-07 DIAGNOSIS — Z01.419 WELL WOMAN EXAM WITH ROUTINE GYNECOLOGICAL EXAM: ICD-10-CM

## 2024-02-07 DIAGNOSIS — Z12.31 ENCOUNTER FOR SCREENING MAMMOGRAM FOR MALIGNANT NEOPLASM OF BREAST: ICD-10-CM

## 2024-02-07 PROCEDURE — 77063 BREAST TOMOSYNTHESIS BI: CPT

## 2024-02-09 DIAGNOSIS — I10 ESSENTIAL HYPERTENSION: ICD-10-CM

## 2024-02-09 RX ORDER — AMLODIPINE BESYLATE 10 MG/1
TABLET ORAL
Qty: 30 TABLET | Refills: 5 | OUTPATIENT
Start: 2024-02-09

## 2024-04-04 ENCOUNTER — OFFICE VISIT (OUTPATIENT)
Dept: OBGYN | Age: 49
End: 2024-04-04
Payer: COMMERCIAL

## 2024-04-04 ENCOUNTER — HOSPITAL ENCOUNTER (OUTPATIENT)
Age: 49
Setting detail: SPECIMEN
Discharge: HOME | End: 2024-04-04

## 2024-04-04 VITALS
BODY MASS INDEX: 58 KG/M2 | SYSTOLIC BLOOD PRESSURE: 128 MMHG | HEART RATE: 72 BPM | DIASTOLIC BLOOD PRESSURE: 80 MMHG | WEIGHT: 293 LBS

## 2024-04-04 DIAGNOSIS — N39.46 MIXED STRESS AND URGE URINARY INCONTINENCE: Primary | ICD-10-CM

## 2024-04-04 DIAGNOSIS — L73.2 HIDRADENITIS SUPPURATIVA: Chronic | ICD-10-CM

## 2024-04-04 DIAGNOSIS — N39.46 MIXED STRESS AND URGE URINARY INCONTINENCE: ICD-10-CM

## 2024-04-04 PROCEDURE — 99211 OFF/OP EST MAY X REQ PHY/QHP: CPT

## 2024-04-04 RX ORDER — DOXYCYCLINE HYCLATE 100 MG
100 TABLET ORAL 2 TIMES DAILY
Qty: 60 TABLET | Refills: 3 | Status: SHIPPED | OUTPATIENT
Start: 2024-04-04 | End: 2024-05-04

## 2024-04-04 RX ORDER — CLINDAMYCIN PHOSPHATE 10 UG/ML
LOTION TOPICAL
Qty: 60 G | Refills: 3 | Status: SHIPPED | OUTPATIENT
Start: 2024-04-04 | End: 2024-05-04

## 2024-04-05 LAB
MICROORGANISM SPEC CULT: NORMAL
SPECIMEN DESCRIPTION: NORMAL

## 2024-04-05 NOTE — PROGRESS NOTES
Attending Physician Statement  I have discussed the care of Lennie Johnson, including pertinent history and exam findings,  with the resident. I have reviewed the key elements of all parts of the encounter with the resident.  I agree with the assessment, plan and orders as documented by the resident.  (GE Modifier)    Risa Michelle,    
on bladder symptoms.    Counseling Completed:    Tobacco & Secondary smoke risks discussed; with recommendation for cessation and avoidance.  Routine health maintenance per patients PCP discussed.    Patient was seen with total face to face time of 25 minutes. More than 50% of this visit was on counseling and education regarding her diagnose(s) as listed below and options. She was also counseled on her preventative health maintenance recommendations and follow-up.      Diagnosis Orders   1. Mixed stress and urge urinary incontinence  [facility] Sukhdeep Jordan DO, Weight Management and Bariatrics, [facility] Physical Therapy - [facility]    Culture, Urine      2. Hidradenitis suppurativa  clindamycin (CLEOCIN-T) 1 % lotion    doxycycline hyclate (VIBRA-TABS) 100 MG tablet        Doris Foote MD  Ob/Gyn Resident  [facility] OB/GYN, [facility]  4/4/2024, 4:54 PM

## 2024-05-22 ENCOUNTER — OFFICE VISIT (OUTPATIENT)
Dept: OBGYN | Age: 49
End: 2024-05-22
Payer: COMMERCIAL

## 2024-05-22 ENCOUNTER — FOLLOWUP TELEPHONE ENCOUNTER (OUTPATIENT)
Dept: OBGYN | Age: 49
End: 2024-05-22

## 2024-05-22 VITALS
DIASTOLIC BLOOD PRESSURE: 76 MMHG | BODY MASS INDEX: 57.52 KG/M2 | HEART RATE: 69 BPM | HEIGHT: 60 IN | WEIGHT: 293 LBS | SYSTOLIC BLOOD PRESSURE: 122 MMHG

## 2024-05-22 DIAGNOSIS — N39.3 STRESS INCONTINENCE: Primary | ICD-10-CM

## 2024-05-22 DIAGNOSIS — K59.00 CONSTIPATION, UNSPECIFIED CONSTIPATION TYPE: ICD-10-CM

## 2024-05-22 DIAGNOSIS — Z13.31 POSITIVE DEPRESSION SCREENING: ICD-10-CM

## 2024-05-22 PROCEDURE — G8427 DOCREV CUR MEDS BY ELIG CLIN: HCPCS

## 2024-05-22 PROCEDURE — 4004F PT TOBACCO SCREEN RCVD TLK: CPT

## 2024-05-22 PROCEDURE — 3078F DIAST BP <80 MM HG: CPT

## 2024-05-22 PROCEDURE — 99213 OFFICE O/P EST LOW 20 MIN: CPT

## 2024-05-22 PROCEDURE — 99211 OFF/OP EST MAY X REQ PHY/QHP: CPT

## 2024-05-22 PROCEDURE — 3074F SYST BP LT 130 MM HG: CPT

## 2024-05-22 PROCEDURE — G8417 CALC BMI ABV UP PARAM F/U: HCPCS

## 2024-05-22 RX ORDER — LISINOPRIL 10 MG/1
10 TABLET ORAL DAILY
COMMUNITY
Start: 2024-04-08

## 2024-05-22 RX ORDER — SENNOSIDES 8.6 MG
1 TABLET ORAL DAILY
Qty: 30 TABLET | Refills: 12 | Status: SHIPPED | OUTPATIENT
Start: 2024-05-22 | End: 2024-06-21

## 2024-05-22 ASSESSMENT — COLUMBIA-SUICIDE SEVERITY RATING SCALE - C-SSRS
2. HAVE YOU ACTUALLY HAD ANY THOUGHTS OF KILLING YOURSELF?: NO
6. HAVE YOU EVER DONE ANYTHING, STARTED TO DO ANYTHING, OR PREPARED TO DO ANYTHING TO END YOUR LIFE?: NO
1. WITHIN THE PAST MONTH, HAVE YOU WISHED YOU WERE DEAD OR WISHED YOU COULD GO TO SLEEP AND NOT WAKE UP?: YES

## 2024-05-22 ASSESSMENT — PATIENT HEALTH QUESTIONNAIRE - PHQ9
SUM OF ALL RESPONSES TO PHQ QUESTIONS 1-9: 0
6. FEELING BAD ABOUT YOURSELF - OR THAT YOU ARE A FAILURE OR HAVE LET YOURSELF OR YOUR FAMILY DOWN: NEARLY EVERY DAY
7. TROUBLE CONCENTRATING ON THINGS, SUCH AS READING THE NEWSPAPER OR WATCHING TELEVISION: NEARLY EVERY DAY
SUM OF ALL RESPONSES TO PHQ QUESTIONS 1-9: 0
1. LITTLE INTEREST OR PLEASURE IN DOING THINGS: NOT AT ALL
5. POOR APPETITE OR OVEREATING: NEARLY EVERY DAY
SUM OF ALL RESPONSES TO PHQ QUESTIONS 1-9: 17
2. FEELING DOWN, DEPRESSED OR HOPELESS: NEARLY EVERY DAY
SUM OF ALL RESPONSES TO PHQ QUESTIONS 1-9: 20
8. MOVING OR SPEAKING SO SLOWLY THAT OTHER PEOPLE COULD HAVE NOTICED. OR THE OPPOSITE, BEING SO FIGETY OR RESTLESS THAT YOU HAVE BEEN MOVING AROUND A LOT MORE THAN USUAL: NOT AT ALL
SUM OF ALL RESPONSES TO PHQ9 QUESTIONS 1 & 2: 0
SUM OF ALL RESPONSES TO PHQ QUESTIONS 1-9: 20
SUM OF ALL RESPONSES TO PHQ QUESTIONS 1-9: 20
2. FEELING DOWN, DEPRESSED OR HOPELESS: NOT AT ALL
SUM OF ALL RESPONSES TO PHQ9 QUESTIONS 1 & 2: 6
4. FEELING TIRED OR HAVING LITTLE ENERGY: SEVERAL DAYS
3. TROUBLE FALLING OR STAYING ASLEEP: SEVERAL DAYS
SUM OF ALL RESPONSES TO PHQ QUESTIONS 1-9: 0
9. THOUGHTS THAT YOU WOULD BE BETTER OFF DEAD, OR OF HURTING YOURSELF: NEARLY EVERY DAY
10. IF YOU CHECKED OFF ANY PROBLEMS, HOW DIFFICULT HAVE THESE PROBLEMS MADE IT FOR YOU TO DO YOUR WORK, TAKE CARE OF THINGS AT HOME, OR GET ALONG WITH OTHER PEOPLE: EXTREMELY DIFFICULT
SUM OF ALL RESPONSES TO PHQ QUESTIONS 1-9: 0

## 2024-05-23 NOTE — PROGRESS NOTES
Attending Physician Statement  I have discussed the care of Lennie Johnson, including pertinent history and exam findings,  with the resident. I have reviewed the key elements of all parts of the encounter with the resident.  I agree with the assessment, plan and orders as documented by the resident.  (GE Modifier)    Risa Michelle,    
(HCC) 12/23/2016    Hidradenitis suppurativa 12/23/2016    DALILA (obstructive sleep apnea) 12/23/2016     CPAP use at 16      Perirectal abscess 12/23/2016    Iron deficiency anemia 12/23/2016    Dysmenorrhea 12/23/2016    Menorrhagia 12/23/2016    Acute cystitis without hematuria 12/23/2016     Return in about 6 months (around 11/22/2024) for Follow up on stress incontinence symptoms.    Counseling Completed:    Tobacco & Secondary smoke risks discussed; with recommendation for cessation and avoidance.  Routine health maintenance per patients PCP discussed.    Patient was seen with total face to face time of 25 minutes. More than 50% of this visit was on counseling and education regarding her diagnose(s) as listed below and options. She was also counseled on her preventative health maintenance recommendations and follow-up.      Diagnosis Orders   1. Stress incontinence        2. Constipation, unspecified constipation type  senna (SENOKOT) 8.6 MG TABS tablet      3. Positive depression screening          Doris Foote MD  Ob/Gyn Resident  [facility] OB/GYN, [facility]  5/22/2024, 4:30 PM  PHQ-9 score today: (PHQ-9 Total Score: 20), additional evaluation and assessment performed, follow-up plan includes but not limited to: Medication management and Referral to /Specialist  for evaluation and management.

## 2024-05-30 NOTE — TELEPHONE ENCOUNTER
SW met with Pt to discuss issues with food and housing insecurity, mental health and finances. Pt was upset about her current situation and dealing with shelters. Pt was able to assist with obtaining recourses tor these areas and referred pt to [facility] for further assistance with financial literacy. Pt will follow up as needed.

## 2024-06-22 ENCOUNTER — HOSPITAL ENCOUNTER (OUTPATIENT)
Age: 49
Discharge: HOME | End: 2024-06-22
Payer: COMMERCIAL

## 2024-06-22 LAB
25(OH)D3 SERPL-MCNC: 9.4 NG/ML (ref 30–100)
ALBUMIN SERPL-MCNC: 4.3 G/DL (ref 3.5–5.2)
ALBUMIN/GLOB SERPL: 1 {RATIO} (ref 1–2.5)
ALP SERPL-CCNC: 67 U/L (ref 35–104)
ALT SERPL-CCNC: 12 U/L (ref 10–35)
ANION GAP SERPL CALCULATED.3IONS-SCNC: 11 MMOL/L (ref 9–16)
AST SERPL-CCNC: 20 U/L (ref 10–35)
BILIRUB DIRECT SERPL-MCNC: <0.2 MG/DL (ref 0–0.3)
BILIRUB INDIRECT SERPL-MCNC: NORMAL MG/DL (ref 0–1)
BILIRUB SERPL-MCNC: 0.2 MG/DL (ref 0–1.2)
BUN SERPL-MCNC: 11 MG/DL (ref 6–20)
CALCIUM SERPL-MCNC: 9.1 MG/DL (ref 8.6–10.4)
CEA SERPL-MCNC: 0.9 NG/ML (ref 0–3.8)
CHLORIDE SERPL-SCNC: 105 MMOL/L (ref 98–107)
CHOLEST SERPL-MCNC: 173 MG/DL (ref 0–199)
CHOLESTEROL/HDL RATIO: 4
CO2 SERPL-SCNC: 24 MMOL/L (ref 20–31)
CREAT SERPL-MCNC: 0.6 MG/DL (ref 0.5–0.9)
CREAT UR-MCNC: 155 MG/DL (ref 28–217)
ERYTHROCYTE [DISTWIDTH] IN BLOOD BY AUTOMATED COUNT: 15.3 % (ref 11.8–14.4)
ERYTHROCYTE [SEDIMENTATION RATE] IN BLOOD BY PHOTOMETRIC METHOD: 60 MM/HR (ref 0–20)
GFR, ESTIMATED: >90 ML/MIN/1.73M2
GLOBULIN SER CALC-MCNC: 3.2 G/DL
GLUCOSE SERPL-MCNC: 108 MG/DL (ref 74–99)
HCT VFR BLD AUTO: 38.3 % (ref 36.3–47.1)
HDLC SERPL-MCNC: 44 MG/DL
HGB BLD-MCNC: 12.1 G/DL (ref 11.9–15.1)
LDLC SERPL CALC-MCNC: 109 MG/DL (ref 0–100)
MCH RBC QN AUTO: 29.2 PG (ref 25.2–33.5)
MCHC RBC AUTO-ENTMCNC: 31.6 G/DL (ref 28.4–34.8)
MCV RBC AUTO: 92.3 FL (ref 82.6–102.9)
MICROALBUMIN UR-MCNC: 40 MG/L (ref 0–20)
MICROALBUMIN/CREAT UR-RTO: 26 MCG/MG CREAT (ref 0–25)
NRBC BLD-RTO: 0 PER 100 WBC
PLATELET # BLD AUTO: 269 K/UL (ref 138–453)
PMV BLD AUTO: 11.7 FL (ref 8.1–13.5)
POTASSIUM SERPL-SCNC: 3.9 MMOL/L (ref 3.7–5.3)
PROT SERPL-MCNC: 7.5 G/DL (ref 6.6–8.7)
RBC # BLD AUTO: 4.15 M/UL (ref 3.95–5.11)
SODIUM SERPL-SCNC: 140 MMOL/L (ref 136–145)
TRIGL SERPL-MCNC: 100 MG/DL
TSH SERPL DL<=0.05 MIU/L-ACNC: 2.62 UIU/ML (ref 0.27–4.2)
VLDLC SERPL CALC-MCNC: 20 MG/DL
WBC OTHER # BLD: 8.4 K/UL (ref 3.5–11.3)

## 2024-06-22 PROCEDURE — 80076 HEPATIC FUNCTION PANEL: CPT

## 2024-06-22 PROCEDURE — 85027 COMPLETE CBC AUTOMATED: CPT

## 2024-06-22 PROCEDURE — 80061 LIPID PANEL: CPT

## 2024-06-22 PROCEDURE — 84443 ASSAY THYROID STIM HORMONE: CPT

## 2024-06-22 PROCEDURE — 80048 BASIC METABOLIC PNL TOTAL CA: CPT

## 2024-06-22 PROCEDURE — 85652 RBC SED RATE AUTOMATED: CPT

## 2024-06-22 PROCEDURE — 36415 COLL VENOUS BLD VENIPUNCTURE: CPT

## 2024-06-22 PROCEDURE — 82378 CARCINOEMBRYONIC ANTIGEN: CPT

## 2024-06-22 PROCEDURE — 82570 ASSAY OF URINE CREATININE: CPT

## 2024-06-22 PROCEDURE — 83036 HEMOGLOBIN GLYCOSYLATED A1C: CPT

## 2024-06-22 PROCEDURE — 82306 VITAMIN D 25 HYDROXY: CPT

## 2024-06-22 PROCEDURE — 82043 UR ALBUMIN QUANTITATIVE: CPT

## 2024-06-23 LAB
EST. AVERAGE GLUCOSE BLD GHB EST-MCNC: 137 MG/DL
HBA1C MFR BLD: 6.4 % (ref 4–6)

## 2024-07-01 ENCOUNTER — OFFICE VISIT (OUTPATIENT)
Dept: ORTHOPEDIC SURGERY | Age: 49
End: 2024-07-01
Payer: COMMERCIAL

## 2024-07-01 VITALS — HEIGHT: 60 IN | BODY MASS INDEX: 57.52 KG/M2 | WEIGHT: 293 LBS

## 2024-07-01 DIAGNOSIS — M25.562 CHRONIC PAIN OF LEFT KNEE: ICD-10-CM

## 2024-07-01 DIAGNOSIS — G89.29 CHRONIC PAIN OF LEFT KNEE: ICD-10-CM

## 2024-07-01 DIAGNOSIS — M17.12 ARTHRITIS OF LEFT KNEE: Primary | ICD-10-CM

## 2024-07-01 PROCEDURE — 99214 OFFICE O/P EST MOD 30 MIN: CPT | Performed by: PHYSICIAN ASSISTANT

## 2024-07-01 PROCEDURE — 20610 DRAIN/INJ JOINT/BURSA W/O US: CPT | Performed by: PHYSICIAN ASSISTANT

## 2024-07-01 PROCEDURE — G8427 DOCREV CUR MEDS BY ELIG CLIN: HCPCS | Performed by: PHYSICIAN ASSISTANT

## 2024-07-01 PROCEDURE — 4004F PT TOBACCO SCREEN RCVD TLK: CPT | Performed by: PHYSICIAN ASSISTANT

## 2024-07-01 PROCEDURE — G8417 CALC BMI ABV UP PARAM F/U: HCPCS | Performed by: PHYSICIAN ASSISTANT

## 2024-07-01 RX ORDER — CHOLECALCIFEROL (VITAMIN D3) 1250 MCG
CAPSULE ORAL
COMMUNITY

## 2024-07-01 RX ORDER — METHYLPREDNISOLONE ACETATE 80 MG/ML
80 INJECTION, SUSPENSION INTRA-ARTICULAR; INTRALESIONAL; INTRAMUSCULAR; SOFT TISSUE ONCE
Status: COMPLETED | OUTPATIENT
Start: 2024-07-01 | End: 2024-07-01

## 2024-07-01 RX ORDER — BUPIVACAINE HYDROCHLORIDE 2.5 MG/ML
2 INJECTION, SOLUTION INFILTRATION; PERINEURAL ONCE
Status: COMPLETED | OUTPATIENT
Start: 2024-07-01 | End: 2024-07-01

## 2024-07-01 RX ORDER — MELOXICAM 15 MG/1
15 TABLET ORAL DAILY
COMMUNITY

## 2024-07-01 RX ADMIN — BUPIVACAINE HYDROCHLORIDE 2 ML: 2.5 INJECTION, SOLUTION INFILTRATION; PERINEURAL at 09:48

## 2024-07-01 RX ADMIN — METHYLPREDNISOLONE ACETATE 80 MG: 80 INJECTION, SUSPENSION INTRA-ARTICULAR; INTRALESIONAL; INTRAMUSCULAR; SOFT TISSUE at 09:49

## 2024-07-01 ASSESSMENT — ENCOUNTER SYMPTOMS
SHORTNESS OF BREATH: 0
COUGH: 0
COLOR CHANGE: 0
VOMITING: 0

## 2024-07-01 NOTE — PROGRESS NOTES
[facility] PHYSICIANS [facility]TH[facility]  [address]  [place] [address]  Dept: [phone]  Dept Fax: [phone]        Ambulatory Follow Up      Subjective:   Lennie Johnson is a 48 y.o. year old female who presents to our office today for routine followup regarding her   1. Arthritis of left knee    2. Chronic pain of left knee        Chief Complaint   Patient presents with    Follow-up     LT knee pain.        HPI Lennie Johnson  is a 48 y.o. female who presents today in follow for chronic left knee pain due to osteoarthritis.  The patient was last seen on 4/27/2023 and underwent treatment in the form of Left knee corticosteroid injection and discussion about weight loss with a goal BMI at or below 40.0 to be eligible for a left total knee arthroplasty.   The patient notes that she had 3 days of increased knee pain after the injection then she notes moderate improvement in her pain with the injection.    The patient has been evaluated by the weight management/ bariatric surgery team, and she states that she had all of the requirements completed, but then she got nervous about undergoing surgery and she cancelled the surgery.    The patient has a current BMI of 57.81 (295 lbs) - which is down when compared to last year (60.54, 310lbs). She is a diabetic with a last documented HgbA1c of 6.4 as of 6/22/2024. The patient notes that she does smoke cigarettes - when she has the money to pay for them. Patient notes she is currently unemployed and does not have stable housing.    She notes she would like to start working so she can get a place of her own.      Review of Systems   Constitutional:  Negative for activity change and fever.   HENT:  Negative for sneezing.    Respiratory:  Negative for cough and shortness of breath.    Cardiovascular:  Negative for chest pain.   Gastrointestinal:  Negative for vomiting.   Musculoskeletal:  Positive for arthralgias

## 2024-08-20 ENCOUNTER — HOSPITAL ENCOUNTER (OUTPATIENT)
Age: 49
Setting detail: SPECIMEN
Discharge: HOME | End: 2024-08-20

## 2024-08-20 ENCOUNTER — OFFICE VISIT (OUTPATIENT)
Dept: DERMATOLOGY | Age: 49
End: 2024-08-20
Payer: COMMERCIAL

## 2024-08-20 VITALS
OXYGEN SATURATION: 98 % | DIASTOLIC BLOOD PRESSURE: 71 MMHG | TEMPERATURE: 97.2 F | BODY MASS INDEX: 56.36 KG/M2 | HEART RATE: 84 BPM | WEIGHT: 288.6 LBS | SYSTOLIC BLOOD PRESSURE: 106 MMHG

## 2024-08-20 DIAGNOSIS — L73.2 HIDRADENITIS SUPPURATIVA: Chronic | ICD-10-CM

## 2024-08-20 DIAGNOSIS — L30.0 NUMMULAR ECZEMA: ICD-10-CM

## 2024-08-20 DIAGNOSIS — L73.2 HIDRADENITIS SUPPURATIVA: Primary | Chronic | ICD-10-CM

## 2024-08-20 LAB
ALBUMIN SERPL-MCNC: 4.5 G/DL (ref 3.5–5.2)
ALBUMIN/GLOB SERPL: 1 {RATIO} (ref 1–2.5)
ALP SERPL-CCNC: 67 U/L (ref 35–104)
ALT SERPL-CCNC: 14 U/L (ref 10–35)
ANION GAP SERPL CALCULATED.3IONS-SCNC: 12 MMOL/L (ref 9–16)
AST SERPL-CCNC: 13 U/L (ref 10–35)
BASOPHILS # BLD: 0.04 K/UL (ref 0–0.2)
BASOPHILS NFR BLD: 1 % (ref 0–2)
BILIRUB SERPL-MCNC: 0.2 MG/DL (ref 0–1.2)
BUN SERPL-MCNC: 12 MG/DL (ref 6–20)
CALCIUM SERPL-MCNC: 9.4 MG/DL (ref 8.6–10.4)
CHLORIDE SERPL-SCNC: 102 MMOL/L (ref 98–107)
CO2 SERPL-SCNC: 25 MMOL/L (ref 20–31)
CREAT SERPL-MCNC: 0.6 MG/DL (ref 0.5–0.9)
EOSINOPHIL # BLD: 0.34 K/UL (ref 0–0.44)
EOSINOPHILS RELATIVE PERCENT: 4 % (ref 1–4)
ERYTHROCYTE [DISTWIDTH] IN BLOOD BY AUTOMATED COUNT: 15.1 % (ref 11.8–14.4)
GFR, ESTIMATED: >90 ML/MIN/1.73M2
GLUCOSE SERPL-MCNC: 98 MG/DL (ref 74–99)
HAV IGM SERPL QL IA: NONREACTIVE
HBV CORE IGM SERPL QL IA: NONREACTIVE
HBV SURFACE AG SERPL QL IA: NONREACTIVE
HCT VFR BLD AUTO: 38.9 % (ref 36.3–47.1)
HCV AB SERPL QL IA: NONREACTIVE
HGB BLD-MCNC: 12.3 G/DL (ref 11.9–15.1)
HIV 1+2 AB+HIV1 P24 AG SERPL QL IA: NONREACTIVE
IMM GRANULOCYTES # BLD AUTO: <0.03 K/UL (ref 0–0.3)
IMM GRANULOCYTES NFR BLD: 0 %
LYMPHOCYTES NFR BLD: 2.9 K/UL (ref 1.1–3.7)
LYMPHOCYTES RELATIVE PERCENT: 33 % (ref 24–43)
MCH RBC QN AUTO: 29.5 PG (ref 25.2–33.5)
MCHC RBC AUTO-ENTMCNC: 31.6 G/DL (ref 28.4–34.8)
MCV RBC AUTO: 93.3 FL (ref 82.6–102.9)
MONOCYTES NFR BLD: 0.54 K/UL (ref 0.1–1.2)
MONOCYTES NFR BLD: 6 % (ref 3–12)
NEUTROPHILS NFR BLD: 56 % (ref 36–65)
NEUTS SEG NFR BLD: 4.89 K/UL (ref 1.5–8.1)
NRBC BLD-RTO: 0 PER 100 WBC
PLATELET # BLD AUTO: 263 K/UL (ref 138–453)
PMV BLD AUTO: 12.5 FL (ref 8.1–13.5)
POTASSIUM SERPL-SCNC: 3.6 MMOL/L (ref 3.7–5.3)
PROT SERPL-MCNC: 7.9 G/DL (ref 6.6–8.7)
RBC # BLD AUTO: 4.17 M/UL (ref 3.95–5.11)
RBC # BLD: ABNORMAL 10*6/UL
SODIUM SERPL-SCNC: 139 MMOL/L (ref 136–145)
WBC OTHER # BLD: 8.7 K/UL (ref 3.5–11.3)

## 2024-08-20 PROCEDURE — 99204 OFFICE O/P NEW MOD 45 MIN: CPT | Performed by: DERMATOLOGY

## 2024-08-20 PROCEDURE — G8427 DOCREV CUR MEDS BY ELIG CLIN: HCPCS | Performed by: DERMATOLOGY

## 2024-08-20 PROCEDURE — 3078F DIAST BP <80 MM HG: CPT | Performed by: DERMATOLOGY

## 2024-08-20 PROCEDURE — G8417 CALC BMI ABV UP PARAM F/U: HCPCS | Performed by: DERMATOLOGY

## 2024-08-20 PROCEDURE — 4004F PT TOBACCO SCREEN RCVD TLK: CPT | Performed by: DERMATOLOGY

## 2024-08-20 PROCEDURE — 3074F SYST BP LT 130 MM HG: CPT | Performed by: DERMATOLOGY

## 2024-08-20 RX ORDER — CLOBETASOL PROPIONATE 0.5 MG/G
OINTMENT TOPICAL
Qty: 60 G | Refills: 2 | Status: SHIPPED | OUTPATIENT
Start: 2024-08-20

## 2024-08-20 RX ORDER — CLINDAMYCIN PHOSPHATE 10 UG/ML
LOTION TOPICAL
Qty: 60 ML | Refills: 3 | Status: SHIPPED | OUTPATIENT
Start: 2024-08-20

## 2024-08-20 NOTE — PROGRESS NOTES
Dermatology Patient Note  [facility] PHYSICIANS [facility], [facility] DERMATOLOGY  [address]  [address]  [place] [address]  Dept: [phone]  Dept Fax: [phone]      VISITDATE: 8/20/2024   REFERRING PROVIDER: No ref. provider found      Lennie Johnson is a 48 y.o. female  who presents today in the office for:    New Patient (Patient thinks she has HS.  She has used a BPO wash.  Patient said she was Treated with an oral abx  for 3 yrs. She has a flare up on her right hip/thigh at this time it is painful. She has a patch of itchy dry skin on left leg that her pcp TX with )      HISTORY OF PRESENT ILLNESS:  New patient presents to [facility] care. She was last seen on 12/18/19. At that visit, she was prescribed BPO wash, clindamycin lotion, and doxycycline 100 mg BID for 3 months.     Today, she notes that she has an HS flare on the right hip/thigh that is painful. She is currently using BPO wash. She has also performed bleach baths intermittently. She has sought treatment in the ED for flares in the past. She has previously been on an antibiotic therapy as well - the patient notes that she used antibiotics for 3 years. She feels that this improved symptoms while she was using it but then boils recurred. She admits to menstrual flares. She develops boils on her groin, abdomen, axillae, and buttocks. She denies known family history of HS. She is in an HS support group on Facebook.     She also has a patch of dry, itchy skin on the left leg. She notes that this patch has been present for 2 years.     She takes medication to control her blood pressure (amlodipine and lisonopril). She used Trulicity in the past and struggled with giving herself injections. She has not been taking this medication recently as this is on backorder.     MEDICAL PROBLEMS:  Patient Active Problem List    Diagnosis Date Noted    Localized osteoarthritis of left knee 03/31/2022    Retained

## 2024-08-20 NOTE — PATIENT INSTRUCTIONS
For HS:  - start benzoyl peroxide 5% wash to all affected areas daily (OTC examples in AVS)  - start clindamycin 1% lotion to all affected area daily  - complete labs as ordered; prior authorization initiated for Humira   - counseled patient to contact the office for severe flares to undergo ILK injection  - recommended that she seek treatment with PCP regarding an alternative for Trulicity as she has not been able to obtain this recently     Recommendations for over-the-counter Benzoyl Peroxide cleansers:  -- Panoxyl Wash (various strengths of benzoyl peroxide)  -- Acne Free brand oil-free acne cleanser with 2.5% benzoyl peroxide and Acne Free brand Acne Pore cleanser maximum strength (benzoyl peroxide 5%)  -- Neutrogena Clear Pore Cleanser/Mask with 3.5% benzoyl peroxide  -- Clean and Clear advantage 3 in 1 exfoliating cleanser that contains benzoyl peroxide 5%  -- Clean and Clear Continuous Control Acne Cleanser 10% benzoyl peroxide  -- Oxy maximum face wash 10% benzoyl peroxide    Benzoyl peroxide may dry out your skin and cause some irritation, especially when you first start using it. Moisturize with a non-comedogenic (non pore clogging) lotion after washing. Choose lower percentage benzoyl peroxide washes if you have sensitive skin. If it's still too irritating, try using every other day.    For eczema:  - start clobetasol ointment twice daily on areas that are flaring: apply a thick layer to the thicker plaques of eczema and a thin layer over the whole lower leg until resolved      ADALIMUMAB (Humira)     This patient handout has been adapted for use by Laine Alvarez MD of [facility] from the [employer] website.     What are the aims of this handout? This handout has been written to help you understand more about adalimumab (Humira). It tells you what it is, how it works, how it is used to treat skin conditions, and where you can find out more about it.     What is

## 2024-08-22 LAB
QUANTI TB GOLD PLUS: NEGATIVE
QUANTI TB1 MINUS NIL: 0 IU/ML
QUANTI TB2 MINUS NIL: 0.01 IU/ML
QUANTIFERON MITOGEN: 9.94 IU/ML
QUANTIFERON NIL: 0.06 IU/ML

## 2024-08-22 RX ORDER — ADALIMUMAB 80MG/0.8ML
KIT SUBCUTANEOUS
Qty: 3 EACH | Refills: 0 | Status: SHIPPED | OUTPATIENT
Start: 2024-08-22

## 2024-08-22 RX ORDER — ADALIMUMAB 80MG/0.8ML
KIT SUBCUTANEOUS
Qty: 2 EACH | Refills: 2 | Status: SHIPPED | OUTPATIENT
Start: 2024-08-22

## 2024-11-21 ENCOUNTER — OFFICE VISIT (OUTPATIENT)
Dept: DERMATOLOGY | Age: 49
End: 2024-11-21
Payer: COMMERCIAL

## 2024-11-21 VITALS
OXYGEN SATURATION: 94 % | BODY MASS INDEX: 57.52 KG/M2 | DIASTOLIC BLOOD PRESSURE: 69 MMHG | TEMPERATURE: 97.5 F | SYSTOLIC BLOOD PRESSURE: 133 MMHG | HEART RATE: 65 BPM | WEIGHT: 293 LBS | HEIGHT: 60 IN

## 2024-11-21 DIAGNOSIS — Z79.899 HIGH RISK MEDICATION USE: ICD-10-CM

## 2024-11-21 DIAGNOSIS — L73.2 HIDRADENITIS SUPPURATIVA: Primary | ICD-10-CM

## 2024-11-21 DIAGNOSIS — L72.9 CYST OF SKIN: ICD-10-CM

## 2024-11-21 DIAGNOSIS — L30.0 NUMMULAR ECZEMA: ICD-10-CM

## 2024-11-21 PROCEDURE — G8484 FLU IMMUNIZE NO ADMIN: HCPCS | Performed by: DERMATOLOGY

## 2024-11-21 PROCEDURE — G8427 DOCREV CUR MEDS BY ELIG CLIN: HCPCS | Performed by: DERMATOLOGY

## 2024-11-21 PROCEDURE — 4004F PT TOBACCO SCREEN RCVD TLK: CPT | Performed by: DERMATOLOGY

## 2024-11-21 PROCEDURE — G8417 CALC BMI ABV UP PARAM F/U: HCPCS | Performed by: DERMATOLOGY

## 2024-11-21 PROCEDURE — 99214 OFFICE O/P EST MOD 30 MIN: CPT | Performed by: DERMATOLOGY

## 2024-11-21 PROCEDURE — 3075F SYST BP GE 130 - 139MM HG: CPT | Performed by: DERMATOLOGY

## 2024-11-21 PROCEDURE — 3078F DIAST BP <80 MM HG: CPT | Performed by: DERMATOLOGY

## 2024-11-21 NOTE — PATIENT INSTRUCTIONS
For Hidradenitis suppurativa  - Discussed that tunnels on abdomen will not completely heal. Offered ILK and scrapping to prevent keratin build up but this will cause scarring. Schedule for procedure visit  - continue benzoyl peroxide 5% wash to affected areas daily  - continue clindamycin 1% lotion to affected area daily  - Aklief samples provided today, patient to call if she would like prescription sent  - continue Humira    Discussed biologic medications for psoriasis. Counseled patient that biologics suppress the immune system and increase risk of infection, which may be serious or even fatal. Biologics may also increase risk for certain cancers. TNF-alpha inhibitors (Enbrel, Humira, Cimzia, Remicade) also carry a risk of lupus-like reaction, demyelinating disease, and exacerbation of congestive heart failure. IL-17 inhibitors (Cosentyx and Taltz) may worsen inflammatory bowel disease.  - counseled patient to contact the office for severe flares to undergo ILK injection     Nummular eczema of arms and legs  - chronic illness with progression and/or exacerbation  - rash on legs have resolved  - continue clobetasol ointment BID on areas that are flaring    Cyst of skin of right temple  - referral to plastic surgery

## 2024-11-21 NOTE — PROGRESS NOTES
Dermatology Patient Note  [facility], [facility] DERMATOLOGY  [facility] [facility]  [address]  [facility] [address]  Dept: [phone]  Dept Fax: [phone]      VISITDATE: 11/21/2024   REFERRING PROVIDER: No ref. provider found      Lennie Johnson is a 49 y.o. female  who presents today in the office for:    follow up (Pt presents today for her 3 month HS follow up. She is currently using Humira and is having fewer outbreaks. She states hat she has a rash since using the Humira on her arms and on her legs that itches and is red. She has a spot above her right ear she would like to have looked at. )      HISTORY OF PRESENT ILLNESS:  Patient presents for 3 month HS follow up. At  on 8/20/24, she was to start BPO wash and clindamycin lotion daily. A PA was initiated for Humira and labs were ordered for this. Spironolactone was in reserve. She was encouraged to seek treatment with PCP for an alternative for Trulicity as she had not been able to obtain that. For nummular eczema, she was started on clobetasol ointment BID for flares.     Today, patient states she currently has flares on her right axilla due to her period. Notes regular flares around her menses. She has a rash on her arms, no topical use. Inquiring whether cockroaches can cause it, not bed bugs. Rash on legs have cleared up. She is bothered by the appearance of build up on previous HS nodules of abdomen. Notes she does not like the smell either. No SE on Humira.     MEDICAL PROBLEMS:  Patient Active Problem List    Diagnosis Date Noted    Localized osteoarthritis of left knee 03/31/2022    Retained intrauterine contraceptive device (IUD)     Hysteroscopy, D&C, Myosure, Mirena IUD Insertion 11/18/19 11/18/2019     Mirena IUD  Lot: [id number]  Expiration: February 2022  Insertion Date: 11/18/19; Removal Date 11/18/2024      Abnormal uterine bleeding (AUB) 08/09/2019     US ordered and WNL with

## 2024-11-26 ENCOUNTER — TELEPHONE (OUTPATIENT)
Dept: DERMATOLOGY | Age: 49
End: 2024-11-26

## 2024-11-26 ENCOUNTER — TELEPHONE (OUTPATIENT)
Dept: SURGERY | Age: 49
End: 2024-11-26

## 2024-11-26 DIAGNOSIS — L73.2 HIDRADENITIS SUPPURATIVA: Primary | ICD-10-CM

## 2024-11-26 RX ORDER — ADALIMUMAB 80MG/0.8ML
KIT SUBCUTANEOUS
Qty: 1.6 ML | Refills: 5 | Status: ACTIVE | OUTPATIENT
Start: 2024-11-26

## 2024-12-19 ENCOUNTER — HOSPITAL ENCOUNTER (EMERGENCY)
Age: 49
Discharge: HOME | End: 2024-12-19
Attending: STUDENT IN AN ORGANIZED HEALTH CARE EDUCATION/TRAINING PROGRAM
Payer: COMMERCIAL

## 2024-12-19 VITALS
DIASTOLIC BLOOD PRESSURE: 100 MMHG | OXYGEN SATURATION: 99 % | TEMPERATURE: 99 F | RESPIRATION RATE: 19 BRPM | BODY MASS INDEX: 56.93 KG/M2 | HEART RATE: 87 BPM | HEIGHT: 60 IN | SYSTOLIC BLOOD PRESSURE: 147 MMHG | WEIGHT: 290 LBS

## 2024-12-19 DIAGNOSIS — K04.7 DENTAL ABSCESS: Primary | ICD-10-CM

## 2024-12-19 PROCEDURE — 96372 THER/PROPH/DIAG INJ SC/IM: CPT | Performed by: STUDENT IN AN ORGANIZED HEALTH CARE EDUCATION/TRAINING PROGRAM

## 2024-12-19 PROCEDURE — 99284 EMERGENCY DEPT VISIT MOD MDM: CPT | Performed by: STUDENT IN AN ORGANIZED HEALTH CARE EDUCATION/TRAINING PROGRAM

## 2024-12-19 PROCEDURE — 6370000000 HC RX 637 (ALT 250 FOR IP)

## 2024-12-19 PROCEDURE — 6360000002 HC RX W HCPCS

## 2024-12-19 RX ORDER — IBUPROFEN 600 MG/1
600 TABLET, FILM COATED ORAL 3 TIMES DAILY PRN
Qty: 30 TABLET | Refills: 0 | Status: SHIPPED | OUTPATIENT
Start: 2024-12-19

## 2024-12-19 RX ORDER — KETOROLAC TROMETHAMINE 15 MG/ML
15 INJECTION, SOLUTION INTRAMUSCULAR; INTRAVENOUS ONCE
Status: COMPLETED | OUTPATIENT
Start: 2024-12-19 | End: 2024-12-19

## 2024-12-19 RX ORDER — KETOROLAC TROMETHAMINE 15 MG/ML
15 INJECTION, SOLUTION INTRAMUSCULAR; INTRAVENOUS ONCE
Status: DISCONTINUED | OUTPATIENT
Start: 2024-12-19 | End: 2024-12-19

## 2024-12-19 RX ADMIN — AMOXICILLIN AND CLAVULANATE POTASSIUM 1 TABLET: 875; 125 TABLET, FILM COATED ORAL at 13:27

## 2024-12-19 RX ADMIN — KETOROLAC TROMETHAMINE 15 MG: 15 INJECTION, SOLUTION INTRAMUSCULAR; INTRAVENOUS at 13:33

## 2024-12-19 ASSESSMENT — LIFESTYLE VARIABLES
HOW OFTEN DO YOU HAVE A DRINK CONTAINING ALCOHOL: NEVER
HOW MANY STANDARD DRINKS CONTAINING ALCOHOL DO YOU HAVE ON A TYPICAL DAY: PATIENT DOES NOT DRINK

## 2024-12-19 ASSESSMENT — PAIN - FUNCTIONAL ASSESSMENT: PAIN_FUNCTIONAL_ASSESSMENT: ACTIVITIES ARE NOT PREVENTED

## 2024-12-19 ASSESSMENT — ENCOUNTER SYMPTOMS
RESPIRATORY NEGATIVE: 1
GASTROINTESTINAL NEGATIVE: 1

## 2024-12-19 ASSESSMENT — PAIN DESCRIPTION - DESCRIPTORS: DESCRIPTORS: ACHING

## 2024-12-19 ASSESSMENT — PAIN DESCRIPTION - LOCATION: LOCATION: MOUTH

## 2024-12-19 ASSESSMENT — PAIN SCALES - GENERAL
PAINLEVEL_OUTOF10: 10
PAINLEVEL_OUTOF10: 10

## 2024-12-19 NOTE — ED PROVIDER NOTES
[facility] EMERGENCY DEPARTMENT  Emergency Department Encounter  Emergency Medicine Resident     Pt Name:Lennie Johnson  MRN: [id number]  Birthdate 1975  Date of evaluation: 12/19/24  PCP:  John Mcfadden MD  Note Started: 1:21 PM EST      CHIEF COMPLAINT       Chief Complaint   Patient presents with    Dental Problem       HISTORY OF PRESENT ILLNESS  (Location/Symptom, Timing/Onset, Context/Setting, Quality, Duration, Modifying Factors, Severity.)      Lennie Johnson is a 49 y.o. female who presents with dental pain. Patient states that she has had edema an tenderness of the lower medial gum line for four days. She was brushing her teeth this morning when the abscess began to drain white foul smelling fluid. Denies fevers, chills, N/V. She states that pain radiates to the right ear. No tinnitus or hearing loss. No difficulties with hearing or swallowing. Has a dentist appointment scheduled in March. Patient received IM Toradol and one dose Augmentin in ED. Discharged from ED in stable condition and instructed to take antibiotics as scheduled.     PAST MEDICAL / SURGICAL / SOCIAL / FAMILY HISTORY      has a past medical history of Abnormal uterine bleeding (AUB), Anemia, Arthritis, Asthma, Diabetes mellitus (HCC), GERD (gastroesophageal reflux disease), Hidradenitis suppurativa, History of blood transfusion, Hyperlipidemia, Hypertension, Leg swelling, Obesity, morbid, BMI 50 or higher, Sleep apnea, Wears dentures, and Wellness examination.       has a past surgical history that includes Tubal ligation (1997); Abscess Drainage (12/23/2016); pr unlisted procedure rectum (N/A, 07/02/2018); Dilation and curettage of uterus (N/A, 11/18/2019); Dilation and curettage of uterus (N/A, 7/26/2021); and Dilation and curettage of uterus (N/A, 7/26/2021).      Social History     Socioeconomic History    Marital status: Single     Spouse name: Not on file    Number of children: 4    Years of education:  Expiration Date (Optional): 04/2024

## 2024-12-19 NOTE — ED PROVIDER NOTES
[facility]     Emergency Department     Faculty Attestation    I performed a history and physical examination of the patient and discussed management with the resident. I have reviewed and agree with the resident’s findings including all diagnostic interpretations, and treatment plans as written at the time of my review. Any areas of disagreement are noted on the chart. I was personally present for the key portions of any procedures. I have documented in the chart those procedures where I was not present during the key portions. For Physician Assistant/ Nurse Practitioner cases/documentation I have personally evaluated this patient and have completed at least one if not all key elements of the E/M (history, physical exam, and MDM). Additional findings are as noted.    PtName: Lennie Johnson  MRN: [id number]  Birthdate 1975  Date of evaluation: 12/19/24  Note Started: 1:04 PM EST    Primary Care Physician: John Mcfadden MD    Brief HPI:  49-year-old female presents emergency department with dental pain, swelling, discharge.  The patient reports symptoms have been ongoing over the past few days.  This morning she noticed foul-smelling white drainage.  Denies fevers, chills, or neck pain.    Pertinent Physical Exam Findings:  Vitals:    12/19/24 1302   BP: (!) 147/100   Pulse: 87   Resp: 19   Temp: 99 °F (37.2 °C)   SpO2: 99%   Apical abscess visualized at the lower midline teeth, actively draining.  No sublingual or submandibular tenderness.  Appears well, no acute distress.    Medical Decision Making: Patient is a 49 y.o. female presenting to the emergency department with dental infection. The chart was reviewed for pertinent history relating to the chief complaint.  No concern for Junaid's angina or deep space neck infection at this time, plan to treat with oral antibiotics and Toradol as needed.    All results, including labs (if ordered), imaging (if

## 2024-12-19 NOTE — ED NOTES
Pt is A+Ox4  Pt complains of mouth pain x 4 days  Pt has 4 remaining teeth to the bottom of mouth  Pt states she has a dental appointment in March  All questions answered and needs met at this time  Physician at the bedside  All questions answered and needs met at this time  Whiteboard updated

## 2025-02-13 ENCOUNTER — HOSPITAL ENCOUNTER (EMERGENCY)
Age: 50
Discharge: HOME | End: 2025-02-13
Attending: EMERGENCY MEDICINE
Payer: COMMERCIAL

## 2025-02-13 VITALS
OXYGEN SATURATION: 97 % | WEIGHT: 293 LBS | RESPIRATION RATE: 20 BRPM | TEMPERATURE: 98.6 F | SYSTOLIC BLOOD PRESSURE: 113 MMHG | HEART RATE: 87 BPM | BODY MASS INDEX: 57.52 KG/M2 | HEIGHT: 60 IN | DIASTOLIC BLOOD PRESSURE: 77 MMHG

## 2025-02-13 DIAGNOSIS — J10.1 INFLUENZA A: Primary | ICD-10-CM

## 2025-02-13 LAB
FLUAV RNA RESP QL NAA+PROBE: DETECTED
FLUBV RNA RESP QL NAA+PROBE: NOT DETECTED
SARS-COV-2 RNA RESP QL NAA+PROBE: NOT DETECTED
SOURCE: ABNORMAL
SPECIMEN DESCRIPTION: ABNORMAL

## 2025-02-13 PROCEDURE — 6370000000 HC RX 637 (ALT 250 FOR IP): Performed by: EMERGENCY MEDICINE

## 2025-02-13 PROCEDURE — 87636 SARSCOV2 & INF A&B AMP PRB: CPT

## 2025-02-13 PROCEDURE — 99283 EMERGENCY DEPT VISIT LOW MDM: CPT

## 2025-02-13 RX ORDER — OSELTAMIVIR PHOSPHATE 75 MG/1
75 CAPSULE ORAL 2 TIMES DAILY
Qty: 10 CAPSULE | Refills: 0 | Status: SHIPPED | OUTPATIENT
Start: 2025-02-13 | End: 2025-02-18

## 2025-02-13 RX ORDER — IBUPROFEN 800 MG/1
800 TABLET, FILM COATED ORAL ONCE
Status: COMPLETED | OUTPATIENT
Start: 2025-02-13 | End: 2025-02-13

## 2025-02-13 RX ADMIN — IBUPROFEN 800 MG: 800 TABLET, FILM COATED ORAL at 14:49

## 2025-02-13 ASSESSMENT — ENCOUNTER SYMPTOMS
VOMITING: 0
SHORTNESS OF BREATH: 0
COUGH: 1
ABDOMINAL PAIN: 0
RHINORRHEA: 1
NAUSEA: 0

## 2025-02-13 ASSESSMENT — PAIN - FUNCTIONAL ASSESSMENT: PAIN_FUNCTIONAL_ASSESSMENT: 0-10

## 2025-02-13 ASSESSMENT — PAIN SCALES - GENERAL: PAINLEVEL_OUTOF10: 9

## 2025-02-13 ASSESSMENT — LIFESTYLE VARIABLES
HOW OFTEN DO YOU HAVE A DRINK CONTAINING ALCOHOL: MONTHLY OR LESS
HOW MANY STANDARD DRINKS CONTAINING ALCOHOL DO YOU HAVE ON A TYPICAL DAY: 1 OR 2

## 2025-02-13 NOTE — ED PROVIDER NOTES
ENCOUNTER:  Orders Placed This Encounter   Medications    ibuprofen (ADVIL;MOTRIN) tablet 800 mg    oseltamivir (TAMIFLU) 75 MG capsule     Sig: Take 1 capsule by mouth 2 times daily for 5 days     Dispense:  10 capsule     Refill:  0     DISCHARGE PRESCRIPTIONS:  New Prescriptions    OSELTAMIVIR (TAMIFLU) 75 MG CAPSULE    Take 1 capsule by mouth 2 times daily for 5 days     PHYSICIAN CONSULTS ORDERED THIS ENCOUNTER:  None  FINAL IMPRESSION      1. Influenza A          DISPOSITION/PLAN   DISPOSITION Decision To Discharge 02/13/2025 03:27:57 PM   DISPOSITION CONDITION Stable           OUTPATIENT FOLLOW UP THE PATIENT:  No follow-up provider specified.    DO Cira Jamison, Cira DEL ROSARIO DO  02/13/25 1530

## 2025-02-21 ENCOUNTER — OFFICE VISIT (OUTPATIENT)
Dept: SURGERY | Age: 50
End: 2025-02-21
Payer: COMMERCIAL

## 2025-02-21 VITALS
DIASTOLIC BLOOD PRESSURE: 89 MMHG | BODY MASS INDEX: 57.52 KG/M2 | HEART RATE: 88 BPM | WEIGHT: 293 LBS | SYSTOLIC BLOOD PRESSURE: 140 MMHG | RESPIRATION RATE: 18 BRPM | HEIGHT: 60 IN

## 2025-02-21 DIAGNOSIS — D48.5 NEOPLASM OF UNCERTAIN BEHAVIOR OF SKIN: Primary | ICD-10-CM

## 2025-02-21 PROCEDURE — 3079F DIAST BP 80-89 MM HG: CPT | Performed by: PLASTIC SURGERY

## 2025-02-21 PROCEDURE — 4004F PT TOBACCO SCREEN RCVD TLK: CPT | Performed by: PLASTIC SURGERY

## 2025-02-21 PROCEDURE — 3077F SYST BP >= 140 MM HG: CPT | Performed by: PLASTIC SURGERY

## 2025-02-21 PROCEDURE — G8427 DOCREV CUR MEDS BY ELIG CLIN: HCPCS | Performed by: PLASTIC SURGERY

## 2025-02-21 PROCEDURE — G8417 CALC BMI ABV UP PARAM F/U: HCPCS | Performed by: PLASTIC SURGERY

## 2025-02-21 PROCEDURE — 99203 OFFICE O/P NEW LOW 30 MIN: CPT | Performed by: PLASTIC SURGERY

## 2025-02-21 NOTE — PROGRESS NOTES
[facility] PHYSICIANS [facility] SURGICAL SPECIALISTS  [address] [place] [address]       History and Physical     No chief complaint on file.       HPI:   Lennie Johnson is a 49 y.o. female who presents with a mass on the right lateral thigh.  The mass has been present for several years it continues to grow.  Patient is here for evaluation treatment.  It does cause constant irritation and causes patient pain and discomfort..    Medications:     Current Outpatient Medications   Medication Sig Dispense Refill    ibuprofen (ADVIL;MOTRIN) 600 MG tablet Take 1 tablet by mouth 3 times daily as needed for Pain 30 tablet 0    adalimumab (HUMIRA, 2 PEN,) 80 MG/0.8ML AJKT injection pen kit Inject 80 mg (1 pen) SC every 14 days 1.6 mL 5    adalimumab (HUMIRA-CD/UC/HS STARTER) 80 MG/0.8ML PNKT Inject 160 mg SC x 1 on day 1, then 80 mg SC x 1 on day 15 3 each 0    adalimumab (HUMIRA, 2 PEN,) 80 MG/0.8ML PNKT Inject 80 mg SC q2wk starting on day 29 2 each 2    benzoyl peroxide 5 % external liquid Wash affected areas once daily 227 g 3    clindamycin (CLEOCIN T) 1 % lotion Apply to affected areas daily 60 mL 3    clobetasol (TEMOVATE) 0.05 % ointment Apply to rash on legs twice daily (not face, armpit or groin) 60 g 2    Cholecalciferol (VITAMIN D3) 1.25 MG (34127 UT) CAPS Take by mouth      meloxicam (MOBIC) 15 MG tablet Take 1 tablet by mouth daily      lisinopril (PRINIVIL;ZESTRIL) 10 MG tablet Take 1 tablet by mouth daily      mupirocin (BACTROBAN) 2 % ointment Apply topically 2 times daily      potassium chloride (KLOR-CON M) 20 MEQ extended release tablet Take 1 tablet by mouth daily (Patient not taking: Reported on 12/13/2023)      dicyclomine (BENTYL) 20 MG tablet Take 1 tablet by mouth every 6 hours as needed (abdominal cramping) 20 tablet 0    metFORMIN (GLUCOPHAGE) 500 MG tablet take 1 tablet by mouth once daily with breakfast 60 tablet 0    tiZANidine

## 2025-03-03 ENCOUNTER — PREP FOR PROCEDURE (OUTPATIENT)
Dept: SURGERY | Age: 50
End: 2025-03-03

## 2025-03-03 DIAGNOSIS — D48.5 NEOPLASM OF UNCERTAIN BEHAVIOR OF SKIN: ICD-10-CM

## 2025-03-05 ENCOUNTER — OFFICE VISIT (OUTPATIENT)
Dept: ORTHOPEDIC SURGERY | Age: 50
End: 2025-03-05

## 2025-03-05 VITALS — WEIGHT: 288 LBS | BODY MASS INDEX: 56.54 KG/M2 | HEIGHT: 60 IN

## 2025-03-05 DIAGNOSIS — M17.12 ARTHRITIS OF LEFT KNEE: Primary | ICD-10-CM

## 2025-03-05 RX ORDER — BUPIVACAINE HYDROCHLORIDE 2.5 MG/ML
2 INJECTION, SOLUTION INFILTRATION; PERINEURAL ONCE
Status: COMPLETED | OUTPATIENT
Start: 2025-03-05 | End: 2025-03-05

## 2025-03-05 RX ORDER — METHYLPREDNISOLONE ACETATE 80 MG/ML
80 INJECTION, SUSPENSION INTRA-ARTICULAR; INTRALESIONAL; INTRAMUSCULAR; SOFT TISSUE ONCE
Status: COMPLETED | OUTPATIENT
Start: 2025-03-05 | End: 2025-03-05

## 2025-03-05 RX ADMIN — BUPIVACAINE HYDROCHLORIDE 5 MG: 2.5 INJECTION, SOLUTION INFILTRATION; PERINEURAL at 11:24

## 2025-03-05 RX ADMIN — METHYLPREDNISOLONE ACETATE 80 MG: 80 INJECTION, SUSPENSION INTRA-ARTICULAR; INTRALESIONAL; INTRAMUSCULAR; SOFT TISSUE at 11:24

## 2025-03-05 NOTE — PROGRESS NOTES
to relieve any injection site related pain. Patient was advised to contact nurse if area becomes swollen, hot, erythematous, or painful, or to go to the emergency room after business hours.    Dr. Dave was present throughout the entire procedure.       Follow up:No follow-ups on file.    Orders Placed This Encounter   Medications    methylPREDNISolone acetate (DEPO-MEDROL) injection 80 mg    BUPivacaine (MARCAINE) 0.25 % injection 5 mg          Orders Placed This Encounter   Procedures    [id number] - DRAIN/INJECT LARGE JOINT/BURSA       Dexter Greene DO  Orthopedic Surgery Resident, PGY-2  [place], [place]

## 2025-04-09 ENCOUNTER — TELEPHONE (OUTPATIENT)
Age: 50
End: 2025-04-09

## 2025-04-09 NOTE — TELEPHONE ENCOUNTER
Dr Hernandez office called stated that this patient is now having her surgery on the 17th of April not the 25th. We sent a clearance stating she needs to stop her humira 14 days prior and its now 7 days away. How would you like to proceed with this?     Clearance is scanned in but Im having them fax a new one with the update surgery date.

## 2025-04-10 ENCOUNTER — HOSPITAL ENCOUNTER (OUTPATIENT)
Dept: PREADMISSION TESTING | Age: 50
Discharge: HOME | End: 2025-04-14
Payer: COMMERCIAL

## 2025-04-10 VITALS
RESPIRATION RATE: 18 BRPM | OXYGEN SATURATION: 95 % | BODY MASS INDEX: 56.93 KG/M2 | TEMPERATURE: 97.2 F | HEIGHT: 60 IN | HEART RATE: 95 BPM | DIASTOLIC BLOOD PRESSURE: 84 MMHG | WEIGHT: 290 LBS | SYSTOLIC BLOOD PRESSURE: 127 MMHG

## 2025-04-10 DIAGNOSIS — Z01.818 PRE-OP TESTING: Primary | ICD-10-CM

## 2025-04-10 LAB
ANION GAP SERPL CALCULATED.3IONS-SCNC: 9 MMOL/L (ref 9–16)
BUN SERPL-MCNC: 12 MG/DL (ref 6–20)
CALCIUM SERPL-MCNC: 9.4 MG/DL (ref 8.6–10.4)
CHLORIDE SERPL-SCNC: 104 MMOL/L (ref 98–107)
CO2 SERPL-SCNC: 29 MMOL/L (ref 20–31)
CREAT SERPL-MCNC: 0.5 MG/DL (ref 0.6–0.9)
ERYTHROCYTE [DISTWIDTH] IN BLOOD BY AUTOMATED COUNT: 15.4 % (ref 11.8–14.4)
EST. AVERAGE GLUCOSE BLD GHB EST-MCNC: 134 MG/DL
GFR, ESTIMATED: >90 ML/MIN/1.73M2
GLUCOSE SERPL-MCNC: 112 MG/DL (ref 74–99)
HBA1C MFR BLD: 6.3 % (ref 4–6)
HCT VFR BLD AUTO: 39.6 % (ref 36.3–47.1)
HGB BLD-MCNC: 12.1 G/DL (ref 11.9–15.1)
MCH RBC QN AUTO: 28.9 PG (ref 25.2–33.5)
MCHC RBC AUTO-ENTMCNC: 30.6 G/DL (ref 28.4–34.8)
MCV RBC AUTO: 94.5 FL (ref 82.6–102.9)
NRBC BLD-RTO: 0 PER 100 WBC
PLATELET # BLD AUTO: 242 K/UL (ref 138–453)
PMV BLD AUTO: 12.1 FL (ref 8.1–13.5)
POTASSIUM SERPL-SCNC: 4.4 MMOL/L (ref 3.7–5.3)
RBC # BLD AUTO: 4.19 M/UL (ref 3.95–5.11)
SODIUM SERPL-SCNC: 142 MMOL/L (ref 136–145)
WBC OTHER # BLD: 7.9 K/UL (ref 3.5–11.3)

## 2025-04-10 PROCEDURE — 80048 BASIC METABOLIC PNL TOTAL CA: CPT

## 2025-04-10 PROCEDURE — 36415 COLL VENOUS BLD VENIPUNCTURE: CPT

## 2025-04-10 PROCEDURE — 85027 COMPLETE CBC AUTOMATED: CPT

## 2025-04-10 PROCEDURE — 93005 ELECTROCARDIOGRAM TRACING: CPT | Performed by: ANESTHESIOLOGY

## 2025-04-10 PROCEDURE — 83036 HEMOGLOBIN GLYCOSYLATED A1C: CPT

## 2025-04-10 RX ORDER — BUDESONIDE AND FORMOTEROL FUMARATE DIHYDRATE 160; 4.5 UG/1; UG/1
2 AEROSOL RESPIRATORY (INHALATION) 2 TIMES DAILY
COMMUNITY
Start: 2025-03-04

## 2025-04-10 RX ORDER — ALBUTEROL SULFATE 90 UG/1
2 INHALANT RESPIRATORY (INHALATION) EVERY 6 HOURS PRN
COMMUNITY
Start: 2025-03-04

## 2025-04-10 NOTE — TELEPHONE ENCOUNTER
Can take 7 days after her surgery. Patient has been off Humira for one month so fine to proceed with surgery as scheduled

## 2025-04-10 NOTE — PRE-PROCEDURE INSTRUCTIONS
On the Day of Your Surgery, Friday, 4/17/25, Please Arrive At 7:00 AM     Enter [facility] @ [address] through the Main Entrance, take the lobby elevators to the second floor and check in at the Surgery Registration desk.     Continue to take your home medications as you normally do up to and including the night before surgery with the exception of blood thinning medications.    Blood Thinning Medications:  Please stop prescription blood thinning medications such as Apixaban (Eliquis); Clopidogrel (Plavix); Dabigatran (Pradaxa); Prasugrel (Effient); Rivaroxaban (Xarelto); Ticagrelor (Brilinta); Warfarin (Coumadin) only as directed by your surgeon and/or the prescribing physician    Some common examples of other medications that can thin your blood are: Aspirin, Ibuprofen (Advil, Motrin), Naproxen (Aleve), Meloxicam (Mobic), Celecoxib (Celebrex), Fish Oil, many Herbal Supplements.  These medications should usually be stopped at least 7 days prior to surgery.   Stop Meloxicam seven days before surgery    Stop using OTC pain relievers containing Aspirin, Ibuprofen (Advil, Motrin) or Naproxen (Aleve) seven days prior to surgery.  It is OK to continue using Tylenol for pain the week prior to surgery.    Failure to stop certain medications may interfere with your scheduled surgery.    If you receive instructions from your surgeon regarding what medications to stop prior to surgery, please follow those specific instructions.    If You Have Diabetes:  Do not take any of your diabetic medications, (injectables or by mouth) the morning of surgery unless otherwise instructed by the doctor who manages your diabetes. If you are taking insulin, contact the doctor the manages your diabetes for instructions about any changes to your insulin dosages the day before surgery.    If the doctor wants you to start Trulicity again, don't start it until after surgery    Please take the following medication(s) the  transportation is not acceptable unless you have someone to ride home in the vehicle with you.   For your safety, someone must remain with you for the first 24 hours after your surgery if you receive anesthesia or medication.  If you do not have someone to stay with you, your procedure may be cancelled.  As a patient at [facility] you can expect quality medical and nursing care that is centered on you individual needs.  Our goal is to make your surgical experience as comfortable as possible.    Any questions about preparing for your surgery please call [phone].

## 2025-04-10 NOTE — TELEPHONE ENCOUNTER
Called patient and she states that she has not had her humira for a month now because that is what a pharmacist said to her last month prior to her surgery. She wants to know when can she restart it after her surgery as she is unsure when she would be due next

## 2025-04-11 ENCOUNTER — ANESTHESIA EVENT (OUTPATIENT)
Dept: OPERATING ROOM | Age: 50
End: 2025-04-11
Payer: COMMERCIAL

## 2025-04-11 LAB
EKG ATRIAL RATE: 78 BPM
EKG P AXIS: 67 DEGREES
EKG P-R INTERVAL: 156 MS
EKG Q-T INTERVAL: 382 MS
EKG QRS DURATION: 86 MS
EKG QTC CALCULATION (BAZETT): 435 MS
EKG R AXIS: 43 DEGREES
EKG T AXIS: 30 DEGREES
EKG VENTRICULAR RATE: 78 BPM

## 2025-04-11 NOTE — TELEPHONE ENCOUNTER
Called and spoke with the patient. She voiced understanding and had no further questions at this time.

## 2025-04-17 ENCOUNTER — HOSPITAL ENCOUNTER (OUTPATIENT)
Age: 50
Setting detail: OUTPATIENT SURGERY
Discharge: HOME | End: 2025-04-17
Attending: PLASTIC SURGERY | Admitting: PLASTIC SURGERY
Payer: COMMERCIAL

## 2025-04-17 ENCOUNTER — ANESTHESIA (OUTPATIENT)
Dept: OPERATING ROOM | Age: 50
End: 2025-04-17
Payer: COMMERCIAL

## 2025-04-17 VITALS
TEMPERATURE: 97.2 F | OXYGEN SATURATION: 97 % | SYSTOLIC BLOOD PRESSURE: 139 MMHG | DIASTOLIC BLOOD PRESSURE: 87 MMHG | RESPIRATION RATE: 16 BRPM | HEART RATE: 67 BPM

## 2025-04-17 DIAGNOSIS — D48.5 NEOPLASM OF UNCERTAIN BEHAVIOR OF SKIN: ICD-10-CM

## 2025-04-17 LAB
GLUCOSE BLD-MCNC: 103 MG/DL (ref 65–105)
GLUCOSE BLD-MCNC: 108 MG/DL (ref 65–105)
HCG, PREGNANCY URINE (POC): NEGATIVE

## 2025-04-17 PROCEDURE — 3700000001 HC ADD 15 MINUTES (ANESTHESIA): Performed by: PLASTIC SURGERY

## 2025-04-17 PROCEDURE — 6360000002 HC RX W HCPCS: Performed by: NURSE ANESTHETIST, CERTIFIED REGISTERED

## 2025-04-17 PROCEDURE — 13131 CMPLX RPR F/C/C/M/N/AX/G/H/F: CPT | Performed by: PLASTIC SURGERY

## 2025-04-17 PROCEDURE — 3600000012 HC SURGERY LEVEL 2 ADDTL 15MIN: Performed by: PLASTIC SURGERY

## 2025-04-17 PROCEDURE — 3700000000 HC ANESTHESIA ATTENDED CARE: Performed by: PLASTIC SURGERY

## 2025-04-17 PROCEDURE — 7100000011 HC PHASE II RECOVERY - ADDTL 15 MIN: Performed by: PLASTIC SURGERY

## 2025-04-17 PROCEDURE — 2580000003 HC RX 258: Performed by: ANESTHESIOLOGY

## 2025-04-17 PROCEDURE — 7100000001 HC PACU RECOVERY - ADDTL 15 MIN: Performed by: PLASTIC SURGERY

## 2025-04-17 PROCEDURE — 7100000000 HC PACU RECOVERY - FIRST 15 MIN: Performed by: PLASTIC SURGERY

## 2025-04-17 PROCEDURE — 6370000000 HC RX 637 (ALT 250 FOR IP): Performed by: ANESTHESIOLOGY

## 2025-04-17 PROCEDURE — 82947 ASSAY GLUCOSE BLOOD QUANT: CPT

## 2025-04-17 PROCEDURE — 81025 URINE PREGNANCY TEST: CPT

## 2025-04-17 PROCEDURE — 11440 EXC FACE-MM B9+MARG 0.5 CM/<: CPT | Performed by: PLASTIC SURGERY

## 2025-04-17 PROCEDURE — 2709999900 HC NON-CHARGEABLE SUPPLY: Performed by: PLASTIC SURGERY

## 2025-04-17 PROCEDURE — 2500000003 HC RX 250 WO HCPCS: Performed by: PLASTIC SURGERY

## 2025-04-17 PROCEDURE — 3600000002 HC SURGERY LEVEL 2 BASE: Performed by: PLASTIC SURGERY

## 2025-04-17 PROCEDURE — 11442 EXC FACE-MM B9+MARG 1.1-2 CM: CPT | Performed by: PLASTIC SURGERY

## 2025-04-17 PROCEDURE — 6360000002 HC RX W HCPCS: Performed by: PLASTIC SURGERY

## 2025-04-17 PROCEDURE — 88304 TISSUE EXAM BY PATHOLOGIST: CPT

## 2025-04-17 PROCEDURE — 7100000010 HC PHASE II RECOVERY - FIRST 15 MIN: Performed by: PLASTIC SURGERY

## 2025-04-17 RX ORDER — HALOPERIDOL 5 MG/ML
1 INJECTION INTRAMUSCULAR
Status: DISCONTINUED | OUTPATIENT
Start: 2025-04-17 | End: 2025-04-17 | Stop reason: HOSPADM

## 2025-04-17 RX ORDER — MIDAZOLAM HYDROCHLORIDE 1 MG/ML
INJECTION, SOLUTION INTRAMUSCULAR; INTRAVENOUS
Status: DISCONTINUED | OUTPATIENT
Start: 2025-04-17 | End: 2025-04-17 | Stop reason: SDUPTHER

## 2025-04-17 RX ORDER — FENTANYL CITRATE 50 UG/ML
25 INJECTION, SOLUTION INTRAMUSCULAR; INTRAVENOUS EVERY 5 MIN PRN
Status: DISCONTINUED | OUTPATIENT
Start: 2025-04-17 | End: 2025-04-17 | Stop reason: HOSPADM

## 2025-04-17 RX ORDER — HYDROCODONE BITARTRATE AND ACETAMINOPHEN 5; 325 MG/1; MG/1
1 TABLET ORAL EVERY 4 HOURS PRN
Qty: 18 TABLET | Refills: 0 | Status: SHIPPED | OUTPATIENT
Start: 2025-04-17 | End: 2025-04-20

## 2025-04-17 RX ORDER — SODIUM CHLORIDE 9 MG/ML
INJECTION, SOLUTION INTRAVENOUS PRN
Status: DISCONTINUED | OUTPATIENT
Start: 2025-04-17 | End: 2025-04-17 | Stop reason: HOSPADM

## 2025-04-17 RX ORDER — SODIUM CHLORIDE 9 MG/ML
INJECTION, SOLUTION INTRAVENOUS CONTINUOUS
Status: DISCONTINUED | OUTPATIENT
Start: 2025-04-17 | End: 2025-04-17 | Stop reason: HOSPADM

## 2025-04-17 RX ORDER — LIDOCAINE HYDROCHLORIDE AND EPINEPHRINE 10; 10 MG/ML; UG/ML
INJECTION, SOLUTION INFILTRATION; PERINEURAL PRN
Status: DISCONTINUED | OUTPATIENT
Start: 2025-04-17 | End: 2025-04-17 | Stop reason: ALTCHOICE

## 2025-04-17 RX ORDER — PROPOFOL 10 MG/ML
INJECTION, EMULSION INTRAVENOUS
Status: DISCONTINUED | OUTPATIENT
Start: 2025-04-17 | End: 2025-04-17 | Stop reason: SDUPTHER

## 2025-04-17 RX ORDER — SODIUM CHLORIDE 0.9 % (FLUSH) 0.9 %
5-40 SYRINGE (ML) INJECTION EVERY 12 HOURS SCHEDULED
Status: DISCONTINUED | OUTPATIENT
Start: 2025-04-17 | End: 2025-04-17 | Stop reason: HOSPADM

## 2025-04-17 RX ORDER — LIDOCAINE HYDROCHLORIDE 10 MG/ML
1 INJECTION, SOLUTION EPIDURAL; INFILTRATION; INTRACAUDAL; PERINEURAL
Status: DISCONTINUED | OUTPATIENT
Start: 2025-04-18 | End: 2025-04-17 | Stop reason: HOSPADM

## 2025-04-17 RX ORDER — FENTANYL CITRATE 50 UG/ML
INJECTION, SOLUTION INTRAMUSCULAR; INTRAVENOUS
Status: DISCONTINUED | OUTPATIENT
Start: 2025-04-17 | End: 2025-04-17 | Stop reason: SDUPTHER

## 2025-04-17 RX ORDER — SODIUM CHLORIDE, SODIUM LACTATE, POTASSIUM CHLORIDE, CALCIUM CHLORIDE 600; 310; 30; 20 MG/100ML; MG/100ML; MG/100ML; MG/100ML
INJECTION, SOLUTION INTRAVENOUS CONTINUOUS
Status: DISCONTINUED | OUTPATIENT
Start: 2025-04-17 | End: 2025-04-17 | Stop reason: HOSPADM

## 2025-04-17 RX ORDER — MAGNESIUM HYDROXIDE 1200 MG/15ML
LIQUID ORAL CONTINUOUS PRN
Status: COMPLETED | OUTPATIENT
Start: 2025-04-17 | End: 2025-04-17

## 2025-04-17 RX ORDER — HYDROMORPHONE HYDROCHLORIDE 1 MG/ML
0.5 INJECTION, SOLUTION INTRAMUSCULAR; INTRAVENOUS; SUBCUTANEOUS EVERY 5 MIN PRN
Status: DISCONTINUED | OUTPATIENT
Start: 2025-04-17 | End: 2025-04-17 | Stop reason: HOSPADM

## 2025-04-17 RX ORDER — BALANCED SALT SOLUTION 6.4; .75; .48; .3; 3.9; 1.7 MG/ML; MG/ML; MG/ML; MG/ML; MG/ML; MG/ML
SOLUTION OPHTHALMIC PRN
Status: DISCONTINUED | OUTPATIENT
Start: 2025-04-17 | End: 2025-04-17 | Stop reason: ALTCHOICE

## 2025-04-17 RX ORDER — SODIUM CHLORIDE 0.9 % (FLUSH) 0.9 %
5-40 SYRINGE (ML) INJECTION PRN
Status: DISCONTINUED | OUTPATIENT
Start: 2025-04-17 | End: 2025-04-17 | Stop reason: HOSPADM

## 2025-04-17 RX ORDER — CEPHALEXIN 500 MG/1
500 CAPSULE ORAL 4 TIMES DAILY
Qty: 28 CAPSULE | Refills: 0 | Status: SHIPPED | OUTPATIENT
Start: 2025-04-17 | End: 2025-04-24

## 2025-04-17 RX ORDER — METOCLOPRAMIDE HYDROCHLORIDE 5 MG/ML
10 INJECTION INTRAMUSCULAR; INTRAVENOUS
Status: DISCONTINUED | OUTPATIENT
Start: 2025-04-17 | End: 2025-04-17 | Stop reason: HOSPADM

## 2025-04-17 RX ORDER — LIDOCAINE HYDROCHLORIDE 20 MG/ML
INJECTION, SOLUTION INFILTRATION; PERINEURAL
Status: DISCONTINUED | OUTPATIENT
Start: 2025-04-17 | End: 2025-04-17 | Stop reason: SDUPTHER

## 2025-04-17 RX ORDER — HYDROCODONE BITARTRATE AND ACETAMINOPHEN 5; 325 MG/1; MG/1
1 TABLET ORAL
Status: COMPLETED | OUTPATIENT
Start: 2025-04-17 | End: 2025-04-17

## 2025-04-17 RX ORDER — NALOXONE HYDROCHLORIDE 0.4 MG/ML
INJECTION, SOLUTION INTRAMUSCULAR; INTRAVENOUS; SUBCUTANEOUS PRN
Status: DISCONTINUED | OUTPATIENT
Start: 2025-04-17 | End: 2025-04-17 | Stop reason: HOSPADM

## 2025-04-17 RX ORDER — OXYCODONE HYDROCHLORIDE 5 MG/1
5 TABLET ORAL
Status: DISCONTINUED | OUTPATIENT
Start: 2025-04-17 | End: 2025-04-17 | Stop reason: HOSPADM

## 2025-04-17 RX ADMIN — LIDOCAINE HYDROCHLORIDE 60 MG: 20 INJECTION, SOLUTION INFILTRATION; PERINEURAL at 09:15

## 2025-04-17 RX ADMIN — FENTANYL CITRATE 100 MCG: 50 INJECTION INTRAMUSCULAR; INTRAVENOUS at 09:18

## 2025-04-17 RX ADMIN — PROPOFOL 100 MCG/KG/MIN: 10 INJECTION, EMULSION INTRAVENOUS at 09:15

## 2025-04-17 RX ADMIN — PROPOFOL 150 MG: 10 INJECTION, EMULSION INTRAVENOUS at 09:18

## 2025-04-17 RX ADMIN — SODIUM CHLORIDE, SODIUM LACTATE, POTASSIUM CHLORIDE, AND CALCIUM CHLORIDE: .6; .31; .03; .02 INJECTION, SOLUTION INTRAVENOUS at 07:38

## 2025-04-17 RX ADMIN — HYDROCODONE BITARTRATE AND ACETAMINOPHEN 1 TABLET: 5; 325 TABLET ORAL at 10:29

## 2025-04-17 RX ADMIN — MIDAZOLAM 2 MG: 1 INJECTION INTRAMUSCULAR; INTRAVENOUS at 09:05

## 2025-04-17 RX ADMIN — Medication 3000 MG: at 09:20

## 2025-04-17 ASSESSMENT — ENCOUNTER SYMPTOMS
BACK PAIN: 0
SHORTNESS OF BREATH: 1
ABDOMINAL PAIN: 0
APNEA: 0
BLOOD IN STOOL: 0
CHEST TIGHTNESS: 0
CONSTIPATION: 0
DIARRHEA: 0
SINUS PRESSURE: 0
COUGH: 1
RHINORRHEA: 0
WHEEZING: 1
NAUSEA: 0
TROUBLE SWALLOWING: 0
SORE THROAT: 0
VOMITING: 0

## 2025-04-17 ASSESSMENT — PAIN DESCRIPTION - DESCRIPTORS
DESCRIPTORS: BURNING
DESCRIPTORS: ACHING

## 2025-04-17 ASSESSMENT — PAIN SCALES - GENERAL
PAINLEVEL_OUTOF10: 7
PAINLEVEL_OUTOF10: 6

## 2025-04-17 ASSESSMENT — PAIN DESCRIPTION - LOCATION: LOCATION: OTHER (COMMENT)

## 2025-04-17 ASSESSMENT — PAIN DESCRIPTION - ONSET: ONSET: ON-GOING

## 2025-04-17 ASSESSMENT — PAIN DESCRIPTION - FREQUENCY: FREQUENCY: CONTINUOUS

## 2025-04-17 ASSESSMENT — PAIN DESCRIPTION - ORIENTATION: ORIENTATION: RIGHT

## 2025-04-17 ASSESSMENT — PAIN DESCRIPTION - PAIN TYPE: TYPE: SURGICAL PAIN

## 2025-04-17 ASSESSMENT — PAIN - FUNCTIONAL ASSESSMENT
PAIN_FUNCTIONAL_ASSESSMENT: PREVENTS OR INTERFERES SOME ACTIVE ACTIVITIES AND ADLS
PAIN_FUNCTIONAL_ASSESSMENT: 0-10

## 2025-04-17 ASSESSMENT — LIFESTYLE VARIABLES: SMOKING_STATUS: 1

## 2025-04-17 NOTE — H&P
History and Physical Service   [facility]    HISTORY AND PHYSICAL EXAMINATION            Date of Evaluation: 4/17/2025  Patient name:  Lennie Johnson  MRN:   [id number]  YOB: 1975  PCP:    John Mcfadden MD    History Obtained From:     Patient, medical records    History of Present Illness:     This is Lennie Johnson a 49 y.o. female who presents today for a EXCISION RIGHT TEMPLE CYST, COMPLEX CLOSURE, POSSIBLE GRAFT AND POSSIBLE FLAP by Brie Nelson MD for Neoplasm of uncertain behavior of skin. Patient reports a mass of her right lateral eye that has been present for several years and has gradually increased in size. The mass causes irritation and discomfort and she presents today for removal. Known diabetes, noncompliant with treatment, POC . Denies any current blood thinning medications. Last Humira 2/5/2025.    Past Medical History:     Past Medical History:   Diagnosis Date    Abnormal uterine bleeding (AUB)     Anemia     Arthritis     KNEE AND BACK    Asthma     Diabetes mellitus (HCC)     managed by PCP; not taking Metformin or Trulicity at time of PAT visit doesn't know if she even has a current prescription; does not check blood sugar at home    GERD (gastroesophageal reflux disease)     not recently    Hidradenitis suppurativa     takes Humira    History of blood transfusion 1997    Hyperlipidemia     Hypertension     managed by PCP    Leg swelling     Obesity, morbid, BMI 50 or higher     Sleep apnea     isn't using, doesn't have a mask for it    Wears dentures     never wears them    Wellness examination 07/12/2021    pcp-[facility]-last visit june 2021        Past Surgical History:     Past Surgical History:   Procedure Laterality Date    ABSCESS DRAINAGE  12/23/2016    BUTTOCK    DILATION AND CURETTAGE OF UTERUS N/A 11/18/2019    DILATATION AND CURETTAGE HYSTEROSCOPY, MYOSURE, MIRENA IUD INSERTION performed by Dimitris Louis MD at [facility] OR     When taking the prescribed pain medications after surgery, realize that they can affect your thought process and coordination.  Do not drive, do not operate complex equipment, do not make any important decisions, and do not drink any alcohol while taking these medications.  Be sure to take your prescribed medication only as directed.    ADDITIONAL SURGERY NECESSARY  Should complications occur, additional surgery or other treatments may be necessary.  Even though risks and complications occur infrequently, the risks cited are the ones that are particularly associated with skin lesion and skin tumor removal.  Other complications and risks can occur but are even more uncommon.  The practice of medicine and surgery is not an exact science.  Although good results are expected, there is no guarantee or warranty expressed or implied, on the results that may be obtained.  PATIENT COMPLIANCE   Follow all physician instructions carefully; this is essential for the success of your outcome. It is important that the surgical incisions are not subjected to excessive force, swelling, abrasion, or motion during the time of healing.  Protective dressings and drains should not be removed unless instructed by your plastic surgeon.  Successful post-operative function depends on both surgery and subsequent care.  Physical activity that increases your pulse or heart rate may cause bruising, swelling, fluid accumulation and the need for return to surgery.  It is wise to refrain from intimate physical activities after surgery until your physician states it is safe.  It is important that you participate in follow-up care, return for aftercare, and promote your recovery after surgery.       HEALTH INSURANCE  Most health insurance companies exclude coverage for cosmetic surgical operations or any complications that might occur from surgery.  Please carefully review your health insurance subscriber information pamphlet.  Most insurance plans

## 2025-04-17 NOTE — OP NOTE
Operative Note      Patient: Lennie Johnson  YOB: 1975  MRN: [id number]    Date of Procedure: 4/17/2025    Pre-Op Diagnosis Codes:      * Neoplasm of uncertain behavior of skin [D48.5]  Lesion right lateral canthus  Post-Op Diagnosis: Same       Procedure(s):  EXCISION RIGHT TEMPLE CYST MEASURING 1.5 CM X 1 CM WITH COMPLEX CLOSURE OF DEFECT SIZE MEASURING 1.7 CM X 1.1 CM   AND RIGHT LATERAL EYE CYST measuring 0.5 x 0.5 cm with simple closure    Surgeon(s):  Brie Nelson MD    Assistant:   Surgical Assistant: Declan Mai    Anesthesia: General    Estimated Blood Loss (mL): Minimal    Complications: None    Specimens:   ID Type Source Tests Collected by Time Destination   A : RIGHT TEMPORAL MASS SUTURE AT 12:00 Tissue Face SURGICAL PATHOLOGY Brie Nelson MD 4/17/2025 0934    B : RIGHT EYE LATERAL MASS Tissue Eye SURGICAL PATHOLOGY Brie Nelson MD 4/17/2025 0935        Implants:  * No implants in log *      Drains: * No LDAs found *        INDICATION FOR PROCEDURE:  The patient is 49 y.o. female .   All the risks, benefits, and alternative treatments were explained to the patient and an informed consent was obtained.    DESCRIPTION OF PROCEDURE:  The patient was marked in the holding area.   The patient was brought into the room, was placed on the table in the supine position.  SCDs were placed and turned on prior to induction of anesthesia via LMA.  The all areas were prepped and draped in usual customary sterile manner.  A timeout was performed.  Everybody in the room was in agreement with the timeout.      Then local anesthetic was injected..  The attention was turned to right temporal area.   Then an incision was made over the area.  This was done using a #15 blade.  .  The lesion was completely excised.  Suture was placed at the 12 o'clock position.   Hemostasis was achieved.   Then undermining was performed medially, laterally, superiorly, and inferiorly to obtain tension-free closure.

## 2025-04-17 NOTE — DISCHARGE INSTRUCTIONS
POST-OPERATIVE INSTRUCTIONS FOR SUTURE LINECARE   Incisions and suture lines are a necessary part of surgery.  These lines take many months to fully heal. Part of the healing process requires proper cleansing and care.  In addition, there are treatments that can help resulting scars to be flatter, finer, and less noticeable. There is no guarantee to what a scar will look like once it has fully healed, however the following instructions are important to a good outcome.  Do not smoke.  You have been advised to quit before surgery.  Do not start after surgery.  Smoking decreases the oxygen in your blood and greatly impacts your ability to heal.  Do not smoke until your incisions have fully healed.       You may shower 24-48 hours post your procedure unless instructed other wise.  You may remove the outer dressings to shower.  If you have Steri-Strips in place keep them intact.  If you have no visible sutures but only tape (steri-strips) over the incision, you do not need to do anything until the strips fall off.  You may use antibiotic ointment on the area after the steri-strips have fallen off.        If you have sutures, they will remain in the skin anywhere from 7-21 days, or longer depending on the area and your healing.  Please call for an appointment if one has not been scheduled.  You may notice a slight drainage of either blood or a clear type of liquid from the area, which is normal.  If the drainage has an odor to it call the office.   DIET: Resume your previous diet.   ACTIVITY: Avoid straining, strenuous exercise, or lifting over five pounds for 14 days, unless instructed otherwise.  Depending on they type of surgery you have had this may be as long as SIX WEEKS.   (Do not bend over for the first 24 hours after your surgery).  Direct trauma and physical stress may result in a separation of the suture line or wider scars.     MEDICATIONS: If you have been given an antibiotic, take until finished.

## 2025-04-17 NOTE — H&P
[facility] PHYSICIANS [facility], [facility] SURGICAL SPECIALISTS  [address] [place] [address]       History and Physical     No chief complaint on file.       HPI:   Lennie Johnson is a 49 y.o. female who presents with a mass on the right lateral eye.  The mass has been present for several years it continues to grow.  Patient is here for evaluation treatment.  It does cause constant irritation and causes patient pain and discomfort..    Medications:     Current Facility-Administered Medications   Medication Dose Route Frequency Provider Last Rate Last Admin    [START ON 4/18/2025] lidocaine PF 1 % injection 1 mL  1 mL IntraDERmal Once PRN Edilma Abel MD        0.9 % sodium chloride infusion   IntraVENous Continuous Edilma Abel MD        lactated ringers infusion   IntraVENous Continuous Edimla Abel  mL/hr at 04/17/25 0825 NoRateChange at 04/17/25 0825    sodium chloride flush 0.9 % injection 5-40 mL  5-40 mL IntraVENous 2 times per day Edilma Abel MD        sodium chloride flush 0.9 % injection 5-40 mL  5-40 mL IntraVENous PRN Edilma Abel MD        0.9 % sodium chloride infusion   IntraVENous PRN Edilma Abel MD        ceFAZolin (ANCEF) 3000 mg in sterile water 30 mL IV syringe  3,000 mg IntraVENous Once Brie Nelson MD          Allergies:   No Known Allergies  Review of Systems:   Constitutional: Negative for fever, chills, fatigue and unexpected weight change.   HENT: Negative for hearing loss, sore throat and facial swelling.    Eyes: Negative for pain and discharge.   Respiratory: Patient with a history of asthma and sleep apnea  Cardiovascular: Patient with a history of hypertension hyperlipidemia, leg swelling  Gastrointestinal: Patient with history of GERD.   Skin: Negative for pallor and rash.   Neurological: Negative for seizures, syncope and numbness.

## 2025-04-17 NOTE — ANESTHESIA PRE PROCEDURE
08/11/2013 02:39 PM       HCG (If Applicable):   Lab Results   Component Value Date    PREGTESTUR negative 07/02/2018    PREGSERUM NEGATIVE 11/02/2023    HCGQUANT <1 08/16/2019        ABGs: No results found for: \"PHART\", \"PO2ART\", \"ONR3USE\", \"SLI5PSM\", \"BEART\", \"T1ILFMGV\"     Type & Screen (If Applicable):  No results found for: \"LABABO\"    Drug/Infectious Status (If Applicable):  Lab Results   Component Value Date/Time    HEPCAB NONREACTIVE 08/20/2024 03:24 PM       COVID-19 Screening (If Applicable):   Lab Results   Component Value Date/Time    COVID19 Not Detected 02/13/2025 02:39 PM           Anesthesia Evaluation  Patient summary reviewed   no history of anesthetic complications:   Airway: Mallampati: II  TM distance: >3 FB   Neck ROM: full  Mouth opening: > = 3 FB   Dental:          Pulmonary:normal exam    (+)     sleep apnea: on noncompliant,       asthma: seasonal asthma, current smoker (tobacco and mj)                          ROS comment: Marijuana     Cardiovascular:    (+) hypertension: no interval change, hyperlipidemia    (-)  angina      Rhythm: regular  Rate: normal                    Neuro/Psych:   Negative Neuro/Psych ROS              GI/Hepatic/Renal:   (+) GERD: no interval change, morbid obesity          Endo/Other:    (+) DiabetesType II DM, no interval change, : arthritis:..                 Abdominal:   (+) obese          Vascular: negative vascular ROS.         Other Findings:             Anesthesia Plan      general     ASA 3       Induction: intravenous.    MIPS: Postoperative opioids intended and Prophylactic antiemetics administered.  Anesthetic plan and risks discussed with patient.      Plan discussed with CRNA.                    FLORIAN MEAD MD   4/17/2025

## 2025-04-17 NOTE — ANESTHESIA POSTPROCEDURE EVALUATION
Department of Anesthesiology  Postprocedure Note    Patient: Lennie Johnson  MRN: [id number]  YOB: 1975  Date of evaluation: 4/17/2025    Procedure Summary       Date: 04/17/25 Room / Location: [address]    Anesthesia Start: 0905 Anesthesia Stop: 1002    Procedure: EXCISION RIGHT TEMPLE CYST AND RIGHT LATERAL EYE CYST (Right: Eye) Diagnosis:       Neoplasm of uncertain behavior of skin      (Neoplasm of uncertain behavior of skin [D48.5])    Surgeons: Brie Nelson MD Responsible Provider: Manav Gonzalez MD    Anesthesia Type: General ASA Status: 3            Anesthesia Type: General    Deejay Phase I: Deejay Score: 10    Deejay Phase II: Deejay Score: 10    Anesthesia Post Evaluation    Patient location during evaluation: PACU  Patient participation: complete - patient participated  Level of consciousness: awake  Airway patency: patent  Nausea & Vomiting: no nausea  Cardiovascular status: blood pressure returned to baseline  Respiratory status: acceptable  Hydration status: euvolemic  Comments: Multimodal analgesia pain management as indicated by procedure  Multimodal analgesia pain management approach  Pain management: adequate    No notable events documented.

## 2025-04-18 LAB — SURGICAL PATHOLOGY REPORT: NORMAL

## 2025-04-21 ENCOUNTER — RESULTS FOLLOW-UP (OUTPATIENT)
Dept: WOUND CARE | Age: 50
End: 2025-04-21

## 2025-04-30 ENCOUNTER — OFFICE VISIT (OUTPATIENT)
Dept: SURGERY | Age: 50
End: 2025-04-30

## 2025-04-30 VITALS
DIASTOLIC BLOOD PRESSURE: 78 MMHG | WEIGHT: 293 LBS | HEIGHT: 60 IN | SYSTOLIC BLOOD PRESSURE: 115 MMHG | OXYGEN SATURATION: 98 % | BODY MASS INDEX: 57.52 KG/M2 | HEART RATE: 69 BPM

## 2025-04-30 DIAGNOSIS — Z98.890 POSTOPERATIVE STATE: Primary | ICD-10-CM

## 2025-04-30 PROCEDURE — 99024 POSTOP FOLLOW-UP VISIT: CPT | Performed by: PLASTIC SURGERY

## 2025-04-30 NOTE — PROGRESS NOTES
[facility] SURGICAL SPECIALISTS  [address]  [facility] [address]       OFFICE POST-OP NOTE    Patient Name:  Lennie Johnson    :  1975    MRN:  [id number]  STATUS POST  Chief Complaint   Patient presents with    Post-Op Check     Excision right temple cyst DOS 25       SUBJECTIVE  Patient seen and examined.    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  I also discussed that the hydrocystoma's have a chance of recurrence.  The pathology was discussed with the patient.  A copy was provided to the patient.    PHYSICAL EXAM  Vital Signs:  /78 (BP Site: Left Lower Arm, Patient Position: Sitting, BP Cuff Size: Large Adult)   Pulse 69   Ht 1.524 m (5')   Wt 136.1 kg (300 lb)   SpO2 98%   BMI 58.59 kg/m²     Incisions:  Suture line clean dry and intact.    Skin:  No evidence of infection.   Neurologic:  Alert & oriented x 3.    Pathology:  SURGICAL PATHOLOGY REPORT  Order: [id number]   Status: Final result       Next appt: 2025 at 10:15 AM in Dermatology (Laine Alvarez MD)    Test Result Released: Yes (seen)    2 Result Notes       View Follow-Up Encounter      Component  Ref Range & Units (hover) 25 0934   Surgical Pathology Report Path Number: [id number]    -- Diagnosis --  A.  Skin right temporal area, excision:    -Epithelial inclusion cyst.    B.  Skin right eye lateral, excision:    - Hidrocystoma.         Sam Suresh M.D.  **Electronically Signed Out**        rdd/2025     Clinical Information  Pre-Op Diagnosis:  NEOPLASM OF UNCERTAIN BEHAVIOR OF SKIN  Operative Findings:  RIGHT

## 2025-05-01 ENCOUNTER — OFFICE VISIT (OUTPATIENT)
Age: 50
End: 2025-05-01
Payer: COMMERCIAL

## 2025-05-01 VITALS
WEIGHT: 293 LBS | DIASTOLIC BLOOD PRESSURE: 71 MMHG | BODY MASS INDEX: 57.52 KG/M2 | HEIGHT: 60 IN | OXYGEN SATURATION: 98 % | SYSTOLIC BLOOD PRESSURE: 115 MMHG | HEART RATE: 72 BPM | TEMPERATURE: 97.5 F

## 2025-05-01 DIAGNOSIS — Z79.899 HIGH RISK MEDICATION USE: Primary | ICD-10-CM

## 2025-05-01 DIAGNOSIS — L83 ACANTHOSIS NIGRICANS: ICD-10-CM

## 2025-05-01 DIAGNOSIS — L70.0 ACNE VULGARIS: ICD-10-CM

## 2025-05-01 DIAGNOSIS — L73.2 HIDRADENITIS: ICD-10-CM

## 2025-05-01 DIAGNOSIS — L73.2 HIDRADENITIS SUPPURATIVA: ICD-10-CM

## 2025-05-01 PROCEDURE — 11900 INJECT SKIN LESIONS </W 7: CPT | Performed by: DERMATOLOGY

## 2025-05-01 PROCEDURE — 99213 OFFICE O/P EST LOW 20 MIN: CPT | Performed by: DERMATOLOGY

## 2025-05-01 NOTE — PATIENT INSTRUCTIONS
- continue benzoyl peroxide 5% wash to affected areas daily  - continue clindamycin 1% lotion to affected area daily after showering  - continue Humira, no SE or infection  Discussed biologic medications for psoriasis. Counseled patient that biologics suppress the immune system and increase risk of infection, which may be serious or even fatal. Biologics may also increase risk for certain cancers. TNF-alpha inhibitors (Enbrel, Humira, Cimzia, Remicade) also carry a risk of lupus-like reaction, demyelinating disease, and exacerbation of congestive heart failure. IL-17 inhibitors (Cosentyx and Taltz) may worsen inflammatory bowel disease.  Intralesional Injection: After discussion of risks including atrophy and dyspigmentation, patient gives verbal consent to proceed. After cleansing with alcohol the HS nodules on the left submammary and abdominal skin fold were injected with 0.7 ml of Kenalog 10 mg/mL

## 2025-05-01 NOTE — PROGRESS NOTES
body habitus for hscope, D&C with mirena IUD insertion. Pre-op clearance needed from [facility] IM physicians.      Hyperlipidemia 12/23/2016    Hypertension 12/23/2016    Diabetes mellitus (HCC) 12/23/2016    GERD (gastroesophageal reflux disease) 12/23/2016    Obesity, morbid, BMI 50 or higher (HCC) 12/23/2016    Hidradenitis suppurativa 12/23/2016    DALILA (obstructive sleep apnea) 12/23/2016     CPAP use at 16      Perirectal abscess 12/23/2016    Iron deficiency anemia 12/23/2016    Dysmenorrhea 12/23/2016    Menorrhagia 12/23/2016    Acute cystitis without hematuria 12/23/2016       CURRENT MEDICATIONS:   Current Outpatient Medications   Medication Sig Dispense Refill    SYMBICORT 160-4.5 MCG/ACT AERO Inhale 2 puffs into the lungs 2 times daily      albuterol sulfate HFA (PROVENTIL;VENTOLIN;PROAIR) 108 (90 Base) MCG/ACT inhaler Inhale 2 puffs into the lungs every 6 hours as needed for Shortness of Breath or Wheezing      ibuprofen (ADVIL;MOTRIN) 600 MG tablet Take 1 tablet by mouth 3 times daily as needed for Pain 30 tablet 0    adalimumab (HUMIRA, 2 PEN,) 80 MG/0.8ML AJKT injection pen kit Inject 80 mg (1 pen) SC every 14 days 1.6 mL 5    adalimumab (HUMIRA-CD/UC/HS STARTER) 80 MG/0.8ML PNKT Inject 160 mg SC x 1 on day 1, then 80 mg SC x 1 on day 15 3 each 0    adalimumab (HUMIRA, 2 PEN,) 80 MG/0.8ML PNKT Inject 80 mg SC q2wk starting on day 29 2 each 2    benzoyl peroxide 5 % external liquid Wash affected areas once daily 227 g 3    clindamycin (CLEOCIN T) 1 % lotion Apply to affected areas daily 60 mL 3    clobetasol (TEMOVATE) 0.05 % ointment Apply to rash on legs twice daily (not face, armpit or groin) 60 g 2    meloxicam (MOBIC) 15 MG tablet Take 1 tablet by mouth daily      lisinopril (PRINIVIL;ZESTRIL) 10 MG tablet Take 1 tablet by mouth daily      tiZANidine (ZANAFLEX) 4 MG tablet Take 1 tablet by mouth every 8 hours as needed (muscle spasms) 90 tablet 0    buPROPion (WELLBUTRIN XL) 150 MG extended release

## 2025-05-02 RX ORDER — CLINDAMYCIN PHOSPHATE 10 UG/ML
LOTION TOPICAL
Qty: 60 ML | Refills: 3 | Status: SHIPPED | OUTPATIENT
Start: 2025-05-02

## 2025-05-02 RX ORDER — ADALIMUMAB 80MG/0.8ML
KIT SUBCUTANEOUS
Qty: 1.6 ML | Refills: 5 | Status: SHIPPED | OUTPATIENT
Start: 2025-05-02

## 2025-05-16 ENCOUNTER — OFFICE VISIT (OUTPATIENT)
Dept: SURGERY | Age: 50
End: 2025-05-16

## 2025-05-16 VITALS
HEART RATE: 64 BPM | BODY MASS INDEX: 57.52 KG/M2 | WEIGHT: 293 LBS | DIASTOLIC BLOOD PRESSURE: 75 MMHG | HEIGHT: 60 IN | SYSTOLIC BLOOD PRESSURE: 121 MMHG

## 2025-05-16 DIAGNOSIS — Z98.890 POSTOPERATIVE STATE: Primary | ICD-10-CM

## 2025-05-16 PROCEDURE — 99024 POSTOP FOLLOW-UP VISIT: CPT | Performed by: PLASTIC SURGERY

## 2025-05-16 RX ORDER — SENNOSIDES 8.6 MG
TABLET ORAL
COMMUNITY
Start: 2025-02-19

## 2025-05-16 RX ORDER — LISINOPRIL 20 MG/1
TABLET ORAL
COMMUNITY
Start: 2025-02-19

## 2025-05-16 RX ORDER — SULFAMETHOXAZOLE AND TRIMETHOPRIM 800; 160 MG/1; MG/1
TABLET ORAL
COMMUNITY
Start: 2025-03-04

## 2025-05-16 RX ORDER — BENZONATATE 200 MG/1
CAPSULE ORAL
COMMUNITY
Start: 2025-03-04

## 2025-05-16 NOTE — PROGRESS NOTES
[facility] SURGICAL SPECIALISTS  [address]  [facility] [address]       OFFICE POST-OP NOTE    Patient Name:  Lennie Johnson    :  1975    MRN:  [id number]  STATUS POST  Chief Complaint   Patient presents with    Post-Op Check     Excision right temple cyst DOS 25       SUBJECTIVE  Patient seen and examined.    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  I also discussed that the hydrocystoma's have a chance of recurrence.  The pathology was discussed with the patient.  A copy was provided to the patient.    PHYSICAL EXAM  Vital Signs:  /75 (BP Site: Left Lower Arm, Patient Position: Sitting, BP Cuff Size: Large Adult)   Pulse 64   Ht 1.524 m (5')   Wt (!) 138.8 kg (306 lb)   BMI 59.76 kg/m²     Incisions: Patient has healed well.  Skin:  No evidence of infection.   Neurologic:  Alert & oriented x 3.    Pathology:  SURGICAL PATHOLOGY REPORT  Order: [id number]   Status: Final result       Next appt: 2025 at 10:15 AM in Dermatology (Laine Alvarez MD)    Test Result Released: Yes (seen)    2 Result Notes       View Follow-Up Encounter      Component  Ref Range & Units (hover) 25 0934   Surgical Pathology Report Path Number: [id number]    -- Diagnosis --  A.  Skin right temporal area, excision:    -Epithelial inclusion cyst.    B.  Skin right eye lateral, excision:    - Hidrocystoma.         Sam Suresh M.D.  **Electronically Signed Out**        rdd/2025     Clinical Information  Pre-Op Diagnosis:  NEOPLASM OF UNCERTAIN BEHAVIOR OF SKIN  Operative Findings:  RIGHT TEMPORAL MASS SUTURE

## 2025-05-22 ENCOUNTER — TELEPHONE (OUTPATIENT)
Age: 50
End: 2025-05-22

## 2025-05-22 NOTE — TELEPHONE ENCOUNTER
[facility] advised PA for Aklief denied.   Unable to reach patient by phone. Sent [employer] message advising tretinoin sent and to combine with Vaseline when applying to skin folds.

## 2025-05-22 NOTE — TELEPHONE ENCOUNTER
----- Message from Dr. Laine Alvarez MD sent at 5/20/2025 12:57 PM EDT -----  Sent tretinoin. Needs to mix with vaseline to help her tolerate on skin folds  ----- Message -----  From: Yamila Gonzalez MA  Sent: 5/20/2025  11:48 AM EDT  To: Laine Alvarez MD    Appeal?

## 2025-05-26 SDOH — ECONOMIC STABILITY: INCOME INSECURITY: IN THE LAST 12 MONTHS, WAS THERE A TIME WHEN YOU WERE NOT ABLE TO PAY THE MORTGAGE OR RENT ON TIME?: PATIENT DECLINED

## 2025-05-26 SDOH — ECONOMIC STABILITY: TRANSPORTATION INSECURITY
IN THE PAST 12 MONTHS, HAS THE LACK OF TRANSPORTATION KEPT YOU FROM MEDICAL APPOINTMENTS OR FROM GETTING MEDICATIONS?: PATIENT DECLINED

## 2025-05-26 SDOH — ECONOMIC STABILITY: FOOD INSECURITY: WITHIN THE PAST 12 MONTHS, YOU WORRIED THAT YOUR FOOD WOULD RUN OUT BEFORE YOU GOT MONEY TO BUY MORE.: PATIENT DECLINED

## 2025-05-26 SDOH — ECONOMIC STABILITY: FOOD INSECURITY: WITHIN THE PAST 12 MONTHS, THE FOOD YOU BOUGHT JUST DIDN'T LAST AND YOU DIDN'T HAVE MONEY TO GET MORE.: PATIENT DECLINED

## 2025-05-26 SDOH — ECONOMIC STABILITY: TRANSPORTATION INSECURITY
IN THE PAST 12 MONTHS, HAS LACK OF TRANSPORTATION KEPT YOU FROM MEETINGS, WORK, OR FROM GETTING THINGS NEEDED FOR DAILY LIVING?: PATIENT DECLINED

## 2025-05-29 ENCOUNTER — HOSPITAL ENCOUNTER (OUTPATIENT)
Age: 50
Setting detail: SPECIMEN
Discharge: HOME | End: 2025-05-29

## 2025-05-29 ENCOUNTER — OFFICE VISIT (OUTPATIENT)
Age: 50
End: 2025-05-29
Payer: COMMERCIAL

## 2025-05-29 VITALS
HEART RATE: 76 BPM | DIASTOLIC BLOOD PRESSURE: 75 MMHG | WEIGHT: 293 LBS | BODY MASS INDEX: 59.45 KG/M2 | SYSTOLIC BLOOD PRESSURE: 108 MMHG

## 2025-05-29 DIAGNOSIS — N89.8 VAGINAL DISCHARGE: ICD-10-CM

## 2025-05-29 DIAGNOSIS — Z20.2 ENCOUNTER FOR ASSESSMENT OF STD EXPOSURE: ICD-10-CM

## 2025-05-29 DIAGNOSIS — Z01.419 WELL WOMAN EXAM WITH ROUTINE GYNECOLOGICAL EXAM: Primary | ICD-10-CM

## 2025-05-29 LAB
HBV SURFACE AB SERPL IA-ACNC: <3.5 MIU/ML
HIV 1+2 AB+HIV1 P24 AG SERPL QL IA: NONREACTIVE

## 2025-05-29 PROCEDURE — 99396 PREV VISIT EST AGE 40-64: CPT | Performed by: STUDENT IN AN ORGANIZED HEALTH CARE EDUCATION/TRAINING PROGRAM

## 2025-05-29 PROCEDURE — 3074F SYST BP LT 130 MM HG: CPT | Performed by: STUDENT IN AN ORGANIZED HEALTH CARE EDUCATION/TRAINING PROGRAM

## 2025-05-29 PROCEDURE — 3078F DIAST BP <80 MM HG: CPT | Performed by: STUDENT IN AN ORGANIZED HEALTH CARE EDUCATION/TRAINING PROGRAM

## 2025-05-29 RX ORDER — FLUCONAZOLE 150 MG/1
150 TABLET ORAL ONCE
Qty: 1 TABLET | Refills: 0 | Status: SHIPPED | OUTPATIENT
Start: 2025-05-29 | End: 2025-05-29

## 2025-05-29 NOTE — PROGRESS NOTES
[facility] OB/GYN Annual Visit    Lennie Johnson  2025                       Primary Care Physician: John Mcfadden MD    CC:   Chief Complaint   Patient presents with    Annual Exam         HPI: Lennie Johnson is a 49 y.o. female     The patient was seen and examined. She is here for an annual visit. She is complaining of nothing at this time.      No LMP recorded. Patient has had an ablation.  Patient reports that since her ablation she has intermittent periods which are usually very light she reports she has not had any bleeding for approximately 6 months.  She denies any pelvic pain or complaints at this time.     Her bowel habits are regular. She denies any bloating.  She denies dysuria. She denies urinary leaking.  She complains of vaginal discharge.  She is sexually active.  She uses bilateral risk reducing salpingectomy for contraception and is not desiring pregnancy.    Depression Screen: Symptoms of decreased mood absent  Symptoms of anhedonia absent  **If either question is answered in a  positive fashion then complete the PHQ9 Scoring Evaluation and make the appropriate referral**    REVIEW OF SYSTEMS:   Constitutional: negative fever, negative chills, negative weight changes   HEENT: negative visual disturbances, negative headaches, negative dizziness  Breast: negative breast abnormalities, negative breast lumps, negative nipple discharge  Respiratory: negative dyspnea, negative cough, negative SOB  Cardiovascular: negative chest pain,  negative palpitations, negative arrhythmia, negative syncope   Gastrointestinal: negative abdominal pain, negative RUQ pain, negative N/V, negative diarrhea, negative constipation, negative bowel changes  Genitourinary: negative dysuria, negative hematuria, negative urinary incontinence, negative vaginal discharge, negative vaginal bleeding  Dermatological: negative rash, negative pruritis, negative mole or other skin changes  Hematologic: negative

## 2025-05-30 ENCOUNTER — TELEPHONE (OUTPATIENT)
Age: 50
End: 2025-05-30

## 2025-05-30 LAB
C TRACH DNA SPEC QL PROBE+SIG AMP: NEGATIVE
CANDIDA SPECIES: NEGATIVE
GARDNERELLA VAGINALIS: POSITIVE
N GONORRHOEA DNA SPEC QL PROBE+SIG AMP: NEGATIVE
SOURCE: ABNORMAL
SPECIMEN DESCRIPTION: NORMAL
T PALLIDUM AB SER QL IA: NONREACTIVE
TRICHOMONAS: POSITIVE

## 2025-06-02 RX ORDER — METRONIDAZOLE 500 MG/1
500 TABLET ORAL 2 TIMES DAILY
Qty: 14 TABLET | Refills: 0 | Status: SHIPPED | OUTPATIENT
Start: 2025-06-02 | End: 2025-06-09

## 2025-06-02 NOTE — PROGRESS NOTES
Attending Physician Statement  I have discussed the care of Lennie Johnson, including pertinent history and exam findings,  with the resident. I have reviewed the key elements of all parts of the encounter with the resident.  I agree with the assessment, plan and orders as documented by the resident.  (GE Modifier)    Risa Michelle,

## 2025-06-07 ENCOUNTER — HOSPITAL ENCOUNTER (OUTPATIENT)
Age: 50
Discharge: HOME | End: 2025-06-07
Payer: COMMERCIAL

## 2025-06-07 LAB
ALBUMIN SERPL-MCNC: 4.1 G/DL (ref 3.5–5.2)
ALBUMIN/GLOB SERPL: 1.2 {RATIO} (ref 1–2.5)
ALP SERPL-CCNC: 69 U/L (ref 35–104)
ALT SERPL-CCNC: 20 U/L (ref 10–35)
ANION GAP SERPL CALCULATED.3IONS-SCNC: 11 MMOL/L (ref 9–16)
AST SERPL-CCNC: 22 U/L (ref 10–35)
BILIRUB DIRECT SERPL-MCNC: 0.1 MG/DL (ref 0–0.2)
BILIRUB INDIRECT SERPL-MCNC: 0.1 MG/DL (ref 0–1)
BILIRUB SERPL-MCNC: 0.2 MG/DL (ref 0–1.2)
BUN SERPL-MCNC: 14 MG/DL (ref 6–20)
CALCIUM SERPL-MCNC: 9 MG/DL (ref 8.6–10.4)
CEA SERPL-MCNC: 0.8 NG/ML (ref 0–3.8)
CHLORIDE SERPL-SCNC: 103 MMOL/L (ref 98–107)
CHOLEST SERPL-MCNC: 164 MG/DL (ref 0–199)
CHOLESTEROL/HDL RATIO: 2.6
CO2 SERPL-SCNC: 27 MMOL/L (ref 20–31)
CREAT SERPL-MCNC: 0.6 MG/DL (ref 0.6–0.9)
CREAT UR-MCNC: 151 MG/DL (ref 28–217)
ERYTHROCYTE [DISTWIDTH] IN BLOOD BY AUTOMATED COUNT: 15.8 % (ref 11.8–14.4)
ERYTHROCYTE [SEDIMENTATION RATE] IN BLOOD BY PHOTOMETRIC METHOD: 35 MM/HR (ref 0–20)
EST. AVERAGE GLUCOSE BLD GHB EST-MCNC: 140 MG/DL
GFR, ESTIMATED: >90 ML/MIN/1.73M2
GLOBULIN SER CALC-MCNC: 3.3 G/DL
GLUCOSE SERPL-MCNC: 113 MG/DL (ref 74–99)
HBA1C MFR BLD: 6.5 % (ref 4–6)
HCT VFR BLD AUTO: 37.3 % (ref 36.3–47.1)
HDLC SERPL-MCNC: 64 MG/DL
HGB BLD-MCNC: 11.8 G/DL (ref 11.9–15.1)
LDLC SERPL CALC-MCNC: 82 MG/DL (ref 0–100)
MCH RBC QN AUTO: 30.2 PG (ref 25.2–33.5)
MCHC RBC AUTO-ENTMCNC: 31.6 G/DL (ref 28.4–34.8)
MCV RBC AUTO: 95.4 FL (ref 82.6–102.9)
MICROALBUMIN UR-MCNC: <12 MG/L (ref 0–20)
MICROALBUMIN/CREAT UR-RTO: NORMAL MCG/MG CREAT (ref 0–25)
NRBC BLD-RTO: 0 PER 100 WBC
PLATELET # BLD AUTO: 238 K/UL (ref 138–453)
PMV BLD AUTO: 12.4 FL (ref 8.1–13.5)
POTASSIUM SERPL-SCNC: 4 MMOL/L (ref 3.7–5.3)
PROT SERPL-MCNC: 7.4 G/DL (ref 6.6–8.7)
RBC # BLD AUTO: 3.91 M/UL (ref 3.95–5.11)
SODIUM SERPL-SCNC: 141 MMOL/L (ref 136–145)
TRIGL SERPL-MCNC: 92 MG/DL
VLDLC SERPL CALC-MCNC: 18 MG/DL (ref 1–30)
WBC OTHER # BLD: 8.7 K/UL (ref 3.5–11.3)

## 2025-06-07 PROCEDURE — 85652 RBC SED RATE AUTOMATED: CPT

## 2025-06-07 PROCEDURE — 82378 CARCINOEMBRYONIC ANTIGEN: CPT

## 2025-06-07 PROCEDURE — 80061 LIPID PANEL: CPT

## 2025-06-07 PROCEDURE — 85027 COMPLETE CBC AUTOMATED: CPT

## 2025-06-07 PROCEDURE — 82570 ASSAY OF URINE CREATININE: CPT

## 2025-06-07 PROCEDURE — 80076 HEPATIC FUNCTION PANEL: CPT

## 2025-06-07 PROCEDURE — 82043 UR ALBUMIN QUANTITATIVE: CPT

## 2025-06-07 PROCEDURE — 36415 COLL VENOUS BLD VENIPUNCTURE: CPT

## 2025-06-07 PROCEDURE — 83036 HEMOGLOBIN GLYCOSYLATED A1C: CPT

## 2025-06-07 PROCEDURE — 80048 BASIC METABOLIC PNL TOTAL CA: CPT

## 2025-06-12 ENCOUNTER — HOSPITAL ENCOUNTER (EMERGENCY)
Age: 50
Discharge: HOME | End: 2025-06-12
Attending: STUDENT IN AN ORGANIZED HEALTH CARE EDUCATION/TRAINING PROGRAM
Payer: COMMERCIAL

## 2025-06-12 VITALS
DIASTOLIC BLOOD PRESSURE: 63 MMHG | HEIGHT: 60 IN | RESPIRATION RATE: 18 BRPM | HEART RATE: 67 BPM | TEMPERATURE: 98.5 F | BODY MASS INDEX: 57.52 KG/M2 | SYSTOLIC BLOOD PRESSURE: 168 MMHG | OXYGEN SATURATION: 98 % | WEIGHT: 293 LBS

## 2025-06-12 DIAGNOSIS — R60.0 BILATERAL LEG EDEMA: Primary | ICD-10-CM

## 2025-06-12 LAB
ALBUMIN SERPL-MCNC: 3.9 G/DL (ref 3.5–5.2)
ALP SERPL-CCNC: 65 U/L (ref 35–104)
ALT SERPL-CCNC: 17 U/L (ref 10–35)
ANION GAP SERPL CALCULATED.3IONS-SCNC: 14 MMOL/L (ref 9–16)
AST SERPL-CCNC: 16 U/L (ref 10–35)
BASOPHILS # BLD: 0.07 K/UL (ref 0–0.2)
BASOPHILS NFR BLD: 1 % (ref 0–2)
BILIRUB SERPL-MCNC: <0.2 MG/DL (ref 0–1.2)
BNP SERPL-MCNC: <36 PG/ML (ref 0–300)
BUN SERPL-MCNC: 15 MG/DL (ref 6–20)
CALCIUM SERPL-MCNC: 9 MG/DL (ref 8.6–10.4)
CHLORIDE SERPL-SCNC: 104 MMOL/L (ref 98–107)
CO2 SERPL-SCNC: 22 MMOL/L (ref 20–31)
CREAT SERPL-MCNC: 0.5 MG/DL (ref 0.7–1.2)
EOSINOPHIL # BLD: 0.38 K/UL (ref 0–0.44)
EOSINOPHILS RELATIVE PERCENT: 4 % (ref 0–4)
ERYTHROCYTE [DISTWIDTH] IN BLOOD BY AUTOMATED COUNT: 15.9 % (ref 11.5–14.9)
GFR, ESTIMATED: >90 ML/MIN/1.73M2
GLUCOSE SERPL-MCNC: 155 MG/DL (ref 74–99)
HCT VFR BLD AUTO: 36.3 % (ref 36–46)
HGB BLD-MCNC: 11.6 G/DL (ref 12–16)
IMM GRANULOCYTES # BLD AUTO: 0.03 K/UL (ref 0–0.3)
IMM GRANULOCYTES NFR BLD: 0 %
INR PPP: 0.9
LYMPHOCYTES NFR BLD: 3.88 K/UL (ref 1.1–3.7)
LYMPHOCYTES RELATIVE PERCENT: 39 % (ref 24–44)
MAGNESIUM SERPL-MCNC: 1.9 MG/DL (ref 1.6–2.6)
MCH RBC QN AUTO: 30.6 PG (ref 26–34)
MCHC RBC AUTO-ENTMCNC: 32 G/DL (ref 31–37)
MCV RBC AUTO: 95.8 FL (ref 80–100)
MONOCYTES NFR BLD: 0.86 K/UL (ref 0.1–1.2)
MONOCYTES NFR BLD: 9 % (ref 3–12)
NEUTROPHILS NFR BLD: 47 % (ref 36–66)
NEUTS SEG NFR BLD: 4.73 K/UL (ref 1.5–8.1)
NRBC BLD-RTO: 0 PER 100 WBC
PLATELET # BLD AUTO: 239 K/UL (ref 150–450)
PMV BLD AUTO: 11.6 FL (ref 8–13.5)
POTASSIUM SERPL-SCNC: 4 MMOL/L (ref 3.7–5.3)
PROT SERPL-MCNC: 7.1 G/DL (ref 6.6–8.7)
PROTHROMBIN TIME: 13.4 SEC (ref 11.8–14.6)
RBC # BLD AUTO: 3.79 M/UL (ref 3.95–5.11)
SODIUM SERPL-SCNC: 140 MMOL/L (ref 136–145)
WBC OTHER # BLD: 10 K/UL (ref 3.5–11)

## 2025-06-12 PROCEDURE — 80053 COMPREHEN METABOLIC PANEL: CPT

## 2025-06-12 PROCEDURE — 99284 EMERGENCY DEPT VISIT MOD MDM: CPT

## 2025-06-12 PROCEDURE — 85610 PROTHROMBIN TIME: CPT

## 2025-06-12 PROCEDURE — 93005 ELECTROCARDIOGRAM TRACING: CPT | Performed by: STUDENT IN AN ORGANIZED HEALTH CARE EDUCATION/TRAINING PROGRAM

## 2025-06-12 PROCEDURE — 83735 ASSAY OF MAGNESIUM: CPT

## 2025-06-12 PROCEDURE — 36415 COLL VENOUS BLD VENIPUNCTURE: CPT

## 2025-06-12 PROCEDURE — 85025 COMPLETE CBC W/AUTO DIFF WBC: CPT

## 2025-06-12 PROCEDURE — 83880 ASSAY OF NATRIURETIC PEPTIDE: CPT

## 2025-06-12 ASSESSMENT — ENCOUNTER SYMPTOMS
DIARRHEA: 0
SHORTNESS OF BREATH: 0
VOMITING: 0
NAUSEA: 0
EYE DISCHARGE: 0
EYE REDNESS: 0
RHINORRHEA: 0
ABDOMINAL PAIN: 0
SORE THROAT: 0

## 2025-06-12 ASSESSMENT — LIFESTYLE VARIABLES
HOW MANY STANDARD DRINKS CONTAINING ALCOHOL DO YOU HAVE ON A TYPICAL DAY: PATIENT DOES NOT DRINK
HOW OFTEN DO YOU HAVE A DRINK CONTAINING ALCOHOL: NEVER

## 2025-06-12 ASSESSMENT — PAIN - FUNCTIONAL ASSESSMENT: PAIN_FUNCTIONAL_ASSESSMENT: NONE - DENIES PAIN

## 2025-06-12 NOTE — ED NOTES
Pt discharged in stable condition with dc instructions. Pt ambulates to door with steady gait and without assistance.

## 2025-06-12 NOTE — ED PROVIDER NOTES
EMERGENCY DEPARTMENT ENCOUNTER    Pt Name: Lennie Johnson  MRN: [id number]  Birthdate 1975  Date of evaluation: 6/12/25  CHIEF COMPLAINT       Chief Complaint   Patient presents with    Joint Swelling     HISTORY OF PRESENT ILLNESS   49-year-old history of hypertension, hyperlipidemia, diabetes, obesity presenting with ankle swelling    Bilateral ankle swelling and some paresthesias    No falls or injuries.  No chest pain or pressure.  No shortness of breath.  No fevers chills numbness weakness    Has not been taking any medication for 6 months              REVIEW OF SYSTEMS     Review of Systems   Constitutional:  Negative for chills and fever.   HENT:  Negative for rhinorrhea and sore throat.    Eyes:  Negative for discharge and redness.   Respiratory:  Negative for shortness of breath.    Cardiovascular:  Positive for leg swelling. Negative for chest pain.   Gastrointestinal:  Negative for abdominal pain, diarrhea, nausea and vomiting.   Genitourinary:  Negative for dysuria, frequency and urgency.   Musculoskeletal:  Negative for arthralgias and myalgias.   Skin:  Negative for rash.   Neurological:  Negative for weakness and numbness.   Psychiatric/Behavioral:  Negative for suicidal ideas.    All other systems reviewed and are negative.    PASTMEDICAL HISTORY     Past Medical History:   Diagnosis Date    Abnormal uterine bleeding (AUB)     Anemia     Arthritis     KNEE AND BACK    Asthma     Diabetes mellitus (HCC)     managed by PCP; not taking Metformin or Trulicity at time of PAT visit doesn't know if she even has a current prescription; does not check blood sugar at home    GERD (gastroesophageal reflux disease)     not recently    Hidradenitis suppurativa     takes Humira    History of blood transfusion 1997    Hyperlipidemia     Hypertension     managed by PCP    Leg swelling     Obesity, morbid, BMI 50 or higher (HCC)     Sleep apnea     isn't using, doesn't have a mask for it    Wears dentures      evidence of renal failure, liver failure, heart failure    Bilateral no DVT risk factors doubt DVTs    Suspect venous stasis discharge compression stockings follow-up with the primary doctor return for worsening    PROCEDURES:    EKG 12 Lead    Date/Time: 6/12/2025 1:56 AM    Performed by: Azeem Jensen MD  Authorized by: Azeem Jensen MD    ECG interpreted by ED Physician in the absence of a cardiologist: yes    Interpretation:     Interpretation: normal    Rate:     ECG rate:  70    ECG rate assessment: normal    Rhythm:     Rhythm: sinus rhythm    Ectopy:     Ectopy: none    QRS:     QRS axis:  Normal    QRS intervals:  Normal    QRS conduction: normal    ST segments:     ST segments:  Normal  T waves:     T waves: normal    Q waves:     Abnormal Q-waves: not present          DATA FOR LAB AND RADIOLOGY TESTS ORDERED BELOW ARE REVIEWED BY THE ED CLINICIAN:    RADIOLOGY: All x-rays, CT, MRI, and formal ultrasound images (except ED bedside ultrasound) are read by the radiologist, see reports below, unless otherwise noted in MDM or here.  Reports below are reviewed by myself.  No orders to display       LABS: Lab orders shown below, the results are reviewed by myself, and all abnormals are listed below.  Labs Reviewed   CBC WITH AUTO DIFFERENTIAL - Abnormal; Notable for the following components:       Result Value    RBC 3.79 (*)     Hemoglobin 11.6 (*)     RDW 15.9 (*)     Lymphocytes Absolute 3.88 (*)     All other components within normal limits   COMPREHENSIVE METABOLIC PANEL - Abnormal; Notable for the following components:    Glucose 155 (*)     Creatinine 0.5 (*)     All other components within normal limits   MAGNESIUM   PROTIME-INR   BRAIN NATRIURETIC PEPTIDE       Vitals Reviewed:    Vitals:    06/12/25 0114 06/12/25 0116   BP:  (!) 168/63   Pulse: 67    Resp: 18    Temp: 98.5 °F (36.9 °C)    TempSrc: Oral    SpO2: 98%    Weight: (!) 138.3 kg (305 lb)    Height: 1.524 m (5')      MEDICATIONS GIVEN

## 2025-06-13 LAB
EKG ATRIAL RATE: 70 BPM
EKG P AXIS: 63 DEGREES
EKG P-R INTERVAL: 158 MS
EKG Q-T INTERVAL: 402 MS
EKG QRS DURATION: 82 MS
EKG QTC CALCULATION (BAZETT): 434 MS
EKG R AXIS: 63 DEGREES
EKG T AXIS: 22 DEGREES
EKG VENTRICULAR RATE: 70 BPM

## 2025-06-13 PROCEDURE — 93010 ELECTROCARDIOGRAM REPORT: CPT | Performed by: INTERNAL MEDICINE

## 2025-06-26 ENCOUNTER — HOSPITAL ENCOUNTER (OUTPATIENT)
Dept: WOMENS IMAGING | Age: 50
Discharge: HOME | End: 2025-06-28
Payer: COMMERCIAL

## 2025-06-26 VITALS — WEIGHT: 293 LBS | HEIGHT: 60 IN | BODY MASS INDEX: 57.52 KG/M2

## 2025-06-26 DIAGNOSIS — Z01.419 WELL WOMAN EXAM WITH ROUTINE GYNECOLOGICAL EXAM: ICD-10-CM

## 2025-06-26 PROCEDURE — 77063 BREAST TOMOSYNTHESIS BI: CPT

## (undated) DEVICE — PAD,ABDOMINAL,8"X7.5",ST,LF,20/BX: Brand: MEDLINE INDUSTRIES, INC.

## (undated) DEVICE — GOWN,AURORA,NONREINFORCED,LARGE: Brand: MEDLINE

## (undated) DEVICE — BAG WASTE MYSOSURE FLUENT

## (undated) DEVICE — HYPODERMIC SAFETY NEEDLE: Brand: MAGELLAN

## (undated) DEVICE — CATHETER,URETHRAL,REDRUBBER,STRL,16FR: Brand: MEDLINE

## (undated) DEVICE — HANDPIECE ABLAT DISP FOR ENDOMET SYS

## (undated) DEVICE — PAD,SANITARY,11 IN,MAXI,W/WINGS,N-STRL: Brand: MEDLINE

## (undated) DEVICE — 4-PORT MANIFOLD: Brand: NEPTUNE 2

## (undated) DEVICE — SYRINGE MED 10ML LUERLOCK TIP W/O SFTY DISP

## (undated) DEVICE — COVER OR TBL W40XL90IN ABSRB STD AND GRIPPY BK SAHARA

## (undated) DEVICE — TUBING, SUCTION, 9/32" X 20', STRAIGHT: Brand: MEDLINE INDUSTRIES, INC.

## (undated) DEVICE — STAZ MINOR BASIN: Brand: MEDLINE INDUSTRIES, INC.

## (undated) DEVICE — SHEET, ORTHO, SPLIT, STERILE: Brand: MEDLINE

## (undated) DEVICE — INTENDED FOR TISSUE SEPARATION, AND OTHER PROCEDURES THAT REQUIRE A SHARP SURGICAL BLADE TO PUNCTURE OR CUT.: Brand: BARD-PARKER ® CARBON RIB-BACK BLADES

## (undated) DEVICE — SYRINGE,EAR/ULCER, 2 OZ, STERILE: Brand: MEDLINE

## (undated) DEVICE — MARKER,SKIN,WI/RULER AND LABELS: Brand: MEDLINE

## (undated) DEVICE — TRAP TISS FLUENT FLD MGMT SYS

## (undated) DEVICE — FLUID MGMT SYS FLUENT KIT 6/PK

## (undated) DEVICE — DEVICE TISS REM IU CANSTR VAC TB FT PEDAL DISPOSABLE MYOSURE

## (undated) DEVICE — GAUZE,SPONGE,FLUFF,6"X6.75",STRL,5/TRAY: Brand: MEDLINE

## (undated) DEVICE — Z DISCONTINUED USE 2272124 DRAPE SURG XL N INVASIVE 2 LAYR DISP

## (undated) DEVICE — GLOVE SURG SZ 6 THK91MIL LTX FREE SYN POLYISOPRENE ANTI

## (undated) DEVICE — GOWN,SIRUS,NON REINFRCD,LARGE,SET IN SL: Brand: MEDLINE

## (undated) DEVICE — SYRINGE,CONTROL,LL,FINGER,GRIP: Brand: MEDLINE INDUSTRIES, INC.

## (undated) DEVICE — SET FLD MGMT OUTFLO TB DISP FOR CTRL SYS AQUILEX

## (undated) DEVICE — CORD,CAUTERY,BIPOLAR,STERILE: Brand: MEDLINE

## (undated) DEVICE — GLOVE SURG SZ 65 STD WHT LTX SYN POLYMER BEAD REINF ANTI RL

## (undated) DEVICE — ELECTRODE PT RET AD L9FT HI MOIST COND ADH HYDRGEL CORDED

## (undated) DEVICE — CONTROL SYRINGE LUER-LOCK TIP: Brand: MONOJECT

## (undated) DEVICE — UNDERPANTS MAT L XL SEAMLESS CLR CODE WAISTBAND KNIT

## (undated) DEVICE — DEVICE TISS REM DIA3MM L25.25IN ENDOSCP F/ IU POLYPS

## (undated) DEVICE — PENCIL ES L3M BTTN SWCH HOLSTER W/ BLDE ELECTRD EDGE

## (undated) DEVICE — ELECTROSURGICAL PENCIL BUTTON SWITCH E-Z CLEAN COATED BLADE ELECTRODE 10 FT (3 M) CORD HOLSTER: Brand: MEGADYNE

## (undated) DEVICE — GLOVE ORANGE PI 7   MSG9070

## (undated) DEVICE — NEEDLE SPINAL 22GA L3.5IN SPINOCAN

## (undated) DEVICE — CONTAINER,SPECIMEN,4OZ,OR STRL: Brand: MEDLINE

## (undated) DEVICE — GLOVE SURG SZ 65 THK91MIL LTX FREE SYN POLYISOPRENE

## (undated) DEVICE — CROUCH CORNEAL PROTECTOR: Brand: BAUSCH + LOMB

## (undated) DEVICE — BLADE ES ELASTOMERIC COAT INSUL DURABLE BEND UPTO 90DEG

## (undated) DEVICE — MASTISOL ADHESIVE LIQ 2/3ML

## (undated) DEVICE — GOWN,AURORA,NONRNF,XL,30/CS: Brand: MEDLINE

## (undated) DEVICE — SET ENDOSCP SEAL HYSTEROSCOPE RIG OUTFLO CHN DISP MYOSURE

## (undated) DEVICE — SWAB CULT CLR BLU PLAS RAYON LIQ AMIES AERB ANAERB FRIC CAP

## (undated) DEVICE — YANKAUER,POOLE TIP,STERILE,50/CS: Brand: MEDLINE

## (undated) DEVICE — DRAPE,UTILTY,TAPE,15X26, 4EA/PK: Brand: MEDLINE

## (undated) DEVICE — CYSTO/BLADDER IRRIGATION SET, REGULATING CLAMP

## (undated) DEVICE — SUTURE PROL SZ 4-0 L18IN NONABSORBABLE BLU L13MM P-3 3/8 8699G

## (undated) DEVICE — SOLUTION PREP POVIDONE IOD FOR SKIN MUCOUS MEM PRIOR TO

## (undated) DEVICE — 1016 S-DRAPE IRRIG POUCH 10/BOX: Brand: STERI-DRAPE™

## (undated) DEVICE — STERILE POLYISOPRENE POWDER-FREE SURGICAL GLOVES WITH EMOLLIENT COATING: Brand: PROTEXIS

## (undated) DEVICE — SUTURE CHROMIC GUT SZ 4-0 L27IN ABSRB BRN L17MM RB-1 1/2 U203H

## (undated) DEVICE — SVMMC GYN MIN PK

## (undated) DEVICE — SHEET DRAPE FULL 70X100

## (undated) DEVICE — SYRINGE MED 10ML TRNSLUC BRL PLUNG BLK MRK POLYPR CTRL

## (undated) DEVICE — STRIP SKIN CLSR W0.25XL4IN WHT SPUNBOUND FBR NYL HI ADH

## (undated) DEVICE — SYRINGE IRRIG 60ML SFT PLIABLE BLB EZ TO GRP 1 HND USE W/

## (undated) DEVICE — GLOVE ORANGE PI 7 1/2   MSG9075

## (undated) DEVICE — GLOVE SURG SZ 75 CRM LTX FREE POLYISOPRENE POLYMER BEAD ANTI

## (undated) DEVICE — UNDERPANTS MAT L/XL KNIT SEAMLESS CLR CODE WAISTBAND

## (undated) DEVICE — SUTURE MONOCRYL SZ 4-0 L27IN ABSRB UD L19MM PS-2 1/2 CIR PRIM Y426H

## (undated) DEVICE — SUTURE VICRYL RAPIDE 5-0 L18IN ABSRB UD L13MM P-3 3/8 CIR VR493

## (undated) DEVICE — BETADINE 5% EYE SOL

## (undated) DEVICE — SINGLE PORT MANIFOLD: Brand: NEPTUNE 2

## (undated) DEVICE — Device

## (undated) DEVICE — ST CHARLES PERI-GYN PACK: Brand: MEDLINE INDUSTRIES, INC.

## (undated) DEVICE — YANKAUER,BULB TIP,W/O VENT,RIGID,STERILE: Brand: MEDLINE

## (undated) DEVICE — SOLUTION SCRB 4OZ 10% POVIDONE IOD ANTIMIC BTL